# Patient Record
Sex: FEMALE | Race: WHITE | Employment: FULL TIME | URBAN - METROPOLITAN AREA
[De-identification: names, ages, dates, MRNs, and addresses within clinical notes are randomized per-mention and may not be internally consistent; named-entity substitution may affect disease eponyms.]

---

## 2017-11-07 ENCOUNTER — OFFICE VISIT (OUTPATIENT)
Dept: FAMILY MEDICINE CLINIC | Age: 57
End: 2017-11-07

## 2017-11-07 VITALS
DIASTOLIC BLOOD PRESSURE: 76 MMHG | WEIGHT: 207.2 LBS | HEART RATE: 59 BPM | BODY MASS INDEX: 30.69 KG/M2 | OXYGEN SATURATION: 95 % | TEMPERATURE: 98.2 F | SYSTOLIC BLOOD PRESSURE: 118 MMHG | HEIGHT: 69 IN | RESPIRATION RATE: 18 BRPM

## 2017-11-07 DIAGNOSIS — F32.A CHRONIC DEPRESSION: ICD-10-CM

## 2017-11-07 DIAGNOSIS — M19.049 HAND ARTHRITIS: ICD-10-CM

## 2017-11-07 DIAGNOSIS — E78.5 HYPERLIPIDEMIA, UNSPECIFIED HYPERLIPIDEMIA TYPE: ICD-10-CM

## 2017-11-07 DIAGNOSIS — Z00.00 ROUTINE GENERAL MEDICAL EXAMINATION AT A HEALTH CARE FACILITY: Primary | ICD-10-CM

## 2017-11-07 DIAGNOSIS — E55.9 VITAMIN D DEFICIENCY: ICD-10-CM

## 2017-11-07 DIAGNOSIS — Z23 ENCOUNTER FOR IMMUNIZATION: ICD-10-CM

## 2017-11-07 DIAGNOSIS — H93.13 BILATERAL TINNITUS: ICD-10-CM

## 2017-11-07 RX ORDER — FLUOXETINE HYDROCHLORIDE 20 MG/1
20 CAPSULE ORAL DAILY
Qty: 90 CAP | Refills: 3 | Status: SHIPPED | OUTPATIENT
Start: 2017-11-07 | End: 2019-03-07 | Stop reason: SDUPTHER

## 2017-11-07 RX ORDER — BUPROPION HYDROCHLORIDE 300 MG/1
300 TABLET ORAL DAILY
Qty: 90 TAB | Refills: 3 | Status: SHIPPED | OUTPATIENT
Start: 2017-11-07 | End: 2019-03-06 | Stop reason: SDUPTHER

## 2017-11-07 NOTE — PROGRESS NOTES
Chief Complaint   Patient presents with    Complete Physical     fasting      1. Have you been to the ER, urgent care clinic since your last visit? Hospitalized since your last visit? Yes [address] in [facility] for UTI    2. Have you seen or consulted any other health care providers outside of the [address] [place] [address] since your last visit? Include any pap smears or colon screening.    Yes see above

## 2017-11-07 NOTE — PATIENT INSTRUCTIONS
Well Visit, Women 48 to 72: Care Instructions  Your Care Instructions    Physical exams can help you stay healthy. Your doctor has checked your overall health and may have suggested ways to take good care of yourself. He or she also may have recommended tests. At home, you can help prevent illness with healthy eating, regular exercise, and other steps. Follow-up care is a key part of your treatment and safety. Be sure to make and go to all appointments, and call your doctor if you are having problems. It's also a good idea to know your test results and keep a list of the medicines you take. How can you care for yourself at home? · Reach and stay at a healthy weight. This will lower your risk for many problems, such as obesity, diabetes, heart disease, and high blood pressure. · Get at least 30 minutes of exercise on most days of the week. Walking is a good choice. You also may want to do other activities, such as running, swimming, cycling, or playing tennis or team sports. · Do not smoke. Smoking can make health problems worse. If you need help quitting, talk to your doctor about stop-smoking programs and medicines. These can increase your chances of quitting for good. · Protect your skin from too much sun. When you're outdoors from 10 a.m. to 4 p.m., stay in the shade or cover up with clothing and a hat with a wide brim. Wear sunglasses that block UV rays. Even when it's cloudy, put broad-spectrum sunscreen (SPF 30 or higher) on any exposed skin. · See a dentist one or two times a year for checkups and to have your teeth cleaned. · Wear a seat belt in the car. · Limit alcohol to 1 drink a day. Too much alcohol can cause health problems. Follow your doctor's advice about when to have certain tests. These tests can spot problems early. · Cholesterol.  Your doctor will tell you how often to have this done based on your age, family history, or other things that can increase your risk for heart attack and stroke. · Blood pressure. Have your blood pressure checked during a routine doctor visit. Your doctor will tell you how often to check your blood pressure based on your age, your blood pressure results, and other factors. · Mammogram. Ask your doctor how often you should have a mammogram, which is an X-ray of your breasts. A mammogram can spot breast cancer before it can be felt and when it is easiest to treat. · Pap test and pelvic exam. Ask your doctor how often you should have a Pap test. You may not need to have a Pap test as often as you used to. · Vision. Have your eyes checked every year or two or as often as your doctor suggests. Some experts recommend that you have yearly exams for glaucoma and other age-related eye problems starting at age 48. · Hearing. Tell your doctor if you notice any change in your hearing. You can have tests to find out how well you hear. · Diabetes. Ask your doctor whether you should have tests for diabetes. · Colon cancer. You should begin tests for colon cancer at age 48. You may have one of several tests. Your doctor will tell you how often to have tests based on your age and risk. Risks include whether you already had a precancerous polyp removed from your colon or whether your parents, sisters and brothers, or children have had colon cancer. · Thyroid disease. Talk to your doctor about whether to have your thyroid checked as part of a regular physical exam. Women have an increased chance of a thyroid problem. · Osteoporosis. You should begin tests for bone density at age 72. If you are younger than 72, ask your doctor whether you have factors that may increase your risk for this disease. You may want to have this test before age 72. · Heart attack and stroke risk. At least every 4 to 6 years, you should have your risk for heart attack and stroke assessed.  Your doctor uses factors such as your age, blood pressure, cholesterol, and whether you smoke or have diabetes to show what your risk for a heart attack or stroke is over the next 10 years. When should you call for help? Watch closely for changes in your health, and be sure to contact your doctor if you have any problems or symptoms that concern you. Where can you learn more? Go to http://radha-jihan.info/. Enter Y588 in the search box to learn more about \"Well Visit, Women 50 to 72: Care Instructions. \"  Current as of: May 12, 2017  Content Version: 11.4  © 3288-7366 [employer]. Care instructions adapted under license by [employer] (which disclaims liability or warranty for this information). If you have questions about a medical condition or this instruction, always ask your healthcare professional. [address] any warranty or liability for your use of this information.

## 2017-11-07 NOTE — PROGRESS NOTES
HISTORY OF PRESENT ILLNESS   HPI  Annual CPE fasting. Since her last checkup over a year ago she underwent partial knee replacement in 8-2016. She states that ever since then and starting to eat healthier, she in general has been feeling so much better. She has been having some generalized aching all over which she a/t the Lipitor so she stopped it after ~ 2-3 month trial and has been feeling much better ever since. No more joint pains or generalized aching. She has been exercising more routinely and following healthier diet, see Soc hx below. She is happy to see her wt coming down as a result of her healthier lifestyle modifications. Her mood and energy are much better as well. REVIEW OF SYMPTOMS     Review of Systems   Constitutional: Negative. HENT: Positive for tinnitus (both ears x several years, seems to be getting worse, low pitch constant buzzing noise). Negative for hearing loss. Eyes: Negative. Respiratory: Negative. Cardiovascular: Negative. Gastrointestinal: Negative. Genitourinary: Negative. Musculoskeletal:        B hand joints stiff at times, PGM had hand arthritis that became crippling and pt is concerned. Her left pinky has been stiff and achy. The left thumb tendonitis is getting better but not gone away. She is no longer taking Mobic since she got her knee replaced bc that is doing so much better. Neurological: Negative. Negative for dizziness and headaches. Psychiatric/Behavioral: Negative for depression. The patient is not nervous/anxious and does not have insomnia.          Doing really well on current regimen of Wellbutrin and Prozac and needs these renewed.            PROBLEM LIST/MEDICAL HISTORY      Problem List  Date Reviewed: 11/7/2017          Codes Class Noted    DDD (degenerative disc disease), cervical ICD-10-CM: M50.30  ICD-9-CM: 722.4  7/14/2016        Cervical spinal stenosis w/ right radiculopathy ICD-10-CM: M48.02  ICD-9-CM: 723.0  7/14/2016 Overview Signed 7/14/2016  8:43 AM by Mary Butler MD     2016 Dr Jim Joyner, PT             Screening exams for skin cancer ICD-10-CM: Z12.83  ICD-9-CM: V76.43  3/30/2016    Overview Signed 3/30/2016 11:22 AM by Mary Butler MD     Dermatologist routine checkes             Endometrial cancer [facility]) s/p Total Lap Hysterectomy-BSO 9-2015 ICD-10-CM: C54.1  ICD-9-CM: 182.0  9/10/2015    Overview Signed 10/13/2015  1:13 PM by Mary Butler MD     Grade 1 Endometrioid Carcinoma, Gyn Dr Grady Lopez, Fabiola Cooper, Dr Tiara Mendoza              Avulsion fracture of right ankle ICD-10-CM: S82.899A  ICD-9-CM: 824.8  7/10/2015    Overview Signed 7/10/2015  9:02 AM by Mary Butler MD     ~6-2015, Keyla Yuan, boot             Baker's cyst ICD-10-CM: M71.20  ICD-9-CM: 727.51  12/9/2013    Overview Signed 12/9/2013 11:58 AM by Mary Butler MD     Left knee; 12/6/13, Dr Flora Knutson             Vitamin D deficiency ICD-10-CM: E55.9  ICD-9-CM: 268.9  12/23/2012        Postmenopause, LMP 44 yo, No HRT ICD-10-CM: Z78.0  ICD-9-CM: V49.81  12/6/2012        Seasonal allergic rhinitis ICD-10-CM: J30.2  ICD-9-CM: 477.9  12/6/2012    Overview Signed 12/6/2012  2:27 PM by Mary Butler MD     Spring and Fall worst             Pneumonia ICD-10-CM: J18.9  ICD-9-CM: 389  12/6/2012    Overview Signed 12/6/2012  2:28 PM by Mary Butler MD     Spring 2012, treated outpt, [facility]             History of depression ICD-10-CM: Z86.59  ICD-9-CM: V11.8  12/6/2012    Overview Addendum 6/26/2013  2:16 PM by Mary Butler MD     Treated sporadically over the years w/ Effexor or Celexa (better)             Hyperlipidemia ICD-10-CM: E78.5  ICD-9-CM: 272.4  12/6/2012        Chronic depression ICD-10-CM: F32.9  ICD-9-CM: 479  12/6/2012        H/O Recurrent Asthmatic Bronchitis ICD-10-CM: J45.909  ICD-9-CM: 493.90  12/6/2012    Overview Signed 12/6/2012  2:38 PM by Mary Butler MD Since ~ 2006             OA (osteoarthritis) of left knee ICD-10-CM: M17.10  ICD-9-CM: 715.36  12/6/2012    Overview Addendum 3/25/2015 12:00 PM by Lavelle Espino MD     Xrays 2011; Ortho Dr Valeria Zelaya, 12/2013, xrayed again, advanced DJD/OA ; CSI 12/2013, 3/2014, 6/2014, 12/2014                       PAST SURGICAL HISTORY       Past Surgical History:   Procedure Laterality Date    HX BREAST BIOPSY  12/17/12    right breast, benign, calcifications; [facility] Babinski; Fibrosis    HX COLONOSCOPY  4-, Dr Matias Azul    Normal, follow up 10 yrs    HX KNEE REPLACEMENT Left 08/2016    Dr Pam Don    [address]  2014    [facility] removed from right upper back    HX TOTAL LAPAROSCOPIC HYSTERECTOMY W/ BS&O  9/24/2015, Dr Alyssa Spence    Total Lap Hysterectomy and BSO; Endometrial cancer; grade 1 endometrioid adenocarcinoma.; Dr. Arlin Mccall, 23 yo         MEDICATIONS      Current Outpatient Prescriptions   Medication Sig    FLUoxetine (PROZAC) 20 mg capsule Take 20 mg by mouth daily. Started per Dr. Ramonita Marroquin 8-24-15    buPROPion XL (WELLBUTRIN XL) 300 mg XL tablet Take 300 mg by mouth every morning.  cholecalciferol (VITAMIN D3) 1,000 unit tablet Take  by mouth daily.  cetirizine (ZYRTEC) 10 mg tablet Take 10 mg by mouth daily as needed for Allergies (spring and fall).  multivitamin (ONE A DAY) tablet Take 1 Tab by mouth daily.  fluticasone (FLONASE) 50 mcg/actuation nasal spray USE 2 SPRAYS IN EACH NOSTRIL AT BEDTIME (Patient taking differently: USE 2 SPRAYS IN EACH NOSTRIL AT BEDTIME AS NEEDED)    calcium 600 mg cap Take  by mouth daily.  albuterol (PROAIR HFA) 90 mcg/actuation inhaler Take 2 Puffs by inhalation every six (6) hours as needed. No current facility-administered medications for this visit.            ALLERGIES     Allergies   Allergen Reactions    Sulfa (Sulfonamide Antibiotics) Other (comments)     Causes migraines    Bactrim [Sulfamethoprim Ds] Other (comments)     headaches    Lipitor [Atorvastatin] Myalgia     Aching all over    Tramadol Itching          SOCIAL HISTORY       Social History     Social History    Marital status:      Spouse name: N/A    Number of children: 3    Years of education: N/A     Occupational History     at [employer] desk      Social History Main Topics    Smoking status: Former Smoker     Quit date: 1985    Smokeless tobacco: Never Used      Comment: used to be a light smoker x 3 yrs    Alcohol use 0.0 oz/week     0 Standard drinks or equivalent per week      Comment: rare, maybe 1-2 glasses of wine a month at most    Drug use: No    Sexual activity: Yes     Partners: Male      Comment: Postmenopause     Other Topics Concern    Caffeine Concern No     1 cup of coffee  qd    Weight Concern Yes     working on losing wt; happy her wt is donw from 222 to 207 since     Special Diet Yes     just cut back on fast foods, cut back on portion sizes, making healthier food choices, less carbs; not as strict the past few weeks but will get back on track    Exercise Yes     since 16 walks/jogs 3 x a week x 45 minutes, did 5k 10-21-17; stretching a few x a week, walks dog qd 5- 20 minutes; doing and feeling so much better anyway since her knee replacement in      Social History Narrative        IMMUNIZATIONS  Immunization History   Administered Date(s) Administered    Influenza Vaccine 2012, 2013    Influenza Vaccine (Quad) PF 2017    TDAP Vaccine 2006    Td 2016         FAMILY HISTORY     Family History   Problem Relation Age of Onset    Kidney Disease Mother      kidney transplant age 75yo ;  renal failure age 80    Diabetes Mother 36    High Cholesterol Mother     Arthritis-osteo Mother      B knee replacements in her 63's    Heart Disease Father     Diabetes Father     Dementia Father      Alzheimer's;  after fall and hip fx    Heart Surgery Father      CABG in his 63's    Allergic Rhinitis Daughter     Hypertension Brother     Hypertension Brother     Diabetes Brother      mild, controlling w/ diet    Diabetes Maternal Grandfather     Heart Attack Maternal Grandfather 62      MI          VITALS     Visit Vitals    /76 (BP 1 Location: Left arm, BP Patient Position: Sitting)    Pulse (!) 59    Temp 98.2 °F (36.8 °C) (Oral)    Resp 18    Ht 5' 9\" (1.753 m)    Wt 207 lb 3.2 oz (94 kg)    SpO2 95%    BMI 30.6 kg/m2          PHYSICAL EXAMINATION     Physical Exam   Constitutional: She is oriented to person, place, and time. No distress. HENT:   Right Ear: Tympanic membrane normal.   Left Ear: Tympanic membrane normal.   Nose: Nose normal.   Mouth/Throat: Oropharynx is clear and moist. No oropharyngeal exudate. Eyes: EOM are normal. Pupils are equal, round, and reactive to light. Neck: Neck supple. No JVD present. Carotid bruit is not present. No thyromegaly present. Cardiovascular: Normal rate, regular rhythm and normal heart sounds. No murmur heard. Pulmonary/Chest: Effort normal and breath sounds normal.   Abdominal: Soft. Bowel sounds are normal. She exhibits no distension and no mass. There is no tenderness. Musculoskeletal: She exhibits no edema or tenderness. Nodules at DIP joints left pinky and right middle finger. Mild arthritic changes. Lymphadenopathy:     She has no cervical adenopathy. Neurological: She is alert and oriented to person, place, and time. Coordination normal.   Psychiatric: Mood, affect and judgment normal.   Vitals reviewed.            ASSESSMENT & PLAN       ICD-10-CM ICD-9-CM    1. Routine general medical examination at a health care facility Z00.00 V70.0 CBC WITH AUTOMATED DIFF      LIPID PANEL      METABOLIC PANEL, COMPREHENSIVE      TSH 3RD GENERATION      VITAMIN D, 25 HYDROXY   2.  Hyperlipidemia, unspecified hyperlipidemia type E78.5 272.4 LIPID PANEL   3. Vitamin D deficiency E55.9 268.9 VITAMIN D, 25 HYDROXY   4. Chronic depression, well controlled on current medication regimen F32.9 311 FLUoxetine (PROZAC) 20 mg capsule      buPROPion XL (WELLBUTRIN XL) 300 mg XL tablet   5. Bilateral tinnitus H93.13 388.30 REFERRAL TO ENT-OTOLARYNGOLOGY   6. Hand arthritis M19.049 716.94 REFERRAL TO ORTHOPEDICS   7. Encounter for immunization Z23 V03.89 INFLUENZA VIRUS VAC QUAD,SPLIT,PRESV FREE SYRINGE IM      [place] IMMUNIZ ADMIN,1 SINGLE/COMB VAC/TOXOID     Fasting labs today  Reviewed diet, nutrition, exercise and weight control  Commended on her modifications thus far  Cardiovascular risk and specific lipid/LDL goals reviewed  Reviewed medications and side effects in detail  She stopped Lipitor several months ago due to aching and her symptoms went away. She is feeling much better in general now. She has been working on losing wt w/ better diet and regular exercise and feels great; happy her wt is down from 222 to 207 since 2016 so far. Further follow up & other recommendations pending review of labs as well  If all remains good and stable, RTC 1 yr CPE  Sees gyn q 6months due to h/o hysterectomy for endometrial cancer  Mammogram is scheduled for  at [facility]. Colonoscopy up to date.

## 2017-11-08 LAB
25(OH)D3+25(OH)D2 SERPL-MCNC: 31.3 NG/ML (ref 30–100)
ALBUMIN SERPL-MCNC: 4.5 G/DL (ref 3.5–5.5)
ALBUMIN/GLOB SERPL: 1.6 {RATIO} (ref 1.2–2.2)
ALP SERPL-CCNC: 90 IU/L (ref 39–117)
ALT SERPL-CCNC: 26 IU/L (ref 0–32)
AST SERPL-CCNC: 20 IU/L (ref 0–40)
BASOPHILS # BLD AUTO: 0 X10E3/UL (ref 0–0.2)
BASOPHILS NFR BLD AUTO: 1 %
BILIRUB SERPL-MCNC: 0.4 MG/DL (ref 0–1.2)
BUN SERPL-MCNC: 17 MG/DL (ref 6–24)
BUN/CREAT SERPL: 21 (ref 9–23)
CALCIUM SERPL-MCNC: 9.6 MG/DL (ref 8.7–10.2)
CHLORIDE SERPL-SCNC: 101 MMOL/L (ref 96–106)
CHOLEST SERPL-MCNC: 284 MG/DL (ref 100–199)
CO2 SERPL-SCNC: 25 MMOL/L (ref 18–29)
CREAT SERPL-MCNC: 0.82 MG/DL (ref 0.57–1)
EOSINOPHIL # BLD AUTO: 0.1 X10E3/UL (ref 0–0.4)
EOSINOPHIL NFR BLD AUTO: 2 %
ERYTHROCYTE [DISTWIDTH] IN BLOOD BY AUTOMATED COUNT: 13.5 % (ref 12.3–15.4)
GFR SERPLBLD CREATININE-BSD FMLA CKD-EPI: 79 ML/MIN/1.73
GFR SERPLBLD CREATININE-BSD FMLA CKD-EPI: 91 ML/MIN/1.73
GLOBULIN SER CALC-MCNC: 2.8 G/DL (ref 1.5–4.5)
GLUCOSE SERPL-MCNC: 87 MG/DL (ref 65–99)
HCT VFR BLD AUTO: 40.3 % (ref 34–46.6)
HDLC SERPL-MCNC: 49 MG/DL
HGB BLD-MCNC: 13.3 G/DL (ref 11.1–15.9)
IMM GRANULOCYTES # BLD: 0 X10E3/UL (ref 0–0.1)
IMM GRANULOCYTES NFR BLD: 0 %
INTERPRETATION, 910389: NORMAL
LDLC SERPL CALC-MCNC: 212 MG/DL (ref 0–99)
LYMPHOCYTES # BLD AUTO: 2.1 X10E3/UL (ref 0.7–3.1)
LYMPHOCYTES NFR BLD AUTO: 47 %
MCH RBC QN AUTO: 28.9 PG (ref 26.6–33)
MCHC RBC AUTO-ENTMCNC: 33 G/DL (ref 31.5–35.7)
MCV RBC AUTO: 88 FL (ref 79–97)
MONOCYTES # BLD AUTO: 0.3 X10E3/UL (ref 0.1–0.9)
MONOCYTES NFR BLD AUTO: 7 %
NEUTROPHILS # BLD AUTO: 2 X10E3/UL (ref 1.4–7)
NEUTROPHILS NFR BLD AUTO: 43 %
PLATELET # BLD AUTO: 366 X10E3/UL (ref 150–379)
POTASSIUM SERPL-SCNC: 4.7 MMOL/L (ref 3.5–5.2)
PROT SERPL-MCNC: 7.3 G/DL (ref 6–8.5)
RBC # BLD AUTO: 4.6 X10E6/UL (ref 3.77–5.28)
SODIUM SERPL-SCNC: 143 MMOL/L (ref 134–144)
TRIGL SERPL-MCNC: 114 MG/DL (ref 0–149)
TSH SERPL DL<=0.005 MIU/L-ACNC: 2.1 UIU/ML (ref 0.45–4.5)
VLDLC SERPL CALC-MCNC: 23 MG/DL (ref 5–40)
WBC # BLD AUTO: 4.6 X10E3/UL (ref 3.4–10.8)

## 2017-11-26 ENCOUNTER — TELEPHONE (OUTPATIENT)
Dept: FAMILY MEDICINE CLINIC | Age: 57
End: 2017-11-26

## 2017-11-27 NOTE — TELEPHONE ENCOUNTER
Blood counts, liver, kidney, thyroid, bs, vit D normal  LDL cholesterol very high and highest it has been last several checks. She self dc'd Lipitor months ago due to aching. She is better since being off it, but her LDL has worsened significantly.    We should definitely try her on alternate statin therapy  I recommend Crestor 10 mg daily and that she add OTC CoQ10 100-200 mg daily  In addition to working on diet/exercise  Let me know and route back  Fasting follow up 3months

## 2017-11-29 NOTE — TELEPHONE ENCOUNTER
JANICE for return call. PI of results, she is willing to try the crestor. She will call me if any problem. She was transferred to set up appt for fasting f/u 3 months.  Confirmed pharmacy and 90 day supply

## 2017-11-30 ENCOUNTER — OFFICE VISIT (OUTPATIENT)
Dept: FAMILY MEDICINE CLINIC | Age: 57
End: 2017-11-30

## 2017-11-30 VITALS
TEMPERATURE: 97.8 F | HEIGHT: 69 IN | RESPIRATION RATE: 18 BRPM | OXYGEN SATURATION: 98 % | BODY MASS INDEX: 30.81 KG/M2 | HEART RATE: 77 BPM | WEIGHT: 208 LBS | SYSTOLIC BLOOD PRESSURE: 121 MMHG | DIASTOLIC BLOOD PRESSURE: 86 MMHG

## 2017-11-30 DIAGNOSIS — R31.0 GROSS HEMATURIA: ICD-10-CM

## 2017-11-30 DIAGNOSIS — N30.90 BLADDER INFECTION: Primary | ICD-10-CM

## 2017-11-30 LAB
BACTERIA UA POCT, BACTPOCT: NORMAL
BILIRUB UR QL STRIP: NEGATIVE
CASTS UA POCT: NORMAL
CLUE CELLS, CLUEPOCT: NORMAL
CRYSTALS UA POCT, CRYSPOCT: NORMAL
EPITHELIAL CELLS POCT: NORMAL
GLUCOSE UR-MCNC: NEGATIVE MG/DL
KETONES P FAST UR STRIP-MCNC: NEGATIVE MG/DL
MUCUS UA POCT, MUCPOCT: NORMAL
PH UR STRIP: 7 [PH] (ref 4.6–8)
PROT UR QL STRIP: NORMAL
RBC UA POCT, RBCPOCT: NORMAL
SP GR UR STRIP: 1.01 (ref 1–1.03)
TRICH UA POCT, TRICHPOC: NORMAL
UA UROBILINOGEN AMB POC: NORMAL (ref 0.2–1)
URINALYSIS CLARITY POC: CLEAR
URINALYSIS COLOR POC: YELLOW
URINE BLOOD POC: NORMAL
URINE CULT COMMENT, POCT: NORMAL
URINE LEUKOCYTES POC: NORMAL
URINE NITRITES POC: NEGATIVE
WBC UA POCT, WBCPOCT: NORMAL
YEAST UA POCT, YEASTPOC: NORMAL

## 2017-11-30 RX ORDER — CIPROFLOXACIN 500 MG/1
500 TABLET ORAL 2 TIMES DAILY
Qty: 14 TAB | Refills: 0 | Status: SHIPPED | OUTPATIENT
Start: 2017-11-30 | End: 2017-12-07

## 2017-11-30 NOTE — MR AVS SNAPSHOT
Visit Information Date & Time Provider Department Dept. Phone Encounter #  
 11/30/2017 10:45 AM Andrea Callahan, [address] [phone] [id number] Follow-up Instructions Return if symptoms worsen or fail to improve and in 1-2 mo just to recheck urine. Upcoming Health Maintenance Date Due  
 BREAST CANCER SCRN MAMMOGRAM 8/16/2018 PAP AKA CERVICAL CYTOLOGY 8/27/2018 Pneumococcal 19-64 Highest Risk (2 of 3 - PCV13) 11/7/2018 COLONOSCOPY 4/14/2024 DTaP/Tdap/Td series (3 - Td) 3/3/2026 Allergies as of 11/30/2017  Review Complete On: 11/30/2017 By: Andrea Callahan MD  
  
 Severity Noted Reaction Type Reactions Sulfa (Sulfonamide Antibiotics) Medium 11/07/2017   Side Effect Other (comments) Causes migraines Bactrim [Sulfamethoprim Ds]  12/09/2010    Other (comments)  
 headaches Lipitor [Atorvastatin]  11/07/2017    Myalgia Aching all over Tramadol  07/02/2014    Itching Current Immunizations  Reviewed on 11/7/2017 Name Date Influenza Vaccine 12/9/2013 12:24 PM, 12/6/2012 Influenza Vaccine (Quad) PF 11/7/2017 TDAP Vaccine 8/23/2006 Td 3/3/2016 Not reviewed this visit You Were Diagnosed With   
  
 Codes Comments Bladder infection    -  Primary ICD-10-CM: N30.90 ICD-9-CM: 595.9 Gross hematuria     ICD-10-CM: R31.0 ICD-9-CM: 599.71 Vitals BP Pulse Temp Resp Height(growth percentile) Weight(growth percentile) 121/86 (BP 1 Location: Left arm, BP Patient Position: Sitting) 77 97.8 °F (36.6 °C) (Oral) 18 5' 9\" (1.753 m) 208 lb (94.3 kg) SpO2 BMI OB Status Smoking Status 98% 30.72 kg/m2 Hysterectomy Former Smoker Vitals History BMI and BSA Data Body Mass Index Body Surface Area 30.72 kg/m 2 2.14 m 2 Preferred Pharmacy Pharmacy Name Phone [id number] Reta Thornton, [address] [facility] [facility] [phone] Your Updated Medication List  
  
   
This list is accurate as of: 11/30/17 11:46 AM.  Always use your most recent med list.  
  
  
  
  
 buPROPion  mg XL tablet Commonly known as:  Lesly Mays Take 1 Tab by mouth daily. In the AM  Indications: Depression  
  
 calcium 600 mg Cap Take  by mouth daily. ciprofloxacin HCl 500 mg tablet Commonly known as:  CIPRO Take 1 Tab by mouth two (2) times a day for 7 days. FLUoxetine 20 mg capsule Commonly known as:  PROzac Take 1 Cap by mouth daily. Indications: Depression  
  
 fluticasone 50 mcg/actuation nasal spray Commonly known as:  FLONASE  
USE 2 SPRAYS IN EACH NOSTRIL AT BEDTIME  
  
 multivitamin tablet Commonly known as:  ONE A DAY Take 1 Tab by mouth daily. PROAIR HFA 90 mcg/actuation inhaler Generic drug:  albuterol Take 2 Puffs by inhalation every six (6) hours as needed. VITAMIN D3 1,000 unit tablet Generic drug:  cholecalciferol Take  by mouth daily. ZyrTEC 10 mg tablet Generic drug:  cetirizine Take 10 mg by mouth daily as needed for Allergies (spring and fall). Prescriptions Sent to Pharmacy Refills  
 ciprofloxacin HCl (CIPRO) 500 mg tablet 0 Sig: Take 1 Tab by mouth two (2) times a day for 7 days. Class: Normal  
 Pharmacy: [facility] [address] [place] [address] Ph #: [phone] Route: Oral  
  
We Performed the Following AMB POC URINALYSIS DIP STICK AUTO W/ MICRO  [99862 CPT(R)] CULTURE, URINE [id number] CPT(R)] Follow-up Instructions Return if symptoms worsen or fail to improve and in 1-2 mo just to recheck urine. Patient Instructions Urinary Tract Infection in Women: Care Instructions Your Care Instructions A urinary tract infection, or UTI, is a general term for an infection anywhere between the kidneys and the urethra (where urine comes out).  Most UTIs are bladder infections. They often cause pain or burning when you urinate. UTIs are caused by bacteria and can be cured with antibiotics. Be sure to complete your treatment so that the infection goes away. Follow-up care is a key part of your treatment and safety. Be sure to make and go to all appointments, and call your doctor if you are having problems. It's also a good idea to know your test results and keep a list of the medicines you take. How can you care for yourself at home? · Take your antibiotics as directed. Do not stop taking them just because you feel better. You need to take the full course of antibiotics. · Drink extra water and other fluids for the next day or two. This may help wash out the bacteria that are causing the infection. (If you have kidney, heart, or liver disease and have to limit fluids, talk with your doctor before you increase your fluid intake.) · Avoid drinks that are carbonated or have caffeine. They can irritate the bladder. · Urinate often. Try to empty your bladder each time. · To relieve pain, take a hot bath or lay a heating pad set on low over your lower belly or genital area. Never go to sleep with a heating pad in place. To prevent UTIs · Drink plenty of water each day. This helps you urinate often, which clears bacteria from your system. (If you have kidney, heart, or liver disease and have to limit fluids, talk with your doctor before you increase your fluid intake.) · Urinate when you need to. · Urinate right after you have sex. · Change sanitary pads often. · Avoid douches, bubble baths, feminine hygiene sprays, and other feminine hygiene products that have deodorants. · After going to the bathroom, wipe from front to back. When should you call for help? Call your doctor now or seek immediate medical care if: 
? · Symptoms such as fever, chills, nausea, or vomiting get worse or appear for the first time. ? · You have new pain in your back just below your rib cage. This is called flank pain. ? · There is new blood or pus in your urine. ? · You have any problems with your antibiotic medicine. ? Watch closely for changes in your health, and be sure to contact your doctor if: 
? · You are not getting better after taking an antibiotic for 2 days. ? · Your symptoms go away but then come back. Where can you learn more? Go to http://radha-jihan.info/. Enter U991 in the search box to learn more about \"Urinary Tract Infection in Women: Care Instructions. \" Current as of: May 12, 2017 Content Version: 11.4 © 1140-8956 [email or web address]. Care instructions adapted under license by [employer] (which disclaims liability or warranty for this information). If you have questions about a medical condition or this instruction, always ask your healthcare professional. [address] any warranty or liability for your use of this information. Introducing [facility] & HEALTH SERVICES! Dear Ed Canchola: Thank you for requesting a [employer] account. Our records indicate that you already have an active [employer] account. You can access your account anytime at [email or web address]. [employer] Did you know that you can access your hospital and ER discharge instructions at any time in [employer]? You can also review all of your test results from your hospital stay or ER visit. Additional Information If you have questions, please visit the Frequently Asked Questions section of the [employer] website at [email or web address] [employer]/. Remember, [employer] is NOT to be used for urgent needs. For medical emergencies, dial 911. Now available from your iPhone and Android! Please provide this summary of care documentation to your next provider. Your primary care clinician is listed as GAL QUINTEROS.  If you have any questions after today's visit, please call [phone].

## 2017-11-30 NOTE — PROGRESS NOTES
1. Have you been to the ER, urgent care clinic since your last visit? Hospitalized since your last visit? No    2. Have you seen or consulted any other health care providers outside of the [facility] since your last visit? Include any pap smears or colon screening. No     Chief Complaint   Patient presents with    Bladder Infection     frequency urination,burning,pink/red when patient wipes-no complaints of back pain     Learning assessment complete  Abuse Screening Questionnaire 11/7/2017   Do you ever feel afraid of your partner? N   Are you in a relationship with someone who physically or mentally threatens you? N   Is it safe for you to go home? Y     Fall Risk Assessment, last 12 mths 11/7/2017   Able to walk? Yes   Fall in past 12 months?  No

## 2017-11-30 NOTE — PATIENT INSTRUCTIONS

## 2017-11-30 NOTE — TELEPHONE ENCOUNTER
Patient is calling in regards to a missed call from Natalie Guevara, tried contacting nurse, no success.     Best call back # for patient: [phone]

## 2017-11-30 NOTE — PROGRESS NOTES
Assessment/Plan:     Diagnoses and all orders for this visit:    1. Bladder infection  -     AMB POC URINALYSIS DIP STICK AUTO W/ MICRO   -     CULTURE, URINE  -     ciprofloxacin HCl (CIPRO) 500 mg tablet; Take 1 Tab by mouth two (2) times a day for 7 days. 2. Gross hematuria    -  Repeat urinalysis post antibiotic. Pt obtained sample cup to return. Follow-up Disposition:  Return if symptoms worsen or fail to improve and in 1-2 mo just to recheck urine. Discussed expected course/resolution/complications of diagnosis in detail with patient.    Medication risks/benefits/costs/interactions/alternatives discussed with patient.    Pt was given after visit summary which includes diagnoses, current medications & vitals. Pt expressed understanding with the diagnosis and plan        Subjective:      Goldie Chaney is a 61 y.o. female who presents for had concerns including Bladder Infection. Urinary Tract Infection  Patient complains of dysuria, frequency, urgency, abnormal smelling urine, burning with urination. Onset was 1 day ago, gradually worsening since that time. Patient complains of none. Patient denies back pain, fever and stomach ache. There is not any concern of sexual abuse. There is not a history of trauma to the genital area. Patient does have a history of recurrent UTI. Patient does not have a history of pyelonephritis. Two recent UTI's on 10/21 treated with *** and 11/16 treated with nitrofurantoin. She practices proper hygiene after intercourse. Two years ago had uterine cancer with hysterectomy with not problems since. No radiation. Current Outpatient Prescriptions   Medication Sig Dispense Refill    ciprofloxacin HCl (CIPRO) 500 mg tablet Take 1 Tab by mouth two (2) times a day for 7 days. 14 Tab 0    FLUoxetine (PROZAC) 20 mg capsule Take 1 Cap by mouth daily.  Indications: Depression 90 Cap 3    buPROPion XL (WELLBUTRIN XL) 300 mg XL tablet Take 1 Tab by mouth daily. In the AM  Indications: Depression 90 Tab 3    cholecalciferol (VITAMIN D3) 1,000 unit tablet Take  by mouth daily.  cetirizine (ZYRTEC) 10 mg tablet Take 10 mg by mouth daily as needed for Allergies (spring and fall).  multivitamin (ONE A DAY) tablet Take 1 Tab by mouth daily.  fluticasone (FLONASE) 50 mcg/actuation nasal spray USE 2 SPRAYS IN EACH NOSTRIL AT BEDTIME (Patient taking differently: USE 2 SPRAYS IN EACH NOSTRIL AT BEDTIME AS NEEDED) 1 Bottle 11    calcium 600 mg cap Take  by mouth daily.  albuterol (PROAIR HFA) 90 mcg/actuation inhaler Take 2 Puffs by inhalation every six (6) hours as needed. Allergies   Allergen Reactions    Sulfa (Sulfonamide Antibiotics) Other (comments)     Causes migraines    Bactrim [Sulfamethoprim Ds] Other (comments)     headaches    Lipitor [Atorvastatin] Myalgia     Aching all over    Tramadol Itching       ROS:   Review of Systems   Constitutional: Negative for chills and fever. Gastrointestinal: Negative for abdominal pain, nausea and vomiting. Genitourinary: Positive for dysuria, frequency, hematuria and urgency. Negative for flank pain. Objective:     Visit Vitals    /86 (BP 1 Location: Left arm, BP Patient Position: Sitting)    Pulse 77    Temp 97.8 °F (36.6 °C) (Oral)    Resp 18    Ht 5' 9\" (1.753 m)    Wt 208 lb (94.3 kg)    SpO2 98%    BMI 30.72 kg/m2       Vitals and Nurse Documentation reviewed.      Physical Exam    Deferred to Dr. Messer Flatten exam.

## 2017-12-01 RX ORDER — ROSUVASTATIN CALCIUM 20 MG/1
20 TABLET, COATED ORAL
Qty: 90 TAB | Refills: 0 | Status: SHIPPED | OUTPATIENT
Start: 2017-12-01 | End: 2017-12-06

## 2017-12-02 LAB — BACTERIA UR CULT: NORMAL

## 2017-12-05 ENCOUNTER — PATIENT MESSAGE (OUTPATIENT)
Dept: FAMILY MEDICINE CLINIC | Age: 57
End: 2017-12-05

## 2017-12-06 RX ORDER — ATORVASTATIN CALCIUM 10 MG/1
10 TABLET, FILM COATED ORAL DAILY
Qty: 30 TAB | Refills: 3 | Status: SHIPPED | OUTPATIENT
Start: 2017-12-06 | End: 2018-05-11 | Stop reason: SDUPTHER

## 2017-12-06 NOTE — TELEPHONE ENCOUNTER
----- Message from Lorelei Ch sent at 12/5/2017  9:39 PM EST -----  Regarding: Prescription Question  Contact: [phone]  Dr. Magaly Swan,  I stopped to  my new statin Rx and it was going to cost $90...so I opted not to pick it up. If possible I'd like to try the atorvastatin that you had me on to begin with (it's much more affordable). Hopefully the side effects won't be as bad this time around.     Thanks,  Tomas

## 2018-05-12 RX ORDER — ATORVASTATIN CALCIUM 10 MG/1
TABLET, FILM COATED ORAL
Qty: 30 TAB | Refills: 0 | Status: SHIPPED | OUTPATIENT
Start: 2018-05-12 | End: 2019-05-09

## 2018-05-12 NOTE — TELEPHONE ENCOUNTER
I restarted pt on this in early December 2017 and she was to have fasting follow up evaluation on it 3months later. She did not have 3 month follow up and we reminded her of this again a few months ago and she still has nothing scheduled.  Please call and advise

## 2018-05-21 NOTE — TELEPHONE ENCOUNTER
Patient is calling in regards to a missed call from Natalie Guevara, tried contacting nurse, no success.     Best call back # for patient: [phone]

## 2018-09-12 ENCOUNTER — OFFICE VISIT (OUTPATIENT)
Dept: FAMILY MEDICINE CLINIC | Age: 58
End: 2018-09-12

## 2018-09-12 VITALS
RESPIRATION RATE: 18 BRPM | BODY MASS INDEX: 33.47 KG/M2 | OXYGEN SATURATION: 98 % | HEIGHT: 69 IN | DIASTOLIC BLOOD PRESSURE: 90 MMHG | WEIGHT: 226 LBS | HEART RATE: 64 BPM | SYSTOLIC BLOOD PRESSURE: 142 MMHG | TEMPERATURE: 98 F

## 2018-09-12 DIAGNOSIS — R06.2 WHEEZE: ICD-10-CM

## 2018-09-12 DIAGNOSIS — R05.9 COUGH: Primary | ICD-10-CM

## 2018-09-12 DIAGNOSIS — J32.9 SINOBRONCHITIS: ICD-10-CM

## 2018-09-12 DIAGNOSIS — J40 SINOBRONCHITIS: ICD-10-CM

## 2018-09-12 DIAGNOSIS — J34.89 SINUS PRESSURE: ICD-10-CM

## 2018-09-12 RX ORDER — BENZONATATE 100 MG/1
CAPSULE ORAL
Refills: 0 | COMMUNITY
Start: 2018-09-05 | End: 2019-05-09

## 2018-09-12 RX ORDER — PREDNISONE 20 MG/1
20 TABLET ORAL 2 TIMES DAILY
Qty: 10 TAB | Refills: 0 | Status: SHIPPED | OUTPATIENT
Start: 2018-09-12 | End: 2018-09-17

## 2018-09-12 RX ORDER — LEVOFLOXACIN 500 MG/1
500 TABLET, FILM COATED ORAL DAILY
Qty: 10 TAB | Refills: 0 | Status: SHIPPED | OUTPATIENT
Start: 2018-09-12 | End: 2018-09-22

## 2018-09-12 RX ORDER — ALBUTEROL SULFATE 90 UG/1
2 AEROSOL, METERED RESPIRATORY (INHALATION)
Qty: 1 INHALER | Refills: 0 | Status: SHIPPED | OUTPATIENT
Start: 2018-09-12 | End: 2020-01-03 | Stop reason: SDUPTHER

## 2018-09-12 RX ORDER — AMOXICILLIN AND CLAVULANATE POTASSIUM 875; 125 MG/1; MG/1
TABLET, FILM COATED ORAL
Refills: 0 | COMMUNITY
Start: 2018-09-05 | End: 2018-09-12 | Stop reason: ALTCHOICE

## 2018-09-12 RX ORDER — DICLOFENAC SODIUM 75 MG/1
TABLET, DELAYED RELEASE ORAL
Refills: 3 | COMMUNITY
Start: 2018-08-20 | End: 2019-10-01

## 2018-09-12 NOTE — PROGRESS NOTES
Chief Complaint Patient presents with  Cold Symptoms  
  patient took antibiotics x7 days and still has cough and congestion. 1. Have you been to the ER, urgent care clinic since your last visit? Hospitalized since your last visit? No 
 
2. Have you seen or consulted any other health care providers outside of the [address] [place] [address] since your last visit? Include any pap smears or colon screening. Yes. Patient went to urgent care 08/23, 09/5, 09/11 for cold symptoms

## 2018-09-12 NOTE — PROGRESS NOTES
HISTORY OF PRESENT ILLNESS  
HPI Started 4 weeks ago w/ scratchy throat that progressed to sore throat, sinus congestion, pressure, drainage, hacking cough, fever 101.5. Was taking Dayquil/Nyquil, Zyrtec, Angelina Pot w/o any relief. She went to [facility] on 8-23, she was diagnosed w/ viral URI and told to take Mucinex which she did. Symptoms seemed to continue to linger and the coughing fits were persistent, hacking and non productive. So she ended up going back on 9-5. She was diagnosed w/ sinus infection and prescribed Augmentin and Tessalon Perles. Those Perles made her drowsy so she only took it for a few days and went back to Mucinex, but she did complete the Augmentin yesterday but ended up going back to [facility] 2 days ago on 9-10 bc she continues to feel a lot of sinus pressure, congestion, pounding in head. She was told at that time that she also has bronchitis and that it could take a few more days. She is still not well and decided to come here for eval instead. She is having congestion in her chest and bronchial irritation. Cough is still non productive. She has remained afebrile since the first week this started about 4 weeks ago. She denies pleuritic pain or blood tinged or rust colored sputum. She gets a bit winded when taking the stairs and feels a little tight in chest/airways, slightly wheezy. She recalls a few years ago being told she might have asthma due to allergies and was prescribed Albuterol back then which worked well at the time but has not needed it since then. Currently denies runny nose, sneezing, ear pain, discolored nasal dc. Throat is feeling better. No more fevers. No medications taken today. No fever reducers taken today. REVIEW OF SYMPTOMS  
  Review of Systems Constitutional: Negative for chills and fever. Respiratory: Positive for cough and wheezing. Negative for hemoptysis and sputum production. Cardiovascular: Negative. Gastrointestinal: Negative.   
 
 
  
 PROBLEM LIST/MEDICAL HISTORY  
  
Problem List  Date Reviewed: 9/12/2018 Codes Class Noted Gross hematuria ICD-10-CM: R31.0 ICD-9-CM: 599.71  11/30/2017 DDD (degenerative disc disease), cervical ICD-10-CM: M50.30 ICD-9-CM: 722.4  7/14/2016 Cervical spinal stenosis w/ right radiculopathy ICD-10-CM: M48.02 
ICD-9-CM: 723.0  7/14/2016 Overview Signed 7/14/2016  8:43 AM by Mary Butler MD  
  2016 Dr Jim Joyner, PT Screening exams for skin cancer ICD-10-CM: Z12.83 ICD-9-CM: V76.43  3/30/2016 Overview Signed 3/30/2016 11:22 AM by Mary Butler MD  
  Dermatologist routine checkes Endometrial cancer ([facility] [address] s/p Total Lap Hysterectomy-BSO 9-2015 ICD-10-CM: C54.1 ICD-9-CM: 182.0  9/10/2015 Overview Signed 10/13/2015  1:13 PM by Mary Butler MD  
  Grade 1 Endometrioid Carcinoma, Gyn Dr Anatoly Arechiga, Dr Tiara Mendoza  
  
  
   
 Avulsion fracture of right ankle ICD-10-CM: S82.899A 
ICD-9-CM: 824.8  7/10/2015 Overview Signed 7/10/2015  9:02 AM by Mary Butler MD  
  ~6-2015, Keyla Yuan, boot Baker's cyst ICD-10-CM: M71.20 ICD-9-CM: 727.51  12/9/2013 Overview Signed 12/9/2013 11:58 AM by Mary Butler MD  
  Left knee; 12/6/13, Dr Flora Knutson Vitamin D deficiency ICD-10-CM: E55.9 ICD-9-CM: 268.9  12/23/2012 Postmenopause, LMP 46 yo, No HRT ICD-10-CM: Z78.0 ICD-9-CM: V49.81  12/6/2012 Seasonal allergic rhinitis ICD-10-CM: J30.2 ICD-9-CM: 477.9  12/6/2012 Overview Signed 12/6/2012  2:27 PM by Mary Butler MD  
  Spring and Fall worst 
  
  
   
 Pneumonia ICD-10-CM: J18.9 ICD-9-CM: 049  12/6/2012 Overview Signed 12/6/2012  2:28 PM by Mary Butler MD  
  Spring 2012, treated outpt, [facility] 
  
  
   
 History of depression ICD-10-CM: Z86.59 
ICD-9-CM: V11.8  12/6/2012 Overview Addendum 6/26/2013  2:16 PM by Hannah Beltran MD  
  Treated sporadically over the years w/ Effexor or Celexa (better) Hyperlipidemia ICD-10-CM: E78.5 ICD-9-CM: 272.4  12/6/2012 Chronic depression ICD-10-CM: F32.9 ICD-9-CM: 913  12/6/2012 H/O Recurrent Asthmatic Bronchitis ICD-10-CM: J45.909 ICD-9-CM: 493.90  12/6/2012 Overview Signed 12/6/2012  2:38 PM by Hannah Beltran MD  
  Since ~ 2006 OA (osteoarthritis) of left knee ICD-10-CM: M17.10 ICD-9-CM: 715.36  12/6/2012 Overview Addendum 3/25/2015 12:00 PM by Hannah Beltran MD  
  Xrays 2011; Ortho Dr Gardenia Tucker, 12/2013, xrayed again, advanced DJD/OA ; CSI 12/2013, 3/2014, 6/2014, 12/2014 
  
  
   
  
 
 
  PAST SURGICAL HISTORY  
   
Past Surgical History:  
Procedure Laterality Date  HX BREAST BIOPSY  12/17/12  
 right breast, benign, calcifications; Dmitriy Aarti; Fibrosis  HX COLONOSCOPY  4-, Dr Kulwinder Paige Normal, follow up 10 yrs  HX KNEE REPLACEMENT Left 08/2016 Dr Nuria Painting [facility] BIOPSY  2014 BCC removed from right upper back  HX TOTAL LAPAROSCOPIC HYSTERECTOMY W/ BS&O  9/24/2015, Dr Ian Grubbs  
 Total Lap Hysterectomy and BSO; Endometrial cancer; grade 1 endometrioid adenocarcinoma.; Dr. Nick Almonte, 25 yo MEDICATIONS  
  
Current Outpatient Prescriptions Medication Sig  
 benzonatate (TESSALON) 100 mg capsule TAKE 1 TO 2 CAPSULES BY MOUTH 3 TIMES A DAY AS NEEDED  
 diclofenac EC (VOLTAREN) 75 mg EC tablet TAKE 1 TABLET BY MOUTH TWICE A DAY  atorvastatin (LIPITOR) 10 mg tablet TAKE 1 TABLET EVERY DAY  FLUoxetine (PROZAC) 20 mg capsule Take 1 Cap by mouth daily. Indications: Depression  buPROPion XL (WELLBUTRIN XL) 300 mg XL tablet Take 1 Tab by mouth daily. In the AM  Indications: Depression  cholecalciferol (VITAMIN D3) 1,000 unit tablet Take  by mouth daily.  cetirizine (ZYRTEC) 10 mg tablet Take 10 mg by mouth daily as needed for Allergies (spring and fall).  multivitamin (ONE A DAY) tablet Take 1 Tab by mouth daily.  fluticasone (FLONASE) 50 mcg/actuation nasal spray USE 2 SPRAYS IN EACH NOSTRIL AT BEDTIME (Patient taking differently: USE 2 SPRAYS IN EACH NOSTRIL AT BEDTIME AS NEEDED)  calcium 600 mg cap Take  by mouth daily.  albuterol (PROAIR HFA) 90 mcg/actuation inhaler Take 2 Puffs by inhalation every six (6) hours as needed. No current facility-administered medications for this visit. ALLERGIES Allergies Allergen Reactions  Sulfa (Sulfonamide Antibiotics) Other (comments) Causes migraines  Bactrim [Sulfamethoprim Ds] Other (comments)  
  headaches  Lipitor [Atorvastatin] Myalgia Aching all over  Tramadol Itching SOCIAL HISTORY  
   
Social History Social History  Marital status:  Spouse name: N/A  
 Number of children: 3  
 Years of education: N/A Occupational History   at [employer], Circulation desk Social History Main Topics  Smoking status: Former Smoker Quit date: 1/1/1985  Smokeless tobacco: Never Used Comment: used to be a light smoker x 3 yrs  Alcohol use 0.0 oz/week  
  0 Standard drinks or equivalent per week Comment: rare, maybe 1-2 glasses of wine a month at most  
 Drug use: No  
 Sexual activity: Yes  
  Partners: Male Comment: Postmenopause Other Topics Concern  Caffeine Concern No  
  1 cup of coffee  qd  Weight Concern Yes  
  working on losing wt; happy her wt is donw from 222 to 207 since 2016  Special Diet Yes  
  just cut back on fast foods, cut back on portion sizes, making healthier food choices, less carbs; not as strict the past few weeks but will get back on track  Exercise Yes  
  since 9-1-17 walks/jogs 3 x a week x 45 minutes, did 5k 10-21-17; stretching a few x a week, walks dog qd 5- 20 minutes; doing and feeling so much better anyway since her knee replacement in  Social History Narrative  
 
  
IMMUNIZATIONS Immunization History Administered Date(s) Administered  Influenza Vaccine 2012, 2013  Influenza Vaccine (Quad) PF 2017  TDAP Vaccine 2006  Td 2016 FAMILY HISTORY Family History Problem Relation Age of Onset  Kidney Disease Mother   
  kidney transplant age 75yo ;  renal failure age 80  
 Diabetes Mother 36  
 High Cholesterol Mother [facility] Arthritis-osteo Mother B knee replacements in her 60's  Heart Disease Father  Diabetes Father  Dementia Father Alzheimer's;  after fall and hip fx  Heart Surgery Father CABG in his 63's  Allergic Rhinitis Daughter  Hypertension Brother  Hypertension Brother  Diabetes Brother   
  mild, controlling w/ diet  Diabetes Maternal Grandfather  Heart Attack Maternal Grandfather 62  
   MI   
 
 
 
VITALS Visit Vitals  /90 (BP 1 Location: Left arm, BP Patient Position: Sitting)  Pulse 64  Temp 98 °F (36.7 °C) (Oral)  Resp 18  Ht 5' 9\" (1.753 m)  Wt 226 lb (102.5 kg)  SpO2 98%  BMI 33.37 kg/m2 PHYSICAL EXAMINATION  
  Physical Exam  
Constitutional: No distress. HENT:  
Right Ear: Tympanic membrane normal.  
Left Ear: Tympanic membrane normal.  
Nose: Mucosal edema present. Right sinus exhibits maxillary sinus tenderness. Left sinus exhibits maxillary sinus tenderness. Mouth/Throat: Oropharynx is clear and moist. No oropharyngeal exudate. Ethmoid sinus tenderness Neck: Neck supple. Cardiovascular: Normal rate, regular rhythm and normal heart sounds. Pulmonary/Chest: Effort normal and breath sounds normal. No respiratory distress. She has no wheezes. She has no rales. Lymphadenopathy:  
  She has no cervical adenopathy. Skin: Skin is warm and dry. Vitals reviewed. 
 
 
  
 DIAGNOSTIC DATA Final result (Exam End: 9/12/2018 11:44 AM) Open Study Result Exam:  2 view chest 
  
Indication: Persistent cough, intermittent wheezing and fever. 
  
COMPARISON: No prior chest radiographs 
  
PA and lateral views demonstrate normal heart size. There is no acute process in 
the lung fields. The osseous structures are unremarkable. 
  
IMPRESSION Impression: No acute process  
 
 
  
 
 
 ASSESSMENT & PLAN  
 
  ICD-10-CM ICD-9-CM 1. Cough R05 786.2 XR CHEST PA LAT  
   albuterol (PROVENTIL HFA, VENTOLIN HFA, PROAIR HFA) 90 mcg/actuation inhaler 2. Wheeze R06.2 786.07 XR CHEST PA LAT  
   albuterol (PROVENTIL HFA, VENTOLIN HFA, PROAIR HFA) 90 mcg/actuation inhaler 3. Sinus pressure J34.89 478. 19 predniSONE (DELTASONE) 20 mg tablet 4. Sinobronchitis J32.9 473.9 levoFLOXacin (LEVAQUIN) 500 mg tablet J40 490 predniSONE (DELTASONE) 20 mg tablet  
   albuterol (PROVENTIL HFA, VENTOLIN HFA, PROAIR HFA) 90 mcg/actuation inhaler CXR negative. Confirmed by radiology. Levaquin 500 mg every day x 7 days Prednisone bid x 5 days Albuterol HFA 2 puffs q6hr prn as directed Push fluids. Cont saline sinus rinses and Mucinex Reviewed medications and side effects F/U INI and prn.  ER if any symptoms worsen or persist beyond expectant course

## 2018-09-12 NOTE — PATIENT INSTRUCTIONS
Bronchitis: Care Instructions  Your Care Instructions    Bronchitis is inflammation of the bronchial tubes, which carry air to the lungs. The tubes swell and produce mucus, or phlegm. The mucus and inflamed bronchial tubes make you cough. You may have trouble breathing. Most cases of bronchitis are caused by viruses like those that cause colds. Antibiotics usually do not help and they may be harmful. Bronchitis usually develops rapidly and lasts about 2 to 3 weeks in otherwise healthy people. Follow-up care is a key part of your treatment and safety. Be sure to make and go to all appointments, and call your doctor if you are having problems. It's also a good idea to know your test results and keep a list of the medicines you take. How can you care for yourself at home? · Take all medicines exactly as prescribed. Call your doctor if you think you are having a problem with your medicine. · Get some extra rest.  · Take an over-the-counter pain medicine, such as acetaminophen (Tylenol), ibuprofen (Advil, Motrin), or naproxen (Aleve) to reduce fever and relieve body aches. Read and follow all instructions on the label. · Do not take two or more pain medicines at the same time unless the doctor told you to. Many pain medicines have acetaminophen, which is Tylenol. Too much acetaminophen (Tylenol) can be harmful. · Take an over-the-counter cough medicine that contains dextromethorphan to help quiet a dry, hacking cough so that you can sleep. Avoid cough medicines that have more than one active ingredient. Read and follow all instructions on the label. · Breathe moist air from a humidifier, hot shower, or sink filled with hot water. The heat and moisture will thin mucus so you can cough it out. · Do not smoke. Smoking can make bronchitis worse. If you need help quitting, talk to your doctor about stop-smoking programs and medicines. These can increase your chances of quitting for good.   When should you call for help? Call 911 anytime you think you may need emergency care. For example, call if:    · You have severe trouble breathing.    Call your doctor now or seek immediate medical care if:    · You have new or worse trouble breathing.     · You cough up dark brown or bloody mucus (sputum).     · You have a new or higher fever.     · You have a new rash.    Watch closely for changes in your health, and be sure to contact your doctor if:    · You cough more deeply or more often, especially if you notice more mucus or a change in the color of your mucus.     · You are not getting better as expected. Where can you learn more? Go to http://radha-jihan.info/. Enter H333 in the search box to learn more about \"Bronchitis: Care Instructions. \"  Current as of: December 6, 2017  Content Version: 11.7  © 9816-3443 [employer]. Care instructions adapted under license by [employer] (which disclaims liability or warranty for this information). If you have questions about a medical condition or this instruction, always ask your healthcare professional. [place] [address] any warranty or liability for your use of this information. Sinusitis: Care Instructions  Your Care Instructions    Sinusitis is an infection of the lining of the sinus cavities in your head. Sinusitis often follows a cold. It causes pain and pressure in your head and face. In most cases, sinusitis gets better on its own in 1 to 2 weeks. But some mild symptoms may last for several weeks. Sometimes antibiotics are needed. Follow-up care is a key part of your treatment and safety. Be sure to make and go to all appointments, and call your doctor if you are having problems. It's also a good idea to know your test results and keep a list of the medicines you take. How can you care for yourself at home?   · Take an over-the-counter pain medicine, such as acetaminophen (Tylenol), ibuprofen (Advil, Motrin), or naproxen (Aleve). Read and follow all instructions on the label. · If the doctor prescribed antibiotics, take them as directed. Do not stop taking them just because you feel better. You need to take the full course of antibiotics. · Be careful when taking over-the-counter cold or flu medicines and Tylenol at the same time. Many of these medicines have acetaminophen, which is Tylenol. Read the labels to make sure that you are not taking more than the recommended dose. Too much acetaminophen (Tylenol) can be harmful. · Breathe warm, moist air from a steamy shower, a hot bath, or a sink filled with hot water. Avoid cold, dry air. Using a humidifier in your home may help. Follow the directions for cleaning the machine. · Use saline (saltwater) nasal washes to help keep your nasal passages open and wash out mucus and bacteria. You can buy saline nose drops at a grocery store or drugstore. Or you can make your own at home by adding 1 teaspoon of salt and 1 teaspoon of baking soda to 2 cups of distilled water. If you make your own, fill a bulb syringe with the solution, insert the tip into your nostril, and squeeze gently. Thena Number your nose. · Put a hot, wet towel or a warm gel pack on your face 3 or 4 times a day for 5 to 10 minutes each time. · Try a decongestant nasal spray like oxymetazoline (Afrin). Do not use it for more than 3 days in a row. Using it for more than 3 days can make your congestion worse. When should you call for help? Call your doctor now or seek immediate medical care if:    · You have new or worse swelling or redness in your face or around your eyes.     · You have a new or higher fever.    Watch closely for changes in your health, and be sure to contact your doctor if:    · You have new or worse facial pain.     · The mucus from your nose becomes thicker (like pus) or has new blood in it.     · You are not getting better as expected. Where can you learn more?   Go to http://radha-jihan.info/. Enter R261 in the search box to learn more about \"Sinusitis: Care Instructions. \"  Current as of: May 12, 2017  Content Version: 11.7  © 6988-3430 [employer], [employer] Care instructions adapted under license by [employer] (which disclaims liability or warranty for this information). If you have questions about a medical condition or this instruction, always ask your healthcare professional. [place] [address] any warranty or liability for your use of this information.

## 2019-03-06 DIAGNOSIS — F32.A CHRONIC DEPRESSION: ICD-10-CM

## 2019-03-07 RX ORDER — BUPROPION HYDROCHLORIDE 300 MG/1
300 TABLET ORAL DAILY
Qty: 90 TAB | Refills: 0 | Status: SHIPPED | OUTPATIENT
Start: 2019-03-07 | End: 2019-05-09

## 2019-03-07 NOTE — TELEPHONE ENCOUNTER
Patient overdue CPE and labs. Last one was . She had an acute visit 9-2018 for illness. Get scheduled for routine fasting appt and med check or CPE.

## 2019-05-09 ENCOUNTER — OFFICE VISIT (OUTPATIENT)
Dept: FAMILY MEDICINE CLINIC | Age: 59
End: 2019-05-09

## 2019-05-09 VITALS
OXYGEN SATURATION: 96 % | BODY MASS INDEX: 36.86 KG/M2 | SYSTOLIC BLOOD PRESSURE: 120 MMHG | TEMPERATURE: 97.8 F | HEART RATE: 67 BPM | HEIGHT: 68 IN | WEIGHT: 243.2 LBS | DIASTOLIC BLOOD PRESSURE: 80 MMHG | RESPIRATION RATE: 16 BRPM

## 2019-05-09 DIAGNOSIS — Z11.59 NEED FOR HEPATITIS C SCREENING TEST: ICD-10-CM

## 2019-05-09 DIAGNOSIS — Z00.00 ROUTINE GENERAL MEDICAL EXAMINATION AT A HEALTH CARE FACILITY: Primary | ICD-10-CM

## 2019-05-09 DIAGNOSIS — J30.2 PERENNIAL ALLERGIC RHINITIS WITH SEASONAL VARIATION: ICD-10-CM

## 2019-05-09 DIAGNOSIS — J45.20 MILD INTERMITTENT ASTHMA WITHOUT COMPLICATION: ICD-10-CM

## 2019-05-09 DIAGNOSIS — E78.5 HYPERLIPIDEMIA, UNSPECIFIED HYPERLIPIDEMIA TYPE: ICD-10-CM

## 2019-05-09 DIAGNOSIS — J30.89 PERENNIAL ALLERGIC RHINITIS WITH SEASONAL VARIATION: ICD-10-CM

## 2019-05-09 DIAGNOSIS — E55.9 VITAMIN D DEFICIENCY: ICD-10-CM

## 2019-05-09 NOTE — PROGRESS NOTES
Chief Complaint   Patient presents with    Complete Physical     fasting annual checkup    Cholesterol Problem     follow up     [facility]   HPI  Annual CPE fasting  Admits the past several months, not adhering to healthy diet or exercise and wt is back up. She also has been off her Lipitor x \"some months\". Was tolerating fine but admits she just \"fell off the wagon\" of taking care of herself. But she states surprisingly she otherwise has been feeling great. She went through a stressful year but the last several months things going much better. So she decided to stay off her Prozac and Wellbutrin the past 2 months instead of getting it renewed. Her mood and energy continue to be really good so far and she wants to stay off them for a while to see how she does. She states she is now trying to get back on track w/ taking better care of herself physically. REVIEW OF SYMPTOMS   Review of Systems   Constitutional: Negative. HENT: Negative. Eyes: Negative. Respiratory: Negative. Only uses her Albuterol rescue inhaler ~ 2-3 x a year, mostly for URI or an usually bad allergy flare, but that is not often   Cardiovascular: Negative. Gastrointestinal: Negative. Genitourinary: Negative. Neurological: Negative. Endo/Heme/Allergies: Allergies stable at present. Takes Zyrtec year round but only uses Flonase seasonally   Psychiatric/Behavioral: Negative. Negative for depression. The patient is not nervous/anxious and does not have insomnia.             PROBLEM LIST/MEDICAL HISTORY     Problem List  Date Reviewed: 5/9/2019          Codes Class Noted    Mild intermittent asthma without complication [id number]: V36.52  ICD-9-CM: 493.90  5/9/2019    Overview Signed 5/9/2019 12:22 PM by Eleanor Lowe MD     2-3 x a year triggered by peak allergy time or URI's             Perennial allergic rhinitis with seasonal variation ICD-10-CM: J30.89, J30.2  ICD-9-CM: 477.9  5/9/2019    Overview Signed 5/9/2019 12:27 PM by Eleanor Lowe MD     Peaks spring and fall             Gross hematuria ICD-10-CM: R31.0  ICD-9-CM: 599.71  11/30/2017        DDD (degenerative disc disease), cervical ICD-10-CM: M50.30  ICD-9-CM: 722.4  7/14/2016        Cervical spinal stenosis w/ right radiculopathy ICD-10-CM: M48.02  ICD-9-CM: 723.0  7/14/2016    Overview Signed 7/14/2016  8:43 AM by Eleanor Lowe MD     2016 Dr Nahun Ko, PT             Screening exams for skin cancer ICD-10-CM: Z12.83  ICD-9-CM: V76.43  3/30/2016    Overview Signed 3/30/2016 11:22 AM by Eleanor Lowe MD     Dermatologist routine checkes             Endometrial cancer [facility]) s/p Total Lap Hysterectomy-BSO 9-2015 ICD-10-CM: C54.1  ICD-9-CM: 182.0  9/10/2015    Overview Signed 10/13/2015  1:13 PM by Eleanor Lowe MD     [employer] [address] [employer] [place], [place], Dr La Nena Velez              Avulsion fracture of right ankle ICD-10-CM: S82.899A  ICD-9-CM: 824.8  7/10/2015    Overview Signed 7/10/2015  9:02 AM by Eleanor Lowe MD     ~6-2015, Billee Needles, boot             Baker's cyst ICD-10-CM: M71.20  ICD-9-CM: 727.51  12/9/2013    Overview Signed 12/9/2013 11:58 AM by Eleanor Lowe MD     Left knee; 12/6/13, Dr Josselyn George             Vitamin D deficiency ICD-10-CM: E55.9  ICD-9-CM: 268.9  12/23/2012        Postmenopause, LMP 46 yo, No HRT ICD-10-CM: Z78.0  ICD-9-CM: V49.81  12/6/2012        Seasonal allergic rhinitis ICD-10-CM: J30.2  ICD-9-CM: 477.9  12/6/2012    Overview Signed 12/6/2012  2:27 PM by Eleanor Lowe MD     Spring and Fall worst             Pneumonia ICD-10-CM: J18.9  ICD-9-CM: 469  12/6/2012    Overview Signed 12/6/2012  2:28 PM by Eleanor Lowe MD     Spring 2012, treated outpt, [facility]             History of depression ICD-10-CM: Z86.59  ICD-9-CM: V11.8  12/6/2012    Overview Addendum 6/26/2013  2:16 PM by Nelson Vinson MD     Treated sporadically over the years w/ Effexor or Celexa (better)             Hyperlipidemia ICD-10-CM: E78.5  ICD-9-CM: 272.4  12/6/2012        Chronic depression ICD-10-CM: F32.9  ICD-9-CM: 395  12/6/2012        H/O Recurrent Asthmatic Bronchitis ICD-10-CM: J45.909  ICD-9-CM: 493.90  12/6/2012    Overview Signed 12/6/2012  2:38 PM by Nelson Vinson MD     Since ~ 2006             OA (osteoarthritis) of left knee ICD-10-CM: M17.10  ICD-9-CM: 715.36  12/6/2012    Overview Addendum 3/25/2015 12:00 PM by Nelson Vinson MD     Xrays 2011; Ortho Dr Tyler Enriquez, 12/2013, xrayed again, advanced DJD/OA ; CSI 12/2013, 3/2014, 6/2014, 12/2014                       PAST SURGICAL HISTORY     Past Surgical History:   Procedure Laterality Date    HX BREAST BIOPSY  12/17/12    right breast, benign, calcifications; Nilson Ronquillo; Fibrosis    HX COLONOSCOPY  4-, Dr Elyse Ramsey    Normal, follow up 10 yrs    HX KNEE REPLACEMENT Left 08/2016    Dr Chao Mail    [address]    [address] removed from right upper back    HX TOTAL LAPAROSCOPIC HYSTERECTOMY W/ BS&O  9/24/2015, Dr Dalton Lopez    Total Lap Hysterectomy and BSO; Endometrial cancer; grade 1 endometrioid adenocarcinoma.; Dr. Freire Res, 23 yo         MEDICATIONS     Current Outpatient Medications   Medication Sig    diclofenac EC (VOLTAREN) 75 mg EC tablet TAKE 1 TABLET BY MOUTH TWICE A DAY    albuterol (PROVENTIL HFA, VENTOLIN HFA, PROAIR HFA) 90 mcg/actuation inhaler Take 2 Puffs by inhalation every six (6) hours as needed for Wheezing (Tightness and Coughing).  cholecalciferol (VITAMIN D3) 1,000 unit tablet Take  by mouth daily.  cetirizine (ZYRTEC) 10 mg tablet Take 10 mg by mouth daily. Indications: Non-Seasonal Allergic Runny Nose    multivitamin (ONE A DAY) tablet Take 1 Tab by mouth daily.     fluticasone (FLONASE) 50 mcg/actuation nasal spray USE 2 SPRAYS IN [facility] NOSTRIL AT BEDTIME (Patient taking differently: USE 2 SPRAYS IN EACH NOSTRIL AT BEDTIME AS NEEDED)     No current facility-administered medications for this visit. ALLERGIES     Allergies   Allergen Reactions    Sulfa (Sulfonamide Antibiotics) Other (comments)     Causes migraines    Bactrim [Sulfamethoprim Ds] Other (comments)     headaches    Lipitor [Atorvastatin] Myalgia     Aching all over    Tramadol Itching          SOCIAL HISTORY     Social History     Socioeconomic History    Marital status:      Spouse name: Not on file    Number of children: 3    Years of education: Not on file    Highest education level: Not on file   Occupational History    Occupation:  at [employer], Circulation desk   Tobacco Use    Smoking status: Former Smoker     Last attempt to quit: 1985     Years since quittin.3    Smokeless tobacco: Never Used    Tobacco comment: used to be a light smoker x 3 yrs   Substance and Sexual Activity    Alcohol use:  Yes     Alcohol/week: 0.0 oz     Comment: rare, maybe 1-2 glasses of wine a month at most    Drug use: No    Sexual activity: Yes     Partners: Male     Comment: Postmenopause   Other Topics Concern    Caffeine Concern No     Comment: 1 cup of coffee  qd    Weight Concern Yes     Comment: has regained the wt she lost w/ not eating healthy or exercising x several months; she had gotten her wt down from 222 to [phone]    Special Diet No     Comment: not eating as healthy since     Exercise No     Comment: none x several months        IMMUNIZATIONS  Immunization History   Administered Date(s) Administered    Influenza Vaccine 2012, 2013    Influenza Vaccine (Quad) PF 2017    TDAP Vaccine 2006    Td 2016         FAMILY HISTORY     Family History   Problem Relation Age of Onset    Kidney Disease Mother         kidney transplant age 77yo ;  renal failure age 80    Diabetes Mother 36    High Cholesterol Mother     Arthritis-osteo Mother         B knee replacements in her 63's    Heart Disease Father     Diabetes Father     Dementia Father         Alzheimer's;  after fall and hip fx    Heart Surgery Father         CABG in his 63's   [address] [facility] [address] No Known Problems Son     Allergic Rhinitis Daughter     No Known Problems Daughter     Hypertension Brother     Hypertension Brother     Diabetes Brother         mild, controlling w/ diet    Diabetes Maternal Grandfather     Heart Attack Maternal Grandfather 62         MI          VITALS     Visit Vitals  /80 (BP 1 Location: Right arm, BP Patient Position: Sitting)   Pulse 67   Temp 97.8 °F (36.6 °C) (Oral)   Resp 16   Ht 5' 7.91\" (1.725 m)   Wt 243 lb 3.2 oz (110.3 kg)   LMP 2011 (Within Years)   SpO2 96%   BMI 37.07 kg/m²          PHYSICAL EXAMINATION   Physical Exam   Constitutional: She is oriented to person, place, and time and well-developed, well-nourished, and in no distress. HENT:   Right Ear: Tympanic membrane normal.   Left Ear: Tympanic membrane normal.   Nose: Nose normal.   Mouth/Throat: Oropharynx is clear and moist. No oropharyngeal exudate. Eyes: Pupils are equal, round, and reactive to light. Conjunctivae and EOM are normal.   Neck: Neck supple. No JVD present. Carotid bruit is not present. No thyromegaly present. Cardiovascular: Normal rate, regular rhythm and normal heart sounds. No murmur heard. Pulmonary/Chest: Effort normal and breath sounds normal. No respiratory distress. She has no wheezes. Abdominal: Soft. Bowel sounds are normal. She exhibits no distension. There is no tenderness. Musculoskeletal: She exhibits no edema or tenderness. Lymphadenopathy:     She has no cervical adenopathy. Neurological: She is alert and oriented to person, place, and time. Psychiatric: Mood and affect normal.   Vitals reviewed.                   LABORATORY DATA         Lab Results   Component Value Date/Time Glucose 87 11/07/2017 12:19 PM    LDL, calculated 212 (H) 11/07/2017 12:19 PM    Creatinine 0.82 11/07/2017 12:19 PM      Lab Results   Component Value Date/Time    Cholesterol, total 284 (H) 11/07/2017 12:19 PM    HDL Cholesterol 49 11/07/2017 12:19 PM    LDL, calculated 212 (H) 11/07/2017 12:19 PM    Triglyceride 114 11/07/2017 12:19 PM          ASSESSMENT & PLAN       ICD-10-CM ICD-9-CM    1. Routine general medical examination at a health care facility Z00.00 V70.0 LIPID PANEL      METABOLIC PANEL, COMPREHENSIVE      CBC W/O DIFF      TSH 3RD GENERATION      HEMOGLOBIN A1C WITH EAG      VITAMIN D, 25 HYDROXY      HEPATITIS C AB   2. Hyperlipidemia, unspecified hyperlipidemia type E78.5 272.4 LIPID PANEL   3. Vitamin D deficiency E55.9 268.9 VITAMIN D, 25 HYDROXY   4. Mild intermittent asthma without complication C15.88 961.68 Stable, controlled   5. Perennial allergic rhinitis with seasonal variation J30.89 477.9 Stable on regimen of Zyrtec, Flonase prn    J30.2     6. Need for hepatitis C screening test Z11.59 V73.89 HEPATITIS C AB     Fasting labs today  Cardiovascular risk and specific lipid/LDL goals reviewed  Admits the past several months, not adhering to healthy diet or exercise and wt is back up. She also has been off her Lipitor x \"some months\". Was tolerating fine but admits she just \"fell off the wagon\" of taking care of herself. She is amenable to getting back on it and in fact is more inclined to be back on it than not bc she is aware of associated risks and states \"it really scares her\". But she will wait for labs and we will decide which statin and dose after that time. Reviewed medications and side effects in detail  Reviewed diet, nutrition, exercise, weight management, BMI/goals, age/risk based screening recommendations, health maintenance & prevention counseling. Cancer screening USPTFS guidelines reviewed w/ pt today.  Discussed benefits/positive/negative outcomes of screening based on age/risk stratification. Informed consent for/against screening based on pt's personal hx/risk factors. Updated in history above and health maintenance. Saw her Gyn-Onc Dr Jay Wolff last week and states exam was normal  Had mammogram screen yesterday at [facility]  Colonoscopy up to date  Further follow up & other recommendations pending review of labs.

## 2019-05-09 NOTE — PATIENT INSTRUCTIONS

## 2019-05-09 NOTE — PROGRESS NOTES
Chief Complaint Patient presents with  Complete Physical  
  annual  
 
1. Have you been to the ER, urgent care clinic since your last visit? Hospitalized since your last visit? No 
 
2. Have you seen or consulted any other health care providers outside of the [address] [place] [address] since your last visit? Include any pap smears or colon screening. No 
  
most recent PAP and mammogram requested

## 2019-05-10 LAB
25(OH)D3+25(OH)D2 SERPL-MCNC: 17.7 NG/ML (ref 30–100)
ALBUMIN SERPL-MCNC: 4.9 G/DL (ref 3.5–5.5)
ALBUMIN/GLOB SERPL: 2.5 {RATIO} (ref 1.2–2.2)
ALP SERPL-CCNC: 73 IU/L (ref 39–117)
ALT SERPL-CCNC: 47 IU/L (ref 0–32)
AST SERPL-CCNC: 33 IU/L (ref 0–40)
BILIRUB SERPL-MCNC: 0.3 MG/DL (ref 0–1.2)
BUN SERPL-MCNC: 19 MG/DL (ref 6–24)
BUN/CREAT SERPL: 24 (ref 9–23)
CALCIUM SERPL-MCNC: 9.8 MG/DL (ref 8.7–10.2)
CHLORIDE SERPL-SCNC: 101 MMOL/L (ref 96–106)
CHOLEST SERPL-MCNC: 234 MG/DL (ref 100–199)
CO2 SERPL-SCNC: 25 MMOL/L (ref 20–29)
CREAT SERPL-MCNC: 0.79 MG/DL (ref 0.57–1)
ERYTHROCYTE [DISTWIDTH] IN BLOOD BY AUTOMATED COUNT: 13.7 % (ref 12.3–15.4)
EST. AVERAGE GLUCOSE BLD GHB EST-MCNC: 120 MG/DL
GLOBULIN SER CALC-MCNC: 2 G/DL (ref 1.5–4.5)
GLUCOSE SERPL-MCNC: 83 MG/DL (ref 65–99)
HBA1C MFR BLD: 5.8 % (ref 4.8–5.6)
HCT VFR BLD AUTO: 40.1 % (ref 34–46.6)
HCV AB S/CO SERPL IA: <0.1 S/CO RATIO (ref 0–0.9)
HDLC SERPL-MCNC: 41 MG/DL
HGB BLD-MCNC: 13.1 G/DL (ref 11.1–15.9)
INTERPRETATION, 910389: NORMAL
LDLC SERPL CALC-MCNC: 156 MG/DL (ref 0–99)
MCH RBC QN AUTO: 29 PG (ref 26.6–33)
MCHC RBC AUTO-ENTMCNC: 32.7 G/DL (ref 31.5–35.7)
MCV RBC AUTO: 89 FL (ref 79–97)
PLATELET # BLD AUTO: 326 X10E3/UL (ref 150–379)
POTASSIUM SERPL-SCNC: 4.6 MMOL/L (ref 3.5–5.2)
PROT SERPL-MCNC: 6.9 G/DL (ref 6–8.5)
RBC # BLD AUTO: 4.52 X10E6/UL (ref 3.77–5.28)
SODIUM SERPL-SCNC: 140 MMOL/L (ref 134–144)
TRIGL SERPL-MCNC: 186 MG/DL (ref 0–149)
TSH SERPL DL<=0.005 MIU/L-ACNC: 2.07 UIU/ML (ref 0.45–4.5)
VLDLC SERPL CALC-MCNC: 37 MG/DL (ref 5–40)
WBC # BLD AUTO: 5.7 X10E3/UL (ref 3.4–10.8)

## 2019-05-27 ENCOUNTER — TELEPHONE (OUTPATIENT)
Dept: FAMILY MEDICINE CLINIC | Age: 59
End: 2019-05-27

## 2019-05-27 DIAGNOSIS — R74.8 ELEVATED LIVER ENZYMES: Primary | ICD-10-CM

## 2019-05-28 NOTE — TELEPHONE ENCOUNTER
One time Hep C screen for baby boomers negative, not infected. Blood counts, lytes, kidney, thyroid ok. Vit D low. Med list states vit D 1000 units once a day. Is she taking regularly? If not, do so. If taking routinely already, increase to 2000 units a day. Liver enzyme mildly elevated which can be related to various different causes, the most common of which are medication-induced, etoh, fatty liver, or viral.   Taking any Tylenol or Nsaids routinely? I see Voltaren (Diclofenac) on her med list. Is she taking and if so, how much/how often? Limit etoh to no more than 1 4 oz serving per day, avoid nsaids, work on low carb diet, exercise and wt loss. These can all help reverse. Repeat liver tests in 4 weeks, order pended. Fasting BS normal but HgbA1c mild prediabetes range. Some tips below. LDL has improved from 212 to 156 which is nice improvement but still quite a ways from goal of <130 and ideal <100. HDL sill low at 41 and down from 49 last check, goal >50. She stopped taking her Lipitor but told me she was amenable to going back on it if cholesterol still high. Let me know if needs refill and where. Fasting follow up 3 months later, set now. Diabetes/Pre-Diabetes Meal Planning and Exercise:  ~Avoid sweets and sugars. ~Limit carbohydrate intake to between 30-45 grams of carbs max per meal and no more than 15 grams of carbs per snack  ~Do not skip meals. ~Eat light snacks between meals that are low in fat and high in protein. ~Divide your plate by types of foods. Put non-starchy vegetables on half the plate, meat or other protein food on one-quarter of the plate, and a grain or starchy vegetable in the final quarter of the plate. Keep starches dark in color trying to void white starches in general.  ~Limit alcohol to no more than 1 standard drink per day for women and 2 for men (ie/ 12 oz beer, 5 oz wine, 1.5 oz liquor).    ~Get regular moderate intensity cardio/aerobic exercise at least 150 minutes per week ie/ 30-50 minutes 3-4 x a week. ~Reference the ADA (American Diabetes Association Diet) for additional nutrition tips. ~We can offer referral to a certified diabetes educator or our nutrition services programs if needed.

## 2019-05-29 NOTE — TELEPHONE ENCOUNTER
Future Appointments:         Future Appointments   Date Time Provider Valdemar Erica   8/26/2019  8:00 AM [facility]

## 2019-05-29 NOTE — TELEPHONE ENCOUNTER
Incoming call from patient.  verified. Informed patient of all results and recommendations. She understands. Will come back in 4 weeks for liver testing and 3 month follow up schedule for  at 8am.    patient would like a refill of lipitor.  rx pended    Sent patient diabetes recommendations via [place]

## 2019-05-29 NOTE — TELEPHONE ENCOUNTER
Ok. About the Lipitor just for clarification, is she sure she thinks she tolerated it ok the 2nd time around? Because the chart indicates that she had aching all over when she took it the first go round? I am not sure if she is just forgetting that and this may be the reason she stopped it AGAIN or does she really think she did ok w/ it the second time? Let her know, we can always go w/ trial of Crestor instead which she may tolerate better.

## 2019-05-30 NOTE — TELEPHONE ENCOUNTER
I checked with pt about the lipitor. She said that she didn't have a problem with the lipitor the second time around. She just ran out of the medication and didn't get it refilled. Ijeoma Conroy was just being a bad patient\". She also reports that after the phone call yesterday she looked at her bottles more closely and she is taking 5000 u of Vit D3 and a multi vit that has 1000u in it as well. She says that she is taking both of them everyday.

## 2019-05-31 RX ORDER — CHOLECALCIFEROL TAB 125 MCG (5000 UNIT) 125 MCG
5000 TAB ORAL DAILY
COMMUNITY
Start: 2019-05-31 | End: 2022-01-26 | Stop reason: ALTCHOICE

## 2019-05-31 RX ORDER — ATORVASTATIN CALCIUM 10 MG/1
TABLET, FILM COATED ORAL
Qty: 30 TAB | Refills: 2 | Status: SHIPPED | OUTPATIENT
Start: 2019-05-31 | End: 2019-08-27 | Stop reason: SDUPTHER

## 2019-05-31 RX ORDER — BISMUTH SUBSALICYLATE 262 MG
1 TABLET,CHEWABLE ORAL DAILY
COMMUNITY
Start: 2019-05-31

## 2019-06-02 ENCOUNTER — HOSPITAL ENCOUNTER (EMERGENCY)
Age: 59
Discharge: HOME | End: 2019-06-02
Attending: EMERGENCY MEDICINE
Payer: COMMERCIAL

## 2019-06-02 ENCOUNTER — APPOINTMENT (OUTPATIENT)
Dept: GENERAL RADIOLOGY | Age: 59
End: 2019-06-02
Attending: PHYSICIAN ASSISTANT
Payer: COMMERCIAL

## 2019-06-02 VITALS
HEART RATE: 81 BPM | OXYGEN SATURATION: 99 % | SYSTOLIC BLOOD PRESSURE: 155 MMHG | DIASTOLIC BLOOD PRESSURE: 99 MMHG | TEMPERATURE: 98 F | RESPIRATION RATE: 17 BRPM

## 2019-06-02 DIAGNOSIS — S82.891A CLOSED FRACTURE OF RIGHT ANKLE, INITIAL ENCOUNTER: Primary | ICD-10-CM

## 2019-06-02 PROCEDURE — L4360 PNEUMAT WALKING BOOT PRE CST: HCPCS

## 2019-06-02 PROCEDURE — 73610 X-RAY EXAM OF ANKLE: CPT

## 2019-06-02 PROCEDURE — 99283 EMERGENCY DEPT VISIT LOW MDM: CPT

## 2019-06-02 RX ORDER — HYDROCODONE BITARTRATE AND ACETAMINOPHEN 5; 325 MG/1; MG/1
1 TABLET ORAL
Qty: 18 TAB | Refills: 0 | Status: SHIPPED | OUTPATIENT
Start: 2019-06-02 | End: 2019-06-05

## 2019-06-02 NOTE — ED PROVIDER NOTES
60 y.o. female with past medical history significant for avulsion fx of right ankle (2015) presents with complaints of left ankle pain after mechanical glf earlier today. The pt states that she was walking outside on her deck \"and one of the boards on the deck was loose. It caused me to miss my step and I twisted my ankle. \"  There are no other acute medical complaints at this time.     PCP: MD Sho Tejada PA-C           Past Medical History:   Diagnosis Date    Avulsion fracture of right ankle 7/10/2015    ~6-2015, [facility], boot    Baker's cyst 12/9/2013    Left knee; 12/6/13, Dr Alo Briseno Cervical spinal stenosis w/ right radiculopathy 7/14/2016 2016 Dr Meagan Phoenix, PT    Chronic depression 12/6/2012    DDD (degenerative disc disease), cervical 7/14/2016    Endometrial cancer ([facility] [address] s/p Total Lap Hysterectomy-BSO 9-2015 9/10/2015    Grade 1 Endometrioid Carcinoma, Gyn Dr Ernestina Hannon, Dr Makenna Cox H/O Recurrent Asthmatic Bronchitis 12/6/2012    History of depression 12/6/2012    Hyperlipidemia 12/6/2012    Mild intermittent asthma without complication 6/3/0571    2-3 x a year triggered by peak allergy time or URI's    OA (osteoarthritis) of left knee 12/6/2012    Perennial allergic rhinitis with seasonal variation 5/9/2019    Peaks spring and fall    Pneumonia 12/6/2012    Postmenopause, LMP 46 yo, No HRT 12/6/2012    Screening exams for skin cancer 3/30/2016    Dermatologist routine checkes    UTI (urinary tract infection) 10/2017    Vitamin D deficiency 12/23/2012       Past Surgical History:   Procedure Laterality Date    HX BREAST BIOPSY  12/17/12    right breast, benign, calcifications; [facility]; Fibrosis    HX COLONOSCOPY  4-, Dr Alli Becker    Normal, follow up 10 yrs    HX KNEE REPLACEMENT Left 08/2016    Dr Christy Ferrer    [address]  2014    [facility] removed from right upper back    HX TOTAL LAPAROSCOPIC HYSTERECTOMY W/ BS&O  9/24/2015, Dr Dell Baez    Total Lap Hysterectomy and BSO; Endometrial cancer; grade 1 endometrioid adenocarcinoma.; Dr. Betts Rafael, 23 yo         Family History:   Problem Relation Age of Onset    Kidney Disease Mother         kidney transplant age 77yo ;  renal failure age 80    Diabetes Mother 36    High Cholesterol Mother     Arthritis-osteo Mother         B knee replacements in her 63's    Heart Disease Father     Diabetes Father     Dementia Father         Alzheimer's;  after fall and hip fx    Heart Surgery Father         CABG in his 63's   [address] No Known Problems Son     Allergic Rhinitis Daughter     No Known Problems Daughter     Hypertension Brother     Hypertension Brother     Diabetes Brother         mild, controlling w/ diet    Diabetes Maternal Grandfather     Heart Attack Maternal Grandfather 62         MI        Social History     Socioeconomic History    Marital status:      Spouse name: Not on file    Number of children: 3    Years of education: Not on file    Highest education level: Not on file   Occupational History    Occupation:  at [employer]   Social Needs    Financial resource strain: Not on file    Food insecurity:     Worry: Not on file     Inability: Not on file    Transportation needs:     Medical: Not on file     Non-medical: Not on file   Tobacco Use    Smoking status: Former Smoker     Last attempt to quit: 1985     Years since quittin.4    Smokeless tobacco: Never Used    Tobacco comment: used to be a light smoker x 3 yrs   Substance and Sexual Activity    Alcohol use:  Yes     Alcohol/week: 0.0 oz     Comment: rare, maybe 1-2 glasses of wine a month at most    Drug use: No    Sexual activity: Yes     Partners: Male     Comment: Postmenopause   Lifestyle    Physical activity:     Days per week: Not on file     Minutes per session: Not on file    Stress: Not on file Relationships    Social connections:     Talks on phone: Not on file     Gets together: Not on file     Attends Jainism service: Not on file     Active member of club or organization: Not on file     Attends meetings of clubs or organizations: Not on file     Relationship status: Not on file    Intimate partner violence:     Fear of current or ex partner: Not on file     Emotionally abused: Not on file     Physically abused: Not on file     Forced sexual activity: Not on file   Other Topics Concern     Service Not Asked    Blood Transfusions Not Asked    Caffeine Concern No     Comment: 1 cup of coffee  qd    Occupational Exposure Not Asked    Hobby Hazards Not Asked    Sleep Concern Not Asked    Stress Concern Not Asked    Weight Concern Yes     Comment: has regained the wt she lost w/ not eating healthy or exercising x several months; she had gotten her wt down from 222 to [phone]    Special Diet No     Comment: not eating as healthy since 7-2018    Back Care Not Asked    Exercise No     Comment: none x several months    Bike Helmet Not Asked    Seat Belt Not Asked    Self-Exams Not Asked   Social History Narrative    Not on file         ALLERGIES: Sulfa (sulfonamide antibiotics); Bactrim [sulfamethoprim ds]; and Tramadol    Review of Systems   Constitutional: Negative for chills, diaphoresis and fever. HENT: Negative for congestion, postnasal drip, rhinorrhea and sore throat. Eyes: Negative for photophobia, discharge, redness and visual disturbance. Respiratory: Negative for cough, chest tightness, shortness of breath and wheezing. Cardiovascular: Negative for chest pain, palpitations and leg swelling. Gastrointestinal: Negative for abdominal distention, abdominal pain, blood in stool, constipation, diarrhea, nausea and vomiting. Genitourinary: Negative for difficulty urinating, dysuria, frequency, hematuria and urgency.    Musculoskeletal: Negative for arthralgias, back pain, joint swelling and myalgias. Skin: Negative for color change and rash. Neurological: Negative for dizziness, speech difficulty, weakness, light-headedness, numbness and headaches. Psychiatric/Behavioral: Negative for confusion. The patient is not nervous/anxious. All other systems reviewed and are negative. Vitals:    06/02/19 1635 06/02/19 1642   BP:  (!) 155/99   Pulse: 95 81   Resp:  17   Temp:  98 °F (36.7 °C)   SpO2: 95% 99%            Physical Exam   Constitutional: She is oriented to person, place, and time. She appears well-developed and well-nourished. No distress. HENT:   Head: Normocephalic and atraumatic. Right Ear: External ear normal.   Left Ear: External ear normal.   Nose: Nose normal.   Mouth/Throat: Oropharynx is clear and moist.   Eyes: Pupils are equal, round, and reactive to light. Conjunctivae and EOM are normal. No scleral icterus. Neck: Normal range of motion. Neck supple. No JVD present. No tracheal deviation present. No thyromegaly present. Cardiovascular: Normal rate, regular rhythm and normal heart sounds. Exam reveals no gallop and no friction rub. No murmur heard. Pulmonary/Chest: Effort normal and breath sounds normal. No respiratory distress. She has no wheezes. She has no rales. She exhibits no tenderness. Musculoskeletal: She exhibits edema and tenderness. DROM at the left ankle joint.  + TTP over the left lateral malleolus. Pt is NVI. Lymphadenopathy:     She has no cervical adenopathy. Neurological: She is alert and oriented to person, place, and time. She has normal strength. She displays no atrophy and no tremor. No cranial nerve deficit. She exhibits normal muscle tone. Coordination and gait normal.   Skin: Skin is warm and dry. No rash noted. She is not diaphoretic. No erythema. Psychiatric: She has a normal mood and affect. Her behavior is normal. Judgment and thought content normal.   Nursing note and vitals reviewed. MDM  Number of Diagnoses or Management Options  Closed fracture of right ankle, initial encounter:   Diagnosis management comments: Imaging of the left ankle revealed lateral malleolar fracture of the left distal fibula. Placed patient in post op boot and provided crutches and weight bearing instructions. Pt advised to follow up with ortho for further evaluation of symptoms. Pt also given strict return precautions.   Venancio Duque PA-C         Procedures

## 2019-06-02 NOTE — ED NOTES
1732 PA reviewed discharge instructions with the patient. The patient verbalized understanding. Patient ambulated out of ED with  and walking boot. Patient had crutches. No complaints or concerns noted.

## 2019-06-02 NOTE — ED TRIAGE NOTES
Arrived from home with  in w/c and ice to left ankle. Twisted ankle while stepping off corner of board. Patient took 2 tylenol and diclofenac one hour ago.

## 2019-06-02 NOTE — DISCHARGE INSTRUCTIONS
Patient Education        Broken Ankle: Care Instructions  Your Care Instructions    An ankle may break (fracture) during sports, a fall, or other accidents. Fractures can range from a small, hairline crack, to a bone or bones broken into two or more pieces. Your treatment depends on how bad the break is. Your doctor may have put your ankle in a splint or cast to allow it to heal or to keep it stable until you see another doctor. It may take weeks or months for your ankle to heal. You can help your ankle heal with some care at home. You heal best when you take good care of yourself. Eat a variety of healthy foods, and don't smoke. You may have had a sedative to help you relax. You may be unsteady after having sedation. It can take a few hours for the medicine's effects to wear off. Common side effects of sedation include nausea, vomiting, and feeling sleepy or tired. The doctor has checked you carefully, but problems can develop later. If you notice any problems or new symptoms,  get medical treatment right away. Follow-up care is a key part of your treatment and safety. Be sure to make and go to all appointments, and call your doctor if you are having problems. It's also a good idea to know your test results and keep a list of the medicines you take. How can you care for yourself at home? · If the doctor gave you a sedative:  ? For 24 hours, don't do anything that requires attention to detail. It takes time for the medicine's effects to completely wear off.  ? For your safety, do not drive or operate any machinery that could be dangerous. Wait until the medicine wears off and you can think clearly and react easily. · Put ice or a cold pack on your ankle for 10 to 20 minutes at a time. Try to do this every 1 to 2 hours for the next 3 days (when you are awake). Put a thin cloth between the ice and your cast or splint. Keep your cast or splint dry. · Follow the cast care instructions your doctor gives you.  If you have a splint, do not take it off unless your doctor tells you to. · Be safe with medicines. Take pain medicines exactly as directed. ? If the doctor gave you a prescription medicine for pain, take it as prescribed. ? If you are not taking a prescription pain medicine, ask your doctor if you can take an over-the-counter medicine. · Prop up your leg on pillows in the first few days after the injury. Keep the ankle higher than the level of your heart. This will help reduce swelling. · Do not put weight on your ankle unless your doctor tells you to. Use crutches to walk. · Follow instructions for exercises to keep your leg strong. · Wiggle your toes often to reduce swelling and stiffness. When should you call for help? Call 911 anytime you think you may need emergency care. For example, call if:    · You have chest pain, are short of breath, or you cough up blood.     · You are very sleepy and you have trouble waking up.    Call your doctor now or seek immediate medical care if:    · You have new or worse nausea or vomiting.     · You have new or worse pain.     · Your foot is cool or pale or changes color.     · You have tingling, weakness, or numbness in your toes.     · Your cast or splint feels too tight.     · You have signs of a blood clot in your leg (called a deep vein thrombosis), such as:  ? Pain in your calf, back of the knee, thigh, or groin. ? Redness or swelling in your leg.    Watch closely for changes in your health, and be sure to contact your doctor if:    · You have a problem with your splint or cast.     · You do not get better as expected. Where can you learn more? Go to http://radha-jihan.info/. Enter P763 in the search box to learn more about \"Broken Ankle: Care Instructions. \"  Current as of: September 20, 2018  Content Version: 11.9  © 6041-4984 [employer].  Care instructions adapted under license by [employer] (which disclaims liability or warranty for this information). If you have questions about a medical condition or this instruction, always ask your healthcare professional. [place] [address] any warranty or liability for your use of this information. We hope that we have addressed all of your medical concerns. The examination and treatment you received in the Emergency Department were for an emergent problem and were not intended as complete care. It is important that you follow up with your healthcare provider(s) for ongoing care. If your symptoms worsen or do not improve as expected, and you are unable to reach your usual health care provider(s), you should return to the Emergency Department. Today's healthcare is undergoing tremendous change, and patient satisfaction surveys are one of the many tools to assess the quality of medical care. You may receive a survey from the [employer] regarding your experience in the Emergency Department. I hope that your experience has been completely positive, particularly the medical care that I provided. As such, please participate in the survey; anything less than excellent does not meet my expectations or intentions. [facility] and [facility] participate in nationally recognized quality of care measures. If your blood pressure is greater than 120/80, as reported below, we urge that you seek medical care to address the potential of high blood pressure, commonly known as hypertension. Hypertension can be hereditary or can be caused by certain medical conditions, pain, stress, or \"white coat syndrome. \"       Please make an appointment with your health care provider(s) for follow up of your Emergency Department visit.        VITALS:   Patient Vitals for the past 8 hrs:   Temp Pulse Resp BP SpO2   06/02/19 1642 98 °F (36.7 °C) 81 17 (!) 155/99 99 %   06/02/19 1635 -- 95 -- -- 95 %          Thank you for allowing us to provide you with medical care today. We realize that you have many choices for your emergency care needs. Please choose us in the future for any continued health care needs. James Small, [address]: [phone]            No results found for this or any previous visit (from the past 24 hour(s)). Xr Ankle Lt Min 3 V    Result Date: 6/2/2019  EXAM: XR ANKLE LT MIN 3 V INDICATION: left ankle pain. COMPARISON: None. FINDINGS: Three views of the left ankle demonstrate a transverse nondisplaced lateral malleolus fracture of the tip of the distal fibula. There is severe ankle DJD. The ankle mortise is widened laterally. There is diffuse soft tissue swelling. Plantar spur is noted. There is also mid foot DJD. IMPRESSION: Acute nondisplaced lateral malleolar fracture of the distal fibula. Background of severe DJD. Soft tissue swelling.

## 2019-06-28 DIAGNOSIS — R74.8 ELEVATED LIVER ENZYMES: ICD-10-CM

## 2019-06-29 ENCOUNTER — TELEPHONE (OUTPATIENT)
Dept: FAMILY MEDICINE CLINIC | Age: 59
End: 2019-06-29

## 2019-06-29 LAB
ALBUMIN SERPL-MCNC: 4.4 G/DL (ref 3.5–5.5)
ALP SERPL-CCNC: 74 IU/L (ref 39–117)
ALT SERPL-CCNC: 29 IU/L (ref 0–32)
AST SERPL-CCNC: 22 IU/L (ref 0–40)
BILIRUB DIRECT SERPL-MCNC: 0.13 MG/DL (ref 0–0.4)
BILIRUB SERPL-MCNC: 0.4 MG/DL (ref 0–1.2)
PROT SERPL-MCNC: 6.8 G/DL (ref 6–8.5)

## 2019-06-30 NOTE — PROGRESS NOTES
Future Appointments:         Future Appointments   Date Time Provider Valdemar Erica   8/26/2019  8:00 AM [facility]

## 2019-06-30 NOTE — TELEPHONE ENCOUNTER
Follow up liver tests normal.  No additional recs at this time. Will see her at appt she has set for 8-26.

## 2019-07-01 NOTE — TELEPHONE ENCOUNTER
I sent her a [employer] message with the results. Just wanted you to see this as well.   DAMASO

## 2019-08-26 ENCOUNTER — OFFICE VISIT (OUTPATIENT)
Dept: FAMILY MEDICINE CLINIC | Age: 59
End: 2019-08-26

## 2019-08-26 VITALS
RESPIRATION RATE: 16 BRPM | SYSTOLIC BLOOD PRESSURE: 112 MMHG | TEMPERATURE: 98.2 F | WEIGHT: 241.8 LBS | HEART RATE: 78 BPM | HEIGHT: 68 IN | BODY MASS INDEX: 36.65 KG/M2 | OXYGEN SATURATION: 98 % | DIASTOLIC BLOOD PRESSURE: 74 MMHG

## 2019-08-26 DIAGNOSIS — R74.01 ELEVATED ALT MEASUREMENT: ICD-10-CM

## 2019-08-26 DIAGNOSIS — E78.2 MIXED HYPERLIPIDEMIA: Primary | ICD-10-CM

## 2019-08-26 DIAGNOSIS — E55.9 VITAMIN D DEFICIENCY: ICD-10-CM

## 2019-08-26 PROBLEM — S82.892A CLOSED FRACTURE OF LEFT ANKLE: Status: ACTIVE | Noted: 2019-08-26

## 2019-08-26 NOTE — PROGRESS NOTES
Chief Complaint   Patient presents with    Cholesterol Problem     fasting     HISTORY OF PRESENT ILLNESS   HPI  Fasting follow up hyperlipidemia, labs and medication check. After her last visit in May, she has since resumed Lipitor daily, tolerating w/o reaction or side effects. Also on 5,000 units vitamin D daily now and here for recheck of that as well. Nothing special w/ diet or exercise right now, but plans to do 60 day trial of [employer] Prep Program and needs form signed. Will be meeting w/ dietician and doing stationary bike. REVIEW OF SYMPTOMS   Review of Systems   Constitutional: Negative. Respiratory: Negative. Negative for cough and shortness of breath. Cardiovascular: Negative for chest pain and palpitations. Gastrointestinal: Negative. Musculoskeletal: Negative for myalgias.            PROBLEM LIST/MEDICAL HISTORY     Problem List  Date Reviewed: 8/26/2019          Codes Class Noted    Closed fracture of left ankle ICD-10-CM: J18.333F  ICD-9-CM: 824.8  8/26/2019    Overview Addendum 8/26/2019  8:35 AM by Bo Nieto MD     [facility] boot, no surgery             Mixed hyperlipidemia ICD-10-CM: E78.2  ICD-9-CM: 272.2  8/26/2019        Mild intermittent asthma without complication FABIANO-39-YX: A68.61  ICD-9-CM: 493.90  5/9/2019    Overview Signed 5/9/2019 12:22 PM by Bo Nieto MD     2-3 x a year triggered by peak allergy time or URI's             Perennial allergic rhinitis with seasonal variation ICD-10-CM: J30.89, J30.2  ICD-9-CM: 477.9  5/9/2019    Overview Signed 5/9/2019 12:27 PM by Bo Nieto MD     Peaks spring and fall             Gross hematuria ICD-10-CM: R31.0  ICD-9-CM: 599.71  11/30/2017        DDD (degenerative disc disease), cervical ICD-10-CM: M50.30  ICD-9-CM: 722.4  7/14/2016        Cervical spinal stenosis w/ right radiculopathy ICD-10-CM: M48.02  ICD-9-CM: 723.0  7/14/2016    Overview Signed 7/14/2016 8:43 AM by Nilson Caldwell MD     2016 Dr Timbo Gonzalez, PT             Screening exams for skin cancer ICD-10-CM: Z12.83  ICD-9-CM: V76.43  3/30/2016    Overview Signed 3/30/2016 11:22 AM by Nilson Caldwell MD     Dermatologist routine checkes             Endometrial cancer [facility]) s/p Total Lap Hysterectomy-BSO 9-2015 ICD-10-CM: C54.1  ICD-9-CM: 182.0  9/10/2015    Overview Signed 10/13/2015  1:13 PM by Nilson Caldwell MD     [employer], [place], Dr Asuncion Beck              Avulsion fracture of right ankle ICD-10-CM: S82.899A  ICD-9-CM: 824.8  7/10/2015    Overview Signed 7/10/2015  9:02 AM by Nilson Caldwell MD     ~6-2015, Cecienia Duane, boot             Baker's cyst ICD-10-CM: M71.20  ICD-9-CM: 727.51  12/9/2013    Overview Signed 12/9/2013 11:58 AM by Nilson Caldwell MD     Left knee; 12/6/13, Dr Charity Vazquez             Vitamin D deficiency ICD-10-CM: E55.9  ICD-9-CM: 268.9  12/23/2012        Postmenopause, LMP 44 yo, No HRT ICD-10-CM: Z78.0  ICD-9-CM: V49.81  12/6/2012        Seasonal allergic rhinitis ICD-10-CM: J30.2  ICD-9-CM: 477.9  12/6/2012    Overview Signed 12/6/2012  2:27 PM by Nilson Caldwell MD     Spring and Fall worst             Pneumonia ICD-10-CM: J18.9  ICD-9-CM: 059  12/6/2012    Overview Signed 12/6/2012  2:28 PM by Nilson Caldwell MD     Spring 2012, treated outpt, [facility]             History of depression ICD-10-CM: Z86.59  ICD-9-CM: V11.8  12/6/2012    Overview Addendum 6/26/2013  2:16 PM by Nilsno Caldwell MD     Treated sporadically over the years w/ Effexor or Celexa (better)             Chronic depression ICD-10-CM: F32.9  ICD-9-CM: 665  12/6/2012        H/O Recurrent Asthmatic Bronchitis ICD-10-CM: J45.909  ICD-9-CM: 493.90  12/6/2012    Overview Signed 12/6/2012  2:38 PM by Nilson Caldwell MD     Since ~ 2006             OA (osteoarthritis) of left knee ICD-10-CM: M17.10  ICD-9-CM: 715.36  12/6/2012    Overview Addendum 3/25/2015 12:00 PM by Charity Henderson MD     Xrays 2011; Ortho Dr Sal Salvador, 12/2013, xrayed again, advanced DJD/OA ; CSI 12/2013, 3/2014, 6/2014, 12/2014                       PAST SURGICAL HISTORY     Past Surgical History:   Procedure Laterality Date    HX BREAST BIOPSY  12/17/12    right breast, benign, calcifications; Rubens Lyman; Fibrosis    HX COLONOSCOPY  4-, Dr Liban Cristina    Normal, follow up 10 yrs    HX KNEE REPLACEMENT Left 08/2016    Dr Dre Miranda    [address] from right upper back    HX TOTAL LAPAROSCOPIC HYSTERECTOMY W/ BS&O  9/24/2015, Dr Pema Lopez    Total Lap Hysterectomy and BSO; Endometrial cancer; grade 1 endometrioid adenocarcinoma.; Dr. Rocco Chery, 25 yo         MEDICATIONS     Current Outpatient Medications   Medication Sig    atorvastatin (LIPITOR) 10 mg tablet TAKE 1 TABLET EVERY DAY    cholecalciferol, VITAMIN D3, (VITAMIN D3) 5,000 unit tab tablet Take 1 Tab by mouth daily.  multivitamin (ONE A DAY) tablet Take 1 Tab by mouth daily. Includes Vitamin D 1,000 units    diclofenac EC (VOLTAREN) 75 mg EC tablet TAKE 1 TABLET BY MOUTH TWICE A DAY    albuterol (PROVENTIL HFA, VENTOLIN HFA, PROAIR HFA) 90 mcg/actuation inhaler Take 2 Puffs by inhalation every six (6) hours as needed for Wheezing (Tightness and Coughing).  cetirizine (ZYRTEC) 10 mg tablet Take 10 mg by mouth daily. Indications: Non-Seasonal Allergic Runny Nose    fluticasone (FLONASE) 50 mcg/actuation nasal spray USE 2 SPRAYS IN EACH NOSTRIL AT BEDTIME (Patient taking differently: USE 2 SPRAYS IN EACH NOSTRIL AT BEDTIME AS NEEDED)     No current facility-administered medications for this visit.            ALLERGIES     Allergies   Allergen Reactions    Sulfa (Sulfonamide Antibiotics) Other (comments)     Causes migraines    Bactrim [Sulfamethoprim Ds] Other (comments)     headaches    Tramadol Itching          SOCIAL HISTORY     Social History     Socioeconomic History    Marital status:      Spouse name: Not on file    Number of children: 3    Years of education: Not on file    Highest education level: Not on file   Occupational History    Occupation:  at [employer]   Tobacco Use    Smoking status: Former Smoker     Last attempt to quit: 1985     Years since quittin.6    Smokeless tobacco: Never Used    Tobacco comment: used to be a light smoker x 3 yrs   Substance and Sexual Activity    Alcohol use:  Yes     Alcohol/week: 0.0 standard drinks     Comment: rare, maybe 1-2 glasses of wine a month at most    Drug use: No    Sexual activity: Yes     Partners: Male     Comment: Postmenopause   Other Topics Concern    Caffeine Concern No     Comment: 1 cup of coffee  qd    Weight Concern Yes     Comment: wants to lose wt    Special Diet No     Comment: not eating as healthy since     Exercise No     Comment: none x several months        IMMUNIZATIONS  Immunization History   Administered Date(s) Administered    Influenza Vaccine 2012, 2013    Influenza Vaccine (Quad) PF 2017    TDAP Vaccine 2006    Td 2016         FAMILY HISTORY     Family History   Problem Relation Age of Onset    Kidney Disease Mother         kidney transplant age 77yo ;  renal failure age 80    Diabetes Mother 36    High Cholesterol Mother     Arthritis-osteo Mother         B knee replacements in her 63's    Heart Disease Father     Diabetes Father     Dementia Father         Alzheimer's;  after fall and hip fx    Heart Surgery Father         CABG in his 63's   Saulo Morgan No Known Problems Son     Allergic Rhinitis Daughter     No Known Problems Daughter     Hypertension Brother     Hypertension Brother     Diabetes Brother         mild, controlling w/ diet    Diabetes Maternal Grandfather     Heart Attack Maternal Grandfather 62  MI          VITALS     Visit Vitals  /74 (BP 1 Location: Left arm, BP Patient Position: Sitting)   Pulse 78   Temp 98.2 °F (36.8 °C) (Oral)   Resp 16   Ht 5' 7.9\" (1.725 m)   Wt 241 lb 12.8 oz (109.7 kg)   LMP 2011 (Within Years)   SpO2 98%   BMI 36.87 kg/m²          PHYSICAL EXAMINATION   Physical Exam   Constitutional: No distress. Cardiovascular: Normal rate, regular rhythm and normal heart sounds. No murmur heard. Pulmonary/Chest: Effort normal and breath sounds normal.   Abdominal: Soft. There is no tenderness. Vitals reviewed. DIAGNOSTIC DATA         LABORATORY DATA     Results for orders placed or performed in visit on 19   HEPATIC FUNCTION PANEL   Result Value Ref Range    Protein, total 6.8 6.0 - 8.5 g/dL    Albumin 4.4 3.5 - 5.5 g/dL    Bilirubin, total 0.4 0.0 - 1.2 mg/dL    Bilirubin, direct 0.13 0.00 - 0.40 mg/dL    Alk.  phosphatase 74 39 - 117 IU/L    AST (SGOT) 22 0 - 40 IU/L    ALT (SGPT) 29 0 - 32 IU/L       Lab Results   Component Value Date/Time    WBC 5.7 2019 12:37 PM    HGB 13.1 2019 12:37 PM    HCT 40.1 2019 12:37 PM    PLATELET 776  12:37 PM    MCV 89 2019 12:37 PM     Lab Results   Component Value Date/Time    Hemoglobin A1c 5.8 (H) 2019 12:37 PM    Glucose 83 2019 12:37 PM    LDL, calculated 156 (H) 2019 12:37 PM    Creatinine 0.79 2019 12:37 PM      Lab Results   Component Value Date/Time    Cholesterol, total 234 (H) 2019 12:37 PM    HDL Cholesterol 41 2019 12:37 PM    LDL, calculated 156 (H) 2019 12:37 PM    Triglyceride 186 (H) 2019 12:37 PM     Lab Results   Component Value Date/Time    TSH 2.070 2019 12:37 PM      Lab Results   Component Value Date/Time    Sodium 140 2019 12:37 PM    Potassium 4.6 2019 12:37 PM    Chloride 101 2019 12:37 PM    CO2 25 2019 12:37 PM    Glucose 83 2019 12:37 PM    BUN 19 2019 12:37 PM Creatinine 0.79 05/09/2019 12:37 PM    BUN/Creatinine ratio 24 (H) 05/09/2019 12:37 PM    GFR est AA 95 05/09/2019 12:37 PM    GFR est non-AA 82 05/09/2019 12:37 PM    Calcium 9.8 05/09/2019 12:37 PM    Bilirubin, total 0.4 06/28/2019 11:53 AM    ALT (SGPT) 29 06/28/2019 11:53 AM    AST (SGOT) 22 06/28/2019 11:53 AM    Alk. phosphatase 74 06/28/2019 11:53 AM    Protein, total 6.8 06/28/2019 11:53 AM    Albumin 4.4 06/28/2019 11:53 AM    A-G Ratio 2.5 (H) 05/09/2019 12:37 PM          ASSESSMENT & PLAN       ICD-10-CM ICD-9-CM    1. Mixed hyperlipidemia E78.2 272.2 LIPID PANEL   2. Vitamin D deficiency E55.9 268.9 VITAMIN D, 25 HYDROXY   3. Elevated ALT measurement R74.0 790.4 ALT      AST     Fasting labs today  Cardiovascular risk and specific lipid/LDL goals reviewed  After her last visit in May, she has since resumed Lipitor daily, tolerating w/o reaction or side effects. Also on 5,000 units vitamin D daily now and here for recheck of that as well. Nothing special w/ diet or exercise right now, but plans to do 60 day trial of [facility]'s Prep Program and needs form signed. Will be meeting w/ dietician and doing stationary bike. Reviewed medications and side effects  Further follow up & other recommendations pending review of labs.  If all remains good and stable, follow up in 1 yr for fasting CPE, sooner prn  She will need Lipitor renewed after review of today's labs

## 2019-08-26 NOTE — PROGRESS NOTES
Chief Complaint   Patient presents with    Cholesterol Problem     fasting     1. Have you been to the ER, urgent care clinic since your last visit? Hospitalized since your last visit? Yes When: June 2 broke L ankle    2. Have you seen or consulted any other health care providers outside of the [address] [place] [address] since your last visit? Include any pap smears or colon screening.  Yes Where: Dr Deepa Dong

## 2019-08-26 NOTE — PATIENT INSTRUCTIONS

## 2019-08-27 LAB
25(OH)D3+25(OH)D2 SERPL-MCNC: 52.2 NG/ML (ref 30–100)
ALT SERPL-CCNC: 33 IU/L (ref 0–32)
AST SERPL-CCNC: 26 IU/L (ref 0–40)
CHOLEST SERPL-MCNC: 188 MG/DL (ref 100–199)
HDLC SERPL-MCNC: 45 MG/DL
INTERPRETATION, 910389: NORMAL
LDLC SERPL CALC-MCNC: 113 MG/DL (ref 0–99)
TRIGL SERPL-MCNC: 152 MG/DL (ref 0–149)
VLDLC SERPL CALC-MCNC: 30 MG/DL (ref 5–40)

## 2019-08-29 ENCOUNTER — PATIENT MESSAGE (OUTPATIENT)
Dept: FAMILY MEDICINE CLINIC | Age: 59
End: 2019-08-29

## 2019-08-29 RX ORDER — ATORVASTATIN CALCIUM 10 MG/1
TABLET, FILM COATED ORAL
Qty: 90 TAB | Refills: 1 | Status: SHIPPED | OUTPATIENT
Start: 2019-08-29 | End: 2020-07-30 | Stop reason: SDUPTHER

## 2019-10-01 ENCOUNTER — OFFICE VISIT (OUTPATIENT)
Dept: FAMILY MEDICINE CLINIC | Age: 59
End: 2019-10-01

## 2019-10-01 VITALS
HEART RATE: 86 BPM | RESPIRATION RATE: 20 BRPM | WEIGHT: 241 LBS | TEMPERATURE: 98 F | DIASTOLIC BLOOD PRESSURE: 82 MMHG | OXYGEN SATURATION: 97 % | BODY MASS INDEX: 36.53 KG/M2 | SYSTOLIC BLOOD PRESSURE: 118 MMHG | HEIGHT: 68 IN

## 2019-10-01 DIAGNOSIS — J01.00 ACUTE NON-RECURRENT MAXILLARY SINUSITIS: Primary | ICD-10-CM

## 2019-10-01 DIAGNOSIS — H61.21 IMPACTED CERUMEN OF RIGHT EAR: ICD-10-CM

## 2019-10-01 PROBLEM — E66.01 SEVERE OBESITY (HCC): Status: ACTIVE | Noted: 2019-10-01

## 2019-10-01 RX ORDER — AMOXICILLIN AND CLAVULANATE POTASSIUM 875; 125 MG/1; MG/1
1 TABLET, FILM COATED ORAL 2 TIMES DAILY
Qty: 14 TAB | Refills: 0 | Status: SHIPPED | OUTPATIENT
Start: 2019-10-01 | End: 2019-10-08

## 2019-10-01 NOTE — PROGRESS NOTES
Chief Complaint   Patient presents with    Nasal Congestion     pt started 4 days ago with nasal congestion and cough. taking Nyquil at nights. \"REVIEWED RECORD IN PREPARATION FOR VISIT AND HAVE OBTAINED THE NECESSARY DOCUMENTATION\"  1. Have you been to the ER, urgent care clinic since your last visit? Hospitalized since your last visit? No    2. Have you seen or consulted any other health care providers outside of the [address] [place] [address] since your last visit? Include any pap smears or colon screening.  No

## 2019-10-01 NOTE — PATIENT INSTRUCTIONS
Sinusitis: Care Instructions  Your Care Instructions    Sinusitis is an infection of the lining of the sinus cavities in your head. Sinusitis often follows a cold. It causes pain and pressure in your head and face. In most cases, sinusitis gets better on its own in 1 to 2 weeks. But some mild symptoms may last for several weeks. Sometimes antibiotics are needed. Follow-up care is a key part of your treatment and safety. Be sure to make and go to all appointments, and call your doctor if you are having problems. It's also a good idea to know your test results and keep a list of the medicines you take. How can you care for yourself at home? · Take an over-the-counter pain medicine, such as acetaminophen (Tylenol), ibuprofen (Advil, Motrin), or naproxen (Aleve). Read and follow all instructions on the label. · If the doctor prescribed antibiotics, take them as directed. Do not stop taking them just because you feel better. You need to take the full course of antibiotics. · Be careful when taking over-the-counter cold or flu medicines and Tylenol at the same time. Many of these medicines have acetaminophen, which is Tylenol. Read the labels to make sure that you are not taking more than the recommended dose. Too much acetaminophen (Tylenol) can be harmful. · Breathe warm, moist air from a steamy shower, a hot bath, or a sink filled with hot water. Avoid cold, dry air. Using a humidifier in your home may help. Follow the directions for cleaning the machine. · Use saline (saltwater) nasal washes to help keep your nasal passages open and wash out mucus and bacteria. You can buy saline nose drops at a grocery store or drugstore. Or you can make your own at home by adding 1 teaspoon of salt and 1 teaspoon of baking soda to 2 cups of distilled water. If you make your own, fill a bulb syringe with the solution, insert the tip into your nostril, and squeeze gently. Segundo Roch your nose.   · Put a hot, wet towel or a warm gel pack on your face 3 or 4 times a day for 5 to 10 minutes each time. · Try a decongestant nasal spray like oxymetazoline (Afrin). Do not use it for more than 3 days in a row. Using it for more than 3 days can make your congestion worse. When should you call for help? Call your doctor now or seek immediate medical care if:    · You have new or worse swelling or redness in your face or around your eyes.     · You have a new or higher fever.    Watch closely for changes in your health, and be sure to contact your doctor if:    · You have new or worse facial pain.     · The mucus from your nose becomes thicker (like pus) or has new blood in it.     · You are not getting better as expected. Where can you learn more? Go to http://radha-jihan.info/. Enter V610 in the search box to learn more about \"Sinusitis: Care Instructions. \"  Current as of: October 21, 2018  Content Version: 12.2  © 4135-9974 [employer]. Care instructions adapted under license by [employer] (which disclaims liability or warranty for this information). If you have questions about a medical condition or this instruction, always ask your healthcare professional. [place] [address] any warranty or liability for your use of this information. Saline Nasal Washes: Care Instructions  Your Care Instructions  Saline nasal washes help keep the nasal passages open by washing out thick or dried mucus. This simple remedy can help relieve symptoms of allergies, sinusitis, and colds. It also can make the nose feel more comfortable by keeping the mucous membranes moist. You may notice a little burning sensation in your nose the first few times you use the solution, but this usually gets better in a few days. Follow-up care is a key part of your treatment and safety. Be sure to make and go to all appointments, and call your doctor if you are having problems.  It's also a good idea to know your test results and keep a list of the medicines you take. How can you care for yourself at home? · You can buy premixed saline solution in a squeeze bottle or other sinus rinse products at a drugstore. Read and follow the instructions on the label. · You also can make your own saline solution by adding 1 teaspoon of salt and 1 teaspoon of baking soda to 2 cups of distilled water. · If you use a homemade solution, pour a small amount into a clean bowl. Using a rubber bulb syringe, squeeze the syringe and place the tip in the salt water. Pull a small amount of the salt water into the syringe by relaxing your hand. · Sit down with your head tilted slightly back. Do not lie down. Put the tip of the bulb syringe or the squeeze bottle a little way into one of your nostrils. Gently drip or squirt a few drops into the nostril. Repeat with the other nostril. Some sneezing and gagging are normal at first.  · Gently blow your nose. · Wipe the syringe or bottle tip clean after each use. · Repeat this 2 or 3 times a day. · Use nasal washes gently if you have nosebleeds often. When should you call for help? Watch closely for changes in your health, and be sure to contact your doctor if:    · You often get nosebleeds.     · You have problems doing the nasal washes. Where can you learn more? Go to http://radha-jihan.info/. Enter [id number] in the search box to learn more about \"Saline Nasal Washes: Care Instructions. \"  Current as of: October 21, 2018  Content Version: 12.2  © 8642-8695 [employer]. Care instructions adapted under license by [employer] (which disclaims liability or warranty for this information). If you have questions about a medical condition or this instruction, always ask your healthcare professional. [address] any warranty or liability for your use of this information.

## 2019-10-01 NOTE — PROGRESS NOTES
[address] [facility] Note  Subjective:      Christel Hanson is a 60 y.o. female who presents for an acute visit with the following chief complaints. Chief Complaint   Patient presents with    Nasal Congestion     pt started 4 days ago with nasal congestion and cough. taking Nyquil at nights. Upper Respiratory Infection  Patient complains of symptoms of a URI. Symptoms include right ear pain, plugged sensation in bilateral ear, nasal congestion, sinus pressure, and cough. Onset of symptoms was 4 days ago, gradually worsening in the past 24 hours. She also reports feeling feverish at the onset, achiness, which has minimally improved. Cough is minimally productive and is without wheezing, chest pain or shortness of breath. She has history of asthma, allergic rhinitis which she states have both been well controlled recently. She is drinking moderate amounts of fluids. Evaluation to date: none. Treatment to date: Flonase, zyrtec, Dayquil, Nyquil with little relief. Tried nasal saline rinse but reports this was not helpful and she was unable to clear sinus passage way. Recent travel to [facility] 5 days ago. Recent sick contacts at work. Current Outpatient Medications   Medication Sig Dispense Refill    amoxicillin-clavulanate (AUGMENTIN) 875-125 mg per tablet Take 1 Tab by mouth two (2) times a day for 7 days. Indications: Acute Bacterial Infection of the Sinuses 14 Tab 0    atorvastatin (LIPITOR) 10 mg tablet TAKE 1 TABLET BY MOUTH EVERY DAY 90 Tab 1    cholecalciferol, VITAMIN D3, (VITAMIN D3) 5,000 unit tab tablet Take 1 Tab by mouth daily.  multivitamin (ONE A DAY) tablet Take 1 Tab by mouth daily. Includes Vitamin D 1,000 units      cetirizine (ZYRTEC) 10 mg tablet Take 10 mg by mouth daily.  Indications: Non-Seasonal Allergic Runny Nose      fluticasone (FLONASE) 50 mcg/actuation nasal spray USE 2 SPRAYS IN EACH NOSTRIL AT BEDTIME (Patient taking differently: USE 2 SPRAYS IN EACH NOSTRIL AT BEDTIME AS NEEDED) 1 Bottle 11    albuterol (PROVENTIL HFA, VENTOLIN HFA, PROAIR HFA) 90 mcg/actuation inhaler Take 2 Puffs by inhalation every six (6) hours as needed for Wheezing (Tightness and Coughing). 1 Inhaler 0     Allergies   Allergen Reactions    Sulfa (Sulfonamide Antibiotics) Other (comments)     Causes migraines    Bactrim [Sulfamethoprim Ds] Other (comments)     headaches    Tramadol Itching       ROS:   Complete review of systems was reviewed with pertinent information listed in HPI. Review of Systems   Constitutional: Positive for chills and malaise/fatigue. Negative for diaphoresis, fever and weight loss. HENT: Positive for congestion and sinus pain. Negative for ear pain, hearing loss, sore throat and tinnitus. Eyes: Negative for blurred vision. Respiratory: Positive for cough and sputum production. Negative for hemoptysis, shortness of breath and wheezing. Cardiovascular: Negative for chest pain and palpitations. Gastrointestinal: Negative for abdominal pain, diarrhea, heartburn, nausea and vomiting. Genitourinary: Negative for dysuria. Musculoskeletal: Negative for myalgias. See HPI   Skin: Negative for itching and rash. Neurological: Positive for headaches. Negative for dizziness, tingling, tremors, sensory change, speech change and focal weakness. Bilateral frontal headache, sinus pressure        Objective:     Visit Vitals  /82   Pulse 86   Temp 98 °F (36.7 °C) (Oral)   Resp 20   Ht 5' 7.9\" (1.725 m)   Wt 241 lb (109.3 kg)   LMP 05/09/2011 (Within Years)   SpO2 97%   BMI 36.75 kg/m²       Vitals and Nurse Documentation reviewed. Physical Exam   Constitutional: She is oriented to person, place, and time and well-developed, well-nourished, and in no distress. She does not have a sickly appearance. HENT:   Head: Normocephalic and atraumatic.    Right Ear: Hearing, external ear and ear canal normal. No drainage, swelling or tenderness. Tympanic membrane is erythematous. Tympanic membrane is not injected, not scarred, not perforated, not retracted and not bulging. A middle ear effusion is present. No decreased hearing is noted. Left Ear: Hearing, external ear and ear canal normal. No drainage, swelling or tenderness. Tympanic membrane is erythematous. Tympanic membrane is not injected, not scarred, not perforated, not retracted and not bulging. A middle ear effusion is present. No decreased hearing is noted. Nose: Mucosal edema and rhinorrhea present. Right sinus exhibits maxillary sinus tenderness. Right sinus exhibits no frontal sinus tenderness. Left sinus exhibits maxillary sinus tenderness. Left sinus exhibits no frontal sinus tenderness. Mouth/Throat: Uvula is midline and mucous membranes are normal. Mucous membranes are not pale, not dry and not cyanotic. Posterior oropharyngeal erythema present. No oropharyngeal exudate, posterior oropharyngeal edema or tonsillar abscesses. Right cerumen impaction cleared without difficu   Eyes: Pupils are equal, round, and reactive to light. Conjunctivae and lids are normal. Right conjunctiva is not injected. Left conjunctiva is not injected. Neck: Trachea normal, normal range of motion and phonation normal. Neck supple. No tracheal deviation present. Cardiovascular: Normal rate, regular rhythm, S1 normal, S2 normal, normal heart sounds and intact distal pulses. Exam reveals no gallop and no friction rub. No murmur heard. Pulmonary/Chest: Effort normal and breath sounds normal. No accessory muscle usage. No respiratory distress. She has no decreased breath sounds. She has no wheezes. She has no rhonchi. She has no rales. She exhibits no tenderness. Musculoskeletal: She exhibits no edema. Lymphadenopathy:     She has no cervical adenopathy. Neurological: She is alert and oriented to person, place, and time. Gait normal.   Skin: Skin is warm, dry and intact. No rash noted. She is not diaphoretic. Psychiatric: Mood and affect normal.   Nursing note and vitals reviewed. Procedure Note for removal of cerumen impaction:  Large amounts of soft ear wax of right ear canal.  Cerumen was removed by lavage and curette/ manual debridement, she tolerated fairly well, no complications. Assessment/Plan:     Diagnoses and all orders for this visit:    1. Acute non-recurrent maxillary sinusitis: Day 5 of symptoms consistent with sinusitis, acutely worsening over the past 24 hours. Start antibiotic therapy; risks and benefits reviewed. Discussed continued symptomatic therapy; Rest, increase fluids, NSAID's PRN fever and/or discomfort. Flonase, mucinex, nasal saline rinses for nasal congestion. Lungs are clear, nonrespiratory distress or no adventitious breath sounds on exam. Does not appear asthma is exacerbated. Advised Albuterol PRN. RTC if symptoms worsen or do not resolve. -     amoxicillin-clavulanate (AUGMENTIN) 875-125 mg per tablet; Take 1 Tab by mouth two (2) times a day for 7 days. Indications: Acute Bacterial Infection of the Sinuses    2. Impacted cerumen of right ear: Resolved. -     REMOVAL IMPACTED CERUMEN IRRIGATION/LVG UNILAT        Follow-up and Dispositions    · Return if symptoms worsen or fail to improve.        Discussed expected course/resolution/complications of diagnosis in detail with patient.    Medication risks/benefits/costs/interactions/alternatives discussed with patient.    Pt was given an after visit summary which includes diagnoses, current medications & vitals.  Pt expressed understanding with the diagnosis and plan

## 2019-10-15 ENCOUNTER — OFFICE VISIT (OUTPATIENT)
Dept: FAMILY MEDICINE CLINIC | Age: 59
End: 2019-10-15

## 2019-10-15 VITALS
WEIGHT: 242 LBS | OXYGEN SATURATION: 96 % | HEART RATE: 74 BPM | DIASTOLIC BLOOD PRESSURE: 70 MMHG | BODY MASS INDEX: 36.68 KG/M2 | SYSTOLIC BLOOD PRESSURE: 120 MMHG | TEMPERATURE: 98.6 F | RESPIRATION RATE: 16 BRPM | HEIGHT: 68 IN

## 2019-10-15 DIAGNOSIS — J45.31 MILD PERSISTENT ASTHMA WITH ACUTE BRONCHITIS AND ACUTE EXACERBATION: ICD-10-CM

## 2019-10-15 DIAGNOSIS — J40 SINOBRONCHITIS: Primary | ICD-10-CM

## 2019-10-15 DIAGNOSIS — J32.9 SINOBRONCHITIS: Primary | ICD-10-CM

## 2019-10-15 DIAGNOSIS — J20.9 MILD PERSISTENT ASTHMA WITH ACUTE BRONCHITIS AND ACUTE EXACERBATION: ICD-10-CM

## 2019-10-15 RX ORDER — PREDNISONE 20 MG/1
20 TABLET ORAL 2 TIMES DAILY WITH MEALS
Qty: 10 TAB | Refills: 0 | Status: SHIPPED | OUTPATIENT
Start: 2019-10-15 | End: 2019-10-20

## 2019-10-15 RX ORDER — FLUTICASONE FUROATE AND VILANTEROL 100; 25 UG/1; UG/1
1 POWDER RESPIRATORY (INHALATION) DAILY
Qty: 1 INHALER | Refills: 2 | Status: SHIPPED | OUTPATIENT
Start: 2019-10-15 | End: 2020-02-04

## 2019-10-15 RX ORDER — AZITHROMYCIN 250 MG/1
TABLET, FILM COATED ORAL
Qty: 6 TAB | Refills: 0 | Status: SHIPPED | OUTPATIENT
Start: 2019-10-15 | End: 2019-10-20

## 2019-10-15 NOTE — PATIENT INSTRUCTIONS
Asthma in Adults: Care Instructions  Your Care Instructions    During an asthma attack, your airways swell and narrow as a reaction to certain things (triggers). This makes it hard to breathe. You may be able to prevent asthma attacks if you avoid the things that set off your asthma symptoms. Keeping your asthma under control and treating symptoms before they get bad can help you avoid severe attacks. If you can control your asthma, you may be able to do all of your normal daily activities. You may also avoid asthma attacks and trips to the hospital.  Follow-up care is a key part of your treatment and safety. Be sure to make and go to all appointments, and call your doctor if you are having problems. It's also a good idea to know your test results and keep a list of the medicines you take. How can you care for yourself at home? · Follow your asthma action plan so you can manage your symptoms at home. An asthma action plan will help you prevent and control airway reactions and will tell you what to do during an asthma attack. If you do not have an asthma action plan, work with your doctor to build one. · Take your asthma medicine exactly as prescribed. Medicine plays an important role in controlling asthma. Talk to your doctor right away if you have any questions about what to take and how to take it. ? Use your quick-relief medicine when you have symptoms of an attack. Quick-relief medicine often is an albuterol inhaler. Some people need to use quick-relief medicine before they exercise. ? Take your controller medicine every day, not just when you have symptoms. Controller medicine is usually an inhaled corticosteroid. The goal is to prevent problems before they occur. Do not use your controller medicine to try to treat an attack that has already started. It does not work fast enough to help. ? If your doctor prescribed corticosteroid pills to use during an attack, take them as directed.  They may take hours to work, but they may shorten the attack and help you breathe better. ? Keep your quick-relief medicine with you at all times. · Talk to your doctor before using other medicines. Some medicines, such as aspirin, can cause asthma attacks in some people. · Check yourself for asthma symptoms to know which step to follow in your action plan. Watch for things like being short of breath, having chest tightness, coughing, and wheezing. Also notice if symptoms wake you up at night or if you get tired quickly when you exercise. · If you have a peak flow meter, use it to check how well you are breathing. This can help you predict when an asthma attack is going to occur. Then you can take medicine to prevent the asthma attack or make it less severe. · See your doctor regularly. These visits will help you learn more about asthma and what you can do to control it. Your doctor will monitor your treatment to make sure the medicine is helping you. · Keep track of your asthma attacks and your treatment. After you have had an attack, write down what triggered it, what helped end it, and any concerns you have about your asthma action plan. Take your diary when you see your doctor. You can then review your asthma action plan and decide if it is working. · Do not smoke or allow others to smoke around you. Avoid smoky places. Smoking makes asthma worse. If you need help quitting, talk to your doctor about stop-smoking programs and medicines. These can increase your chances of quitting for good. · Learn what triggers an asthma attack for you, and avoid the triggers when you can. Common triggers include colds, smoke, air pollution, dust, pollen, mold, pets, cockroaches, stress, and cold air. · Avoid colds and the flu. Get a pneumococcal vaccine shot. If you have had one before, ask your doctor whether you need a second dose. Get a flu vaccine every fall.  If you must be around people with colds or the flu, wash your hands often.  When should you call for help? Call 911 anytime you think you may need emergency care. For example, call if:    · You have severe trouble breathing.    Call your doctor now or seek immediate medical care if:    · Your symptoms do not get better after you have followed your asthma action plan.     · You cough up yellow, dark brown, or bloody mucus (sputum).    Watch closely for changes in your health, and be sure to contact your doctor if:    · Your coughing and wheezing get worse.     · You need to use quick-relief medicine on more than 2 days a week (unless it is just for exercise).     · You need help figuring out what is triggering your asthma attacks. Where can you learn more? Go to http://radha-jihan.info/. Enter P597 in the search box to learn more about \"Asthma in Adults: Care Instructions. \"  Current as of: June 9, 2019  Content Version: 12.2  © 0090-7018 [employer]. Care instructions adapted under license by [employer] (which disclaims liability or warranty for this information). If you have questions about a medical condition or this instruction, always ask your healthcare professional. [place] [address] any warranty or liability for your use of this information.

## 2019-10-15 NOTE — PROGRESS NOTES
Chief Complaint   Patient presents with    Cough     x 1 week, w/ occasional green sputum    Chest Congestion     x 1 week    Nasal Congestion     x 2 weeks   HISTORY OF PRESENT ILLNESS  Job Wren is a 60 y.o. female. HPI  URI/Respiratory Illness  -Duration or Onset: 1-2 weeks    ~Symptoms: head pressure, sinus pressure, nasal congestion, drainage, cough x 2 weeks; chest congestion, increased coughing fits occ productive of thick green sputum, chest tightness, wheezing, sob; using her Albuterol inhaler 2-3 x a day the past 1 1/2-2 weeks; left ear pressure/fullness x 1 week; subjective fever last night but didn't check temp    ~Negatives: denies sore throat, ear pain, pleuritic pain    -OTC remedies used to date: saline nasal spray, sudafed, Nyquil    -Known Contact/Exposures: unknown    -Previous Medical Treatment: 10-1-19: seen by RAMIRO Baumann for 4 day h/o sinus symptoms, started on Augmentin bid, completed and those symptoms got better but returned over a week ago    -History of Allergic Rhinitis: Yes, uses Flonase, Zyrtec daily    -History of Asthma: Yes, typically mild, intermittent a few x a year but the past few weeks having daily symptoms; usual triggers are URI's and allergies    -History of COPD: No    Review of Systems   HENT: Positive for congestion. Negative for ear pain and sore throat. Eyes: Negative. Respiratory: Positive for cough, sputum production, shortness of breath and wheezing. Gastrointestinal: Negative.       Past Medical History:   Diagnosis Date    Avulsion fracture of right ankle 7/10/2015    ~6-2015, [facility]    Baker's cyst 12/9/2013    Left knee; 12/6/13, Dr Silveira Pu Cervical spinal stenosis w/ right radiculopathy 7/14/2016    2016 Dr Katherine Rodarte, PT    Chronic depression 12/6/2012    Closed fracture of left ankle 8/26/2019 6-1-19, boot, no surgery    DDD (degenerative disc disease), cervical 7/14/2016    Endometrial cancer ([facility] [address] s/p Total Lap Hysterectomy-BSO 9-2015 9/10/2015    Grade 1 Endometrioid Carcinoma, Gyn Dr Irwin Elizalde, Dr Charlene Kraft H/O Recurrent Asthmatic Bronchitis 12/6/2012    History of depression 12/6/2012    Hyperlipidemia 12/6/2012    Mild intermittent asthma without complication 9/4/3656    2-3 x a year triggered by peak allergy time or URI's    OA (osteoarthritis) of left knee 12/6/2012    Perennial allergic rhinitis with seasonal variation 5/9/2019    [place] spring and fall    Pneumonia 12/6/2012    Postmenopause, LMP 46 yo, No HRT 12/6/2012    Screening exams for skin cancer 3/30/2016    Dermatologist routine checkes    UTI (urinary tract infection) 10/2017    Vitamin D deficiency 12/23/2012     Past Surgical History:   Procedure Laterality Date    HX BREAST BIOPSY  12/17/12    right breast, benign, calcifications; [facility]; Fibrosis    HX COLONOSCOPY  4-, Dr Jemima Davila    Normal, follow up 10 yrs    HX KNEE REPLACEMENT Left 08/2016    Dr Wes Wetzel SKIN BIOPSY  2014    [facility] removed from right upper back    HX TOTAL LAPAROSCOPIC HYSTERECTOMY W/ BS&O  9/24/2015, Dr Rivka Cavazos    Total Lap Hysterectomy and BSO; Endometrial cancer; grade 1 endometrioid adenocarcinoma.; Dr. Yasmeen Clark, 25 yo     Current Outpatient Medications   Medication Sig    atorvastatin (LIPITOR) 10 mg tablet TAKE 1 TABLET BY MOUTH EVERY DAY    cholecalciferol, VITAMIN D3, (VITAMIN D3) 5,000 unit tab tablet Take 1 Tab by mouth daily.  multivitamin (ONE A DAY) tablet Take 1 Tab by mouth daily. Includes Vitamin D 1,000 units    albuterol (PROVENTIL HFA, VENTOLIN HFA, PROAIR HFA) 90 mcg/actuation inhaler Take 2 Puffs by inhalation every six (6) hours as needed for Wheezing (Tightness and Coughing).  cetirizine (ZYRTEC) 10 mg tablet Take 10 mg by mouth daily.  Indications: Non-Seasonal Allergic Runny Nose    fluticasone (FLONASE) 50 mcg/actuation nasal spray USE 2 SPRAYS IN [facility] NOSTRIL AT BEDTIME (Patient taking differently: USE 2 SPRAYS IN EACH NOSTRIL AT BEDTIME AS NEEDED)     No current facility-administered medications for this visit. Allergies   Allergen Reactions    Sulfa (Sulfonamide Antibiotics) Other (comments)     Causes migraines    Tramadol Itching     Social History     Tobacco Use    Smoking status: Former Smoker     Last attempt to quit: 1985     Years since quittin.8    Smokeless tobacco: Never Used    Tobacco comment: used to be a light smoker x 3 yrs   Substance Use Topics    Alcohol use: Yes     Alcohol/week: 0.0 standard drinks     Comment: rare, maybe 1-2 glasses of wine a month at most    Drug use: No     Immunization History   Administered Date(s) Administered    Influenza Vaccine 2012, 2013    Influenza Vaccine (Quad) PF 2017    TDAP Vaccine 2006    Td 2016     Visit Vitals  /70 (BP 1 Location: Left arm, BP Patient Position: Sitting)   Pulse 74   Temp 98.6 °F (37 °C) (Oral)   Resp 16   Ht 5' 7.9\" (1.725 m)   Wt 242 lb (109.8 kg)   LMP 2011 (Within Years)   SpO2 96%   BMI 36.90 kg/m²       Physical Exam   Constitutional: No distress. HENT:   Right Ear: Tympanic membrane normal.   Left Ear: Tympanic membrane normal.   Nose: Mucosal edema present. Right sinus exhibits no maxillary sinus tenderness and no frontal sinus tenderness. Left sinus exhibits maxillary sinus tenderness and frontal sinus tenderness. Mouth/Throat: Oropharynx is clear and moist. No oropharyngeal exudate. Neck: Neck supple. Cardiovascular: Normal rate, regular rhythm and normal heart sounds. No murmur heard. Pulmonary/Chest: Effort normal and breath sounds normal. No respiratory distress. She has no wheezes. She has no rales. Has not used her inhaler since last night, feels tight but lungs clear on exam today   Lymphadenopathy:     She has no cervical adenopathy. Skin: Skin is warm and dry.    Vitals reviewed. ASSESSMENT and PLAN    ICD-10-CM ICD-9-CM    1. Sinobronchitis J32.9 473.9 azithromycin (ZITHROMAX) 250 mg tablet    J40 490 predniSONE (DELTASONE) 20 mg tablet   2.  Mild persistent asthma with acute bronchitis and acute exacerbation J20.9 466.0 azithromycin (ZITHROMAX) 250 mg tablet    J45.31 493.92 predniSONE (DELTASONE) 20 mg tablet      fluticasone furoate-vilanterol (BREO ELLIPTA) 100-25 mcg/dose inhaler     Zpack as directed  Prednisone 20 mg bid x 5 days   Breo 100-25 1 inh daily through rest of fall and cold/flu season; rinse w/ mouthwash daily after usage  Reviewed medications, indications, usage, effects, and potential side effects in detail  Rest, push fluids, saline nasal/sinus rinses, Tylenol q 6 hr prn  Follow up if symptoms fail to improve along expectant course or sooner in the interim if anything worsens

## 2019-12-16 ENCOUNTER — OFFICE VISIT (OUTPATIENT)
Dept: FAMILY MEDICINE CLINIC | Age: 59
End: 2019-12-16

## 2019-12-16 VITALS
HEART RATE: 75 BPM | HEIGHT: 68 IN | TEMPERATURE: 98.3 F | RESPIRATION RATE: 16 BRPM | WEIGHT: 242 LBS | SYSTOLIC BLOOD PRESSURE: 140 MMHG | DIASTOLIC BLOOD PRESSURE: 92 MMHG | BODY MASS INDEX: 36.68 KG/M2 | OXYGEN SATURATION: 98 %

## 2019-12-16 DIAGNOSIS — R31.9 HEMATURIA, UNSPECIFIED TYPE: Primary | ICD-10-CM

## 2019-12-16 DIAGNOSIS — N30.01 ACUTE CYSTITIS WITH HEMATURIA: ICD-10-CM

## 2019-12-16 LAB
BILIRUB UR QL STRIP: NEGATIVE
GLUCOSE UR-MCNC: NEGATIVE MG/DL
KETONES P FAST UR STRIP-MCNC: NEGATIVE MG/DL
PH UR STRIP: 6 [PH] (ref 4.6–8)
PROT UR QL STRIP: NEGATIVE
SP GR UR STRIP: 1.02 (ref 1–1.03)
UA UROBILINOGEN AMB POC: NORMAL (ref 0.2–1)
URINALYSIS CLARITY POC: CLEAR
URINALYSIS COLOR POC: YELLOW
URINE BLOOD POC: NORMAL
URINE LEUKOCYTES POC: NORMAL
URINE NITRITES POC: NEGATIVE

## 2019-12-16 RX ORDER — CIPROFLOXACIN 250 MG/1
250 TABLET, FILM COATED ORAL EVERY 12 HOURS
Qty: 6 TAB | Refills: 0 | Status: SHIPPED | OUTPATIENT
Start: 2019-12-16 | End: 2019-12-19

## 2019-12-16 NOTE — PROGRESS NOTES
Assessment/Plan:     Diagnoses and all orders for this visit:    1. Hematuria, unspecified type  -     AMB POC URINALYSIS DIP STICK AUTO W/O MICRO    2. Acute cystitis with hematuria  -     ciprofloxacin HCl (CIPRO) 250 mg tablet; Take 1 Tab by mouth every twelve (12) hours for 3 days. -     CULTURE, URINE    Treatment as above. Culture pending. Continue with symptomatic care including fluids and rest.  Follow up if no improvement. Follow-up and Dispositions    · Return if symptoms worsen or fail to improve. Discussed expected course/resolution/complications of diagnosis in detail with patient. Medication risks/benefits/costs/interactions/alternatives discussed with patient. Pt was given after visit summary which includes diagnoses, current medications & vitals. Pt expressed understanding with the diagnosis and plan          Subjective:      Israel Guerrero is a 60 y.o. female who presents for had concerns including UTI; Urinary Frequency; Urinary Burning; and Urinary Odor. Urinary Tract Infection  Patient complains of dysuria, frequency, urgency. Onset was 4 days ago, gradually worsening since that time. . Patient denies fever. There is not any concern of sexual abuse. There is not a history of trauma to the genital area. Patient does not have a history of recurrent UTI. Patient does not have a history of pyelonephritis.         Patient Active Problem List   Diagnosis Code    Postmenopause, LMP 44 yo, No HRT Z78.0    Seasonal allergic rhinitis J30.2    Pneumonia J18.9    History of depression Z86.59    Chronic depression F32.9    H/O Recurrent Asthmatic Bronchitis J45.909    OA (osteoarthritis) of left knee M17.10    Vitamin D deficiency E55.9    Baker's cyst M71.20    Avulsion fracture of right ankle S82.899A    Endometrial cancer ([facility] [address] s/p Total Lap Hysterectomy-BSO 9-2015 C54.1    Screening exams for skin cancer Z12.83    DDD (degenerative disc disease), cervical M50.30    Cervical spinal stenosis w/ right radiculopathy M48.02    Gross hematuria R31.0    Mild intermittent asthma without complication E40.32    Perennial allergic rhinitis with seasonal variation J30.89, J30.2    Closed fracture of left ankle S82.892A    Mixed hyperlipidemia E78.2    Severe obesity (HCC) E66.01       Current Outpatient Medications   Medication Sig Dispense Refill    ciprofloxacin HCl (CIPRO) 250 mg tablet Take 1 Tab by mouth every twelve (12) hours for 3 days. 6 Tab 0    fluticasone furoate-vilanterol (BREO ELLIPTA) 100-25 mcg/dose inhaler Take 1 Puff by inhalation daily. 1 Inhaler 2    atorvastatin (LIPITOR) 10 mg tablet TAKE 1 TABLET BY MOUTH EVERY DAY 90 Tab 1    cholecalciferol, VITAMIN D3, (VITAMIN D3) 5,000 unit tab tablet Take 1 Tab by mouth daily.  multivitamin (ONE A DAY) tablet Take 1 Tab by mouth daily. Includes Vitamin D 1,000 units      albuterol (PROVENTIL HFA, VENTOLIN HFA, PROAIR HFA) 90 mcg/actuation inhaler Take 2 Puffs by inhalation every six (6) hours as needed for Wheezing (Tightness and Coughing). 1 Inhaler 0    cetirizine (ZYRTEC) 10 mg tablet Take 10 mg by mouth daily. Indications: Non-Seasonal Allergic Runny Nose      fluticasone (FLONASE) 50 mcg/actuation nasal spray USE 2 SPRAYS IN EACH NOSTRIL AT BEDTIME (Patient taking differently: USE 2 SPRAYS IN EACH NOSTRIL AT BEDTIME AS NEEDED) 1 Bottle 11       Allergies   Allergen Reactions    Sulfa (Sulfonamide Antibiotics) Other (comments)     Causes migraines    Tramadol Itching       ROS:   Review of Systems   Constitutional: Negative for malaise/fatigue. Eyes: Negative for blurred vision. Respiratory: Negative for shortness of breath. Cardiovascular: Negative for chest pain. Genitourinary: Positive for dysuria, frequency and urgency.        Objective:     Visit Vitals  BP (!) 140/92   Pulse 75   Temp 98.3 °F (36.8 °C) (Oral)   Resp 16   Ht 5' 7.9\" (1.725 m)   Wt 242 lb (109.8 kg)   LMP 05/09/2011 (Within Years)   SpO2 98%   BMI 36.90 kg/m²       Vitals and Nurse Documentation reviewed. Physical Exam  Constitutional:       General: She is not in acute distress. Cardiovascular:      Heart sounds: S1 normal and S2 normal. No murmur. No friction rub. No gallop. Pulmonary:      Effort: No respiratory distress. Breath sounds: Normal breath sounds. Skin:     General: Skin is warm and dry.    Psychiatric:         Mood and Affect: Mood and affect normal.

## 2019-12-16 NOTE — PROGRESS NOTES
Chief Complaint   Patient presents with    UTI    Urinary Frequency    Urinary Burning    Urinary Odor     1. Have you been to the ER, urgent care clinic since your last visit? Hospitalized since your last visit? No    2. Have you seen or consulted any other health care providers outside of the [address] [place] [address] since your last visit? Include any pap smears or colon screening.  No

## 2019-12-19 LAB — BACTERIA UR CULT: ABNORMAL

## 2020-01-03 ENCOUNTER — OFFICE VISIT (OUTPATIENT)
Dept: FAMILY MEDICINE CLINIC | Age: 60
End: 2020-01-03

## 2020-01-03 VITALS
BODY MASS INDEX: 38.14 KG/M2 | WEIGHT: 243 LBS | TEMPERATURE: 99 F | HEIGHT: 67 IN | DIASTOLIC BLOOD PRESSURE: 94 MMHG | OXYGEN SATURATION: 96 % | RESPIRATION RATE: 18 BRPM | SYSTOLIC BLOOD PRESSURE: 136 MMHG | HEART RATE: 100 BPM

## 2020-01-03 DIAGNOSIS — J45.20 MILD INTERMITTENT ASTHMA, UNSPECIFIED WHETHER COMPLICATED: ICD-10-CM

## 2020-01-03 DIAGNOSIS — J01.00 ACUTE MAXILLARY SINUSITIS, RECURRENCE NOT SPECIFIED: Primary | ICD-10-CM

## 2020-01-03 DIAGNOSIS — J32.9 RECURRENT SINUS INFECTIONS: ICD-10-CM

## 2020-01-03 RX ORDER — AMOXICILLIN AND CLAVULANATE POTASSIUM 875; 125 MG/1; MG/1
1 TABLET, FILM COATED ORAL 2 TIMES DAILY
Qty: 14 TAB | Refills: 0 | Status: SHIPPED | OUTPATIENT
Start: 2020-01-03 | End: 2020-01-10

## 2020-01-03 NOTE — PATIENT INSTRUCTIONS
Sinusitis: Care Instructions  Your Care Instructions    Sinusitis is an infection of the lining of the sinus cavities in your head. Sinusitis often follows a cold. It causes pain and pressure in your head and face. In most cases, sinusitis gets better on its own in 1 to 2 weeks. But some mild symptoms may last for several weeks. Sometimes antibiotics are needed. Follow-up care is a key part of your treatment and safety. Be sure to make and go to all appointments, and call your doctor if you are having problems. It's also a good idea to know your test results and keep a list of the medicines you take. How can you care for yourself at home? · Take an over-the-counter pain medicine, such as acetaminophen (Tylenol), ibuprofen (Advil, Motrin), or naproxen (Aleve). Read and follow all instructions on the label. · If the doctor prescribed antibiotics, take them as directed. Do not stop taking them just because you feel better. You need to take the full course of antibiotics. · Be careful when taking over-the-counter cold or flu medicines and Tylenol at the same time. Many of these medicines have acetaminophen, which is Tylenol. Read the labels to make sure that you are not taking more than the recommended dose. Too much acetaminophen (Tylenol) can be harmful. · Breathe warm, moist air from a steamy shower, a hot bath, or a sink filled with hot water. Avoid cold, dry air. Using a humidifier in your home may help. Follow the directions for cleaning the machine. · Use saline (saltwater) nasal washes to help keep your nasal passages open and wash out mucus and bacteria. You can buy saline nose drops at a grocery store or drugstore. Or you can make your own at home by adding 1 teaspoon of salt and 1 teaspoon of baking soda to 2 cups of distilled water. If you make your own, fill a bulb syringe with the solution, insert the tip into your nostril, and squeeze gently. [employer] [place] your nose.   · Put a hot, wet towel or a warm gel pack on your face 3 or 4 times a day for 5 to 10 minutes each time. · Try a decongestant nasal spray like oxymetazoline (Afrin). Do not use it for more than 3 days in a row. Using it for more than 3 days can make your congestion worse. When should you call for help? Call your doctor now or seek immediate medical care if:    · You have new or worse swelling or redness in your face or around your eyes.     · You have a new or higher fever.    Watch closely for changes in your health, and be sure to contact your doctor if:    · You have new or worse facial pain.     · The mucus from your nose becomes thicker (like pus) or has new blood in it.     · You are not getting better as expected. Where can you learn more? Go to http://radha-jihan.info/. Enter Y140 in the search box to learn more about \"Sinusitis: Care Instructions. \"  Current as of: October 21, 2018  Content Version: 12.2  © 0724-6951 [employer]. Care instructions adapted under license by [employer] (which disclaims liability or warranty for this information). If you have questions about a medical condition or this instruction, always ask your healthcare professional. [address] any warranty or liability for your use of this information.

## 2020-01-03 NOTE — PROGRESS NOTES
Chief Complaint   Patient presents with    Cold Symptoms     started over a week ago with sinus pain and pressure. pt now has cough and fever and watery eyes.  Nasal Congestion    Watery Eyes     \"REVIEWED RECORD IN PREPARATION FOR VISIT AND HAVE OBTAINED THE NECESSARY DOCUMENTATION\"  1. Have you been to the ER, urgent care clinic since your last visit? Hospitalized since your last visit? No    2. Have you seen or consulted any other health care providers outside of the [address] [place] [address] since your last visit? Include any pap smears or colon screening.  No

## 2020-01-03 NOTE — PROGRESS NOTES
[facility] Note  Subjective:      Mitchel Dennis is a 60 y.o. female who presents for an acute visit with the following chief complaints. Chief Complaint   Patient presents with    Cold Symptoms     started over a week ago with sinus pain and pressure. pt now has cough and fever and watery eyes.  Nasal Congestion    Watery Eyes     Sinus Pain  Patient complains of sinus pressure. Onset was 7-8 days ago. Started as cold symptoms. Symptoms were slowly resolving but acutely worsened 2 days ago with no relief since. Symptoms include: watery eyes, lots of nasal drainage, post nasal drainge, sinus pressure, ears are full. Cough is productive. Associated wheezing. Low grade fevers, TMAX 100.7F orally this moring, chills, feeling achy. Decrease appetite. She is drinking moderate amounts of fluids. .  Past history is significant for recurrent sinusitis, asthma, pneumonia. Patient is non-smoker. No evaluations for this illness. Treatment: Advil was helpful today. Day/Nyquil. Also taking Zyrtec, breo inhaler, flonase, nasal saline rinses, humidification. Current Outpatient Medications   Medication Sig Dispense Refill    albuterol sulfate (PROAIR RESPICLICK) 90 mcg/actuation aepb Take 1 Puff by inhalation every four (4) hours as needed for Wheezing or Cough. 2 Inhaler 0    amoxicillin-clavulanate (AUGMENTIN) 875-125 mg per tablet Take 1 Tab by mouth two (2) times a day for 7 days. Indications: Acute Bacterial Infection of the Sinuses 14 Tab 0    fluticasone furoate-vilanterol (BREO ELLIPTA) 100-25 mcg/dose inhaler Take 1 Puff by inhalation daily. 1 Inhaler 2    atorvastatin (LIPITOR) 10 mg tablet TAKE 1 TABLET BY MOUTH EVERY DAY 90 Tab 1    cholecalciferol, VITAMIN D3, (VITAMIN D3) 5,000 unit tab tablet Take 1 Tab by mouth daily.  multivitamin (ONE A DAY) tablet Take 1 Tab by mouth daily.  Includes Vitamin D 1,000 units      cetirizine (ZYRTEC) 10 mg tablet Take 10 mg by mouth daily. Indications: Non-Seasonal Allergic Runny Nose      fluticasone (FLONASE) 50 mcg/actuation nasal spray USE 2 SPRAYS IN EACH NOSTRIL AT BEDTIME (Patient taking differently: USE 2 SPRAYS IN EACH NOSTRIL AT BEDTIME AS NEEDED) 1 Bottle 11     Allergies   Allergen Reactions    Sulfa (Sulfonamide Antibiotics) Other (comments)     Causes migraines    Tramadol Itching     Past Medical History:   Diagnosis Date    Avulsion fracture of right ankle 7/10/2015    ~6-2015, [facility], boot    Baker's cyst 12/9/2013    Left knee; 12/6/13, Dr Sanaz Padilla Cervical spinal stenosis w/ right radiculopathy 7/14/2016 2016 Dr Haja Clemens, PT    Chronic depression 12/6/2012    Closed fracture of left ankle 8/26/2019 6-1-19, boot, no surgery    DDD (degenerative disc disease), cervical 7/14/2016    Endometrial cancer ([address] s/p Total Lap Hysterectomy-BSO 9-2015 9/10/2015    Grade 1 Endometrioid Carcinoma, Gyn Dr Nael Felton, Dr Vish Carrasco H/O Recurrent Asthmatic Bronchitis 12/6/2012    History of depression 12/6/2012    Hyperlipidemia 12/6/2012    Mild intermittent asthma without complication 2/3/6454    2-3 x a year triggered by peak allergy time or URI's    OA (osteoarthritis) of left knee 12/6/2012    Perennial allergic rhinitis with seasonal variation 5/9/2019    Peaks spring and fall    Pneumonia 12/6/2012    Postmenopause, LMP 46 yo, No HRT 12/6/2012    Screening exams for skin cancer 3/30/2016    Dermatologist routine checkes    UTI (urinary tract infection) 10/2017    Vitamin D deficiency 12/23/2012       ROS:   Complete review of systems was reviewed with pertinent information listed in HPI. Review of Systems   Constitutional: Positive for chills, fever and malaise/fatigue. Negative for diaphoresis and weight loss. HENT: Positive for congestion and sinus pain. Negative for ear pain, hearing loss, sore throat and tinnitus. Eyes: Negative for blurred vision.    Respiratory: Positive for cough and sputum production. Negative for shortness of breath and wheezing. Cardiovascular: Negative for chest pain and palpitations. Gastrointestinal: Negative for abdominal pain, diarrhea, heartburn, nausea and vomiting. Genitourinary: Negative for dysuria. Musculoskeletal: Negative for myalgias. Skin: Negative for rash. Neurological: Negative for dizziness and headaches. Objective:     Visit Vitals  BP (!) 136/94   Pulse 100   Temp 99 °F (37.2 °C) (Oral)   Resp 18   Ht 5' 7\" (1.702 m)   Wt 243 lb (110.2 kg)   LMP 2011 (Within Years)   SpO2 96%   BMI 38.06 kg/m²       Vitals and Nurse Documentation reviewed. Physical Exam  Vitals signs and nursing note reviewed. Constitutional:       General: She is not in acute distress. Appearance: She is well-developed. She is obese. She is not ill-appearing, toxic-appearing or diaphoretic. HENT:      Head: Normocephalic and atraumatic. Right Ear: Hearing, ear canal and external ear normal. A middle ear effusion is present. Tympanic membrane is not erythematous or bulging. Left Ear: Hearing, ear canal and external ear normal. A middle ear effusion is present. Tympanic membrane is not erythematous or bulging. Nose: Mucosal edema and congestion present. No rhinorrhea. Right Sinus: Maxillary sinus tenderness present. No frontal sinus tenderness. Left Sinus: Maxillary sinus tenderness present. No frontal sinus tenderness. Mouth/Throat:      Lips: Pink. Mouth: Mucous membranes are not pale, dry and not cyanotic. Pharynx: Oropharynx is clear. Uvula midline. No pharyngeal swelling, oropharyngeal exudate or posterior oropharyngeal erythema. Tonsils: No tonsillar exudate or tonsillar abscesses. Swellin on the right. 0 on the left. Eyes:      General: Lids are normal. Vision grossly intact. Extraocular Movements: Extraocular movements intact.       Conjunctiva/sclera:      Right eye: Right conjunctiva is injected. No chemosis, exudate or hemorrhage. Left eye: Left conjunctiva is injected. No chemosis, exudate or hemorrhage. Pupils: Pupils are equal, round, and reactive to light. Neck:      Musculoskeletal: Normal range of motion and neck supple. Thyroid: No thyroid mass. Trachea: No tracheal deviation. Cardiovascular:      Rate and Rhythm: Normal rate and regular rhythm. Heart sounds: Normal heart sounds, S1 normal and S2 normal. No murmur. No friction rub. No gallop. Pulmonary:      Effort: Pulmonary effort is normal. No respiratory distress. Breath sounds: Normal breath sounds. No decreased breath sounds, wheezing, rhonchi or rales. Comments: Last albuterol was greater than 12 hours ago  Chest:      Chest wall: No tenderness. Lymphadenopathy:      Cervical: No cervical adenopathy. Skin:     General: Skin is warm and dry. Capillary Refill: Capillary refill takes less than 2 seconds. Findings: No rash. Nails: There is no clubbing. Neurological:      Mental Status: She is alert and oriented to person, place, and time. Gait: Gait is intact. Psychiatric:         Attention and Perception: Attention normal.         Mood and Affect: Mood and affect normal.         Behavior: Behavior is cooperative. Thought Content: Thought content normal.         Cognition and Memory: Cognition normal.         Judgment: Judgment normal.       Assessment/Plan:     Diagnoses and all orders for this visit:    1. Acute maxillary sinusitis, recurrence not specified: Symptoms consistent with acute bacterial sinusitis. Agreeable to antibiotic therapy; risks and benefits reviewd. Continue symptomatic therapy; Rest, increase fluids, NSAID's PRN fever and/or discomfort. Mucinex, nasal saline rinses for nasal congestion. RTC if symptoms worsen or do not resolve. Follow-up with ENT. -     amoxicillin-clavulanate (AUGMENTIN) 875-125 mg per tablet;  Take 1 Tab by mouth two (2) times a day for 7 days. Indications: Acute Bacterial Infection of the Sinuses    2. Mild intermittent asthma, unspecified whether complicated: Mild symptoms, secondary to above illness. No respiratory distress or adventitious breath sounds on exam. Continue PRN albuterol, daily breo and allergy therapy. -     albuterol sulfate (PROAIR RESPICLICK) 90 mcg/actuation aepb; Take 1 Puff by inhalation every four (4) hours as needed for Wheezing or Cough. 3. Recurrent sinus infections: Advised follow-up with ENT and she is agreeable. -     REFERRAL TO ENT-OTOLARYNGOLOGY      Follow-up and Dispositions    · Return if symptoms worsen or fail to improve.          Discussed expected course/resolution/complications of diagnosis in detail with patient.    Medication risks/benefits/costs/interactions/alternatives discussed with patient.    Pt was given an after visit summary which includes diagnoses, current medications & vitals.  Pt expressed understanding with the diagnosis and plan

## 2020-01-09 ENCOUNTER — PATIENT MESSAGE (OUTPATIENT)
Dept: FAMILY MEDICINE CLINIC | Age: 60
End: 2020-01-09

## 2020-01-09 DIAGNOSIS — J01.00 ACUTE MAXILLARY SINUSITIS, RECURRENCE NOT SPECIFIED: ICD-10-CM

## 2020-01-09 DIAGNOSIS — J45.20 MILD INTERMITTENT ASTHMA, UNSPECIFIED WHETHER COMPLICATED: Primary | ICD-10-CM

## 2020-01-09 RX ORDER — PREDNISONE 20 MG/1
20 TABLET ORAL 2 TIMES DAILY
Qty: 8 TAB | Refills: 0 | Status: SHIPPED | OUTPATIENT
Start: 2020-01-09 | End: 2020-01-13

## 2020-01-09 NOTE — TELEPHONE ENCOUNTER
From: Harper Poon  To: Debi Hyatt NP  Sent: 1/9/2020 2:46 PM EST  Subject: Visit Follow-Up Question    Today is my last day on my Augmentin and while I do feel way better. Miquel Ortiz I still have lots of congestion in my head, and in addition to my daily Breo inhaler I am also having to use my albuterol inhaler 3-4 times a day since I saw you last Friday. You mentioned the possibility of prescribing me prednisone, is that still an option?

## 2020-02-04 DIAGNOSIS — J45.31 MILD PERSISTENT ASTHMA WITH ACUTE BRONCHITIS AND ACUTE EXACERBATION: ICD-10-CM

## 2020-02-04 DIAGNOSIS — J20.9 MILD PERSISTENT ASTHMA WITH ACUTE BRONCHITIS AND ACUTE EXACERBATION: ICD-10-CM

## 2020-02-04 RX ORDER — FLUTICASONE FUROATE AND VILANTEROL 100; 25 UG/1; UG/1
POWDER RESPIRATORY (INHALATION)
Qty: 1 INHALER | Refills: 2 | Status: SHIPPED | OUTPATIENT
Start: 2020-02-04 | End: 2020-03-31

## 2020-03-21 DIAGNOSIS — J45.20 MILD INTERMITTENT ASTHMA, UNSPECIFIED WHETHER COMPLICATED: ICD-10-CM

## 2020-03-21 DIAGNOSIS — J45.40 MODERATE PERSISTENT ASTHMA WITHOUT COMPLICATION: Primary | ICD-10-CM

## 2020-03-30 ENCOUNTER — OFFICE VISIT (OUTPATIENT)
Dept: FAMILY MEDICINE CLINIC | Age: 60
End: 2020-03-30

## 2020-03-30 DIAGNOSIS — J45.901 ASTHMA EXACERBATION, MILD: ICD-10-CM

## 2020-03-30 DIAGNOSIS — B34.9 VIRAL INFECTION: Primary | ICD-10-CM

## 2020-03-30 RX ORDER — MONTELUKAST SODIUM 10 MG/1
10 TABLET ORAL DAILY
Qty: 30 TAB | Refills: 0 | Status: SHIPPED | OUTPATIENT
Start: 2020-03-30 | End: 2020-04-21

## 2020-03-30 RX ORDER — PREDNISONE 10 MG/1
TABLET ORAL
Qty: 21 TAB | Refills: 0 | Status: SHIPPED | OUTPATIENT
Start: 2020-03-30 | End: 2020-10-08 | Stop reason: ALTCHOICE

## 2020-03-31 ENCOUNTER — APPOINTMENT (OUTPATIENT)
Dept: GENERAL RADIOLOGY | Age: 60
End: 2020-03-31
Attending: NURSE PRACTITIONER
Payer: COMMERCIAL

## 2020-03-31 ENCOUNTER — HOSPITAL ENCOUNTER (EMERGENCY)
Age: 60
Discharge: HOME | End: 2020-03-31
Attending: EMERGENCY MEDICINE
Payer: COMMERCIAL

## 2020-03-31 VITALS
DIASTOLIC BLOOD PRESSURE: 85 MMHG | HEART RATE: 119 BPM | SYSTOLIC BLOOD PRESSURE: 159 MMHG | TEMPERATURE: 99.6 F | OXYGEN SATURATION: 95 % | RESPIRATION RATE: 20 BRPM

## 2020-03-31 DIAGNOSIS — J06.9 ACUTE UPPER RESPIRATORY INFECTION: Primary | ICD-10-CM

## 2020-03-31 PROCEDURE — 99284 EMERGENCY DEPT VISIT MOD MDM: CPT

## 2020-03-31 PROCEDURE — 71045 X-RAY EXAM CHEST 1 VIEW: CPT

## 2020-03-31 NOTE — ED NOTES
Coughed during deep breath for auscultation, stated she felt like this is not her normal allergy cough

## 2020-03-31 NOTE — ED PROVIDER NOTES
Initial Complaint: Cough    Started: 3/27    Endorses: Cough, low grade fever, myalgia, nasal congestion. Seen at PCP with negative flu and strep. Started on Prednisone and  2 meds for asthma. Pulse Ox was 88% at the office yesterday. Has appointment this afternoon at [facility] for COVID testing  Denies: Nausea, vomiting, CP, icthy throat    Made better: Prednisone  Made worse: walkng around    No further complaints. Past Medical History:  7/10/2015: Avulsion fracture of right ankle      Comment:  ~6-2015, [facility], boot  12/9/2013: Baker's cyst      Comment:  Left knee; 12/6/13, Dr Ana Rome  7/14/2016: Cervical spinal stenosis w/ right radiculopathy      Comment:  2016 Dr Richard Carrillo, PT  12/6/2012: Chronic depression  8/26/2019: Closed fracture of left ankle      Comment:  6-1-19, boot, no surgery  7/14/2016: DDD (degenerative disc disease), cervical  9/10/2015: Endometrial cancer ([facility] [address] s/p Total Lap Hysterectomy-BSO 9- 2015      Comment:  Grade 1 Endometrioid Carcinoma, Gyn Dr Chad Rai, Dr Laxmi Higginbotham    12/6/2012: H/O Recurrent Asthmatic Bronchitis  12/6/2012: History of depression  12/6/2012: Hyperlipidemia  5/9/2019: Mild intermittent asthma without complication      Comment:  2-3 x a year triggered by peak allergy time or URI's  3/21/2020:  Moderate persistent asthma without complication      Comment:  3-2020  12/6/2012: OA (osteoarthritis) of left knee  5/9/2019: Perennial allergic rhinitis with seasonal variation      Comment:  Peaks spring and fall  12/6/2012: Pneumonia  12/6/2012: Postmenopause, LMP 44 yo, No HRT  3/30/2016: Screening exams for skin cancer      Comment:  Dermatologist routine checkes  10/2017: UTI (urinary tract infection)  12/23/2012: Vitamin D deficiency  Past Surgical History:  12/17/12: HX BREAST BIOPSY      Comment:  right breast, benign, calcifications; Marty Alexandra;                Fibrosis  4-, Dr Dinh Less: HX COLONOSCOPY      Comment:  Normal, follow up 10 yrs  08/2016: HX KNEE REPLACEMENT; Left      Comment:  Dr Sultana Mcleod  2014: HX SKIN BIOPSY      Comment:  [facility] removed from right upper back  9/24/2015, Dr Curt Hui: HX TOTAL LAPAROSCOPIC HYSTERECTOMY W/ BS&O      Comment:  Total Lap Hysterectomy and BSO; Endometrial cancer;                grade 1 endometrioid adenocarcinoma.; Dr. Glynn Bailey, 23 yo: HX WISDOM TEETH EXTRACTION  Reviewed      Primary care provider: Shadi Drake MD    The history is provided by the patient. No  was used.         Past Medical History:   Diagnosis Date    Avulsion fracture of right ankle 7/10/2015    ~6-2015, [facility], boot    Baker's cyst 12/9/2013    Left knee; 12/6/13, Dr Ivory Chin Cervical spinal stenosis w/ right radiculopathy 7/14/2016 2016 Dr Sanchez Wilkins, PT    Chronic depression 12/6/2012    Closed fracture of left ankle 8/26/2019 6-1-19, boot, no surgery    DDD (degenerative disc disease), cervical 7/14/2016    Endometrial cancer ([facility] [address] s/p Total Lap Hysterectomy-BSO 9-2015 9/10/2015    Grade 1 Endometrioid Carcinoma, Gyn Dr Mahesh Mata, Dr Asuncion Flannery H/O Recurrent Asthmatic Bronchitis 12/6/2012    History of depression 12/6/2012    Hyperlipidemia 12/6/2012    Mild intermittent asthma without complication 6/4/3376    2-3 x a year triggered by peak allergy time or URI's    Moderate persistent asthma without complication 0/47/8627    3-2020    OA (osteoarthritis) of left knee 12/6/2012    Perennial allergic rhinitis with seasonal variation 5/9/2019    Peaks spring and fall    Pneumonia 12/6/2012    Postmenopause, LMP 46 yo, No HRT 12/6/2012    Screening exams for skin cancer 3/30/2016    Dermatologist routine checkes    UTI (urinary tract infection) 10/2017    Vitamin D deficiency 12/23/2012       Past Surgical History:   Procedure Laterality Date    HX BREAST BIOPSY  12/17/12    right breast, benign, calcifications; Josefina Artist; Fibrosis    HX COLONOSCOPY  2015, Dr Batool Cabrales    Normal, follow up 10 yrs    HX KNEE REPLACEMENT Left 2016    Dr Wero Trevino SKIN BIOPSY      [facility] removed from right upper back    HX TOTAL LAPAROSCOPIC HYSTERECTOMY W/ BS&O  2015, Dr Dae Helton    Total Lap Hysterectomy and BSO; Endometrial cancer; grade 1 endometrioid adenocarcinoma.; Dr. Cathleen Oshea, 23 yo         Family History:   Problem Relation Age of Onset    Kidney Disease Mother         kidney transplant age 75yo ;  renal failure age 80    Diabetes Mother 36    High Cholesterol Mother     Arthritis-osteo Mother         B knee replacements in her 63's    Heart Disease Father     Diabetes Father     Dementia Father         Alzheimer's;  after fall and hip fx    Heart Surgery Father         CABG in his 63's   [de-identified] No Known Problems Son     Allergic Rhinitis Daughter     No Known Problems Daughter     Hypertension Brother     Hypertension Brother     Diabetes Brother         mild, controlling w/ diet    Diabetes Maternal Grandfather     Heart Attack Maternal Grandfather 62         MI        Social History     Socioeconomic History    Marital status:      Spouse name: Not on file    Number of children: 3    Years of education: Not on file    Highest education level: Not on file   Occupational History    Occupation:  at [employer]   Social Needs    Financial resource strain: Not on file    Food insecurity     Worry: Not on file     Inability: Not on file   [employer] needs     Medical: Not on file     Non-medical: Not on file   Tobacco Use    Smoking status: Former Smoker     Last attempt to quit: 1985     Years since quittin.2    Smokeless tobacco: Never Used    Tobacco comment: used to be a light smoker x 3 yrs   Substance and Sexual Activity    Alcohol use:  Yes     Alcohol/week: 0.0 standard drinks     Comment: rare, maybe 1-2 glasses of wine a month at most    Drug use: No    Sexual activity: Yes     Partners: Male     Comment: Postmenopause   Lifestyle    Physical activity     Days per week: Not on file     Minutes per session: Not on file    Stress: Not on file   Relationships    Social connections     Talks on phone: Not on file     Gets together: Not on file     Attends [employer] service: Not on file     Active member of club or organization: Not on file     Attends meetings of clubs or organizations: Not on file     Relationship status: Not on file    Intimate partner violence     Fear of current or ex partner: Not on file     Emotionally abused: Not on file     Physically abused: Not on file     Forced sexual activity: Not on file   Other Topics Concern     Service Not Asked    Blood Transfusions Not Asked    Caffeine Concern No     Comment: 1 cup of coffee  qd    Occupational Exposure Not Asked   Lakeshia Gong Hazards Not Asked    Sleep Concern Not Asked    Stress Concern Not Asked    Weight Concern Yes     Comment: wants to lose wt    Special Diet No     Comment: not eating as healthy since 7-2018    Back Care Not Asked    Exercise No     Comment: none x several months    Bike Helmet Not Asked    Seat Belt Not Asked    Self-Exams Not Asked   Social History Narrative    Not on file     ALLERGIES: Sulfa (sulfonamide antibiotics) and Tramadol    Review of Systems   Constitutional: Positive for fever. Negative for activity change, appetite change and chills. HENT: Negative for sneezing and sore throat. Respiratory: Positive for cough and shortness of breath. Cardiovascular: Negative for chest pain. Gastrointestinal: Negative for nausea and vomiting. Musculoskeletal: Negative for myalgias. Psychiatric/Behavioral: Negative. All other systems reviewed and are negative.     Vitals:    03/31/20 1247   BP: (!) 116/102   Pulse: (!) 119   Resp: 20   Temp: 99.6 °F (37.6 °C)   SpO2: 95%          Physical Exam  Vitals signs and nursing note reviewed. Exam conducted with a chaperone present. Constitutional:       General: She is not in acute distress. Appearance: Normal appearance. She is not ill-appearing, toxic-appearing or diaphoretic. HENT:      Head: Normocephalic and atraumatic. Mouth/Throat:      Lips: Pink. Mouth: Mucous membranes are moist.      Pharynx: Oropharynx is clear. Uvula midline. Posterior oropharyngeal erythema present. No pharyngeal swelling, oropharyngeal exudate or uvula swelling. Tonsils: No tonsillar exudate or tonsillar abscesses. Neck:      Musculoskeletal: Normal range of motion. Cardiovascular:      Rate and Rhythm: Tachycardia present. Pulses: Normal pulses. Pulmonary:      Effort: Pulmonary effort is normal. No respiratory distress. Breath sounds: No decreased breath sounds, wheezing, rhonchi or rales. Comments: Speaking in full sentences. Course breath soubds  Lymphadenopathy:      Head:      Right side of head: No submental, submandibular, tonsillar, preauricular or posterior auricular adenopathy. Left side of head: No submental, submandibular, tonsillar, preauricular or posterior auricular adenopathy. Cervical: No cervical adenopathy. Neurological:      Mental Status: She is alert. Psychiatric:         Attention and Perception: Attention and perception normal.         Mood and Affect: Mood and affect normal.         Speech: Speech normal.         Behavior: Behavior normal. Behavior is cooperative. Thought Content: Thought content normal.         Cognition and Memory: Cognition and memory normal.         Judgment: Judgment normal.        MDM       Procedures      Assessment & Plan:     Orders Placed This Encounter    XR CHEST PORT       Assigned attending physician:  Michaela Horan MD,ED Provider    Hilda Wilkerson NP  03/31/20  1:03 PM      Chest x-ray without acute findings.   Continue plans to get coronavirus testing later today. Continue medications prescribed by PCP. Discussed return precautions. 1:26 PM  Patient re-evaluated. All questions answered. Patient appropriate for discharge. Given return precautions and follow up instructions. LABORATORY TESTS:  Labs Reviewed - No data to display    IMAGING RESULTS:  XR CHEST PORT   Final Result   IMPRESSION:   No acute process. MEDICATIONS GIVEN:  Medications - No data to display    IMPRESSION:  1. Acute upper respiratory infection        PLAN:  1. Current Discharge Medication List        2. Follow-up Information     Follow up With Specialties Details Why Contact Info    Bharath Lopez MD [facility] Schedule an appointment as soon as possible for a visit  [facility] [phone]      [facility] [address] [facility] Emergency Medicine  As needed, If symptoms worsen [address] [facility]  [phone]        3. Return to ED for new or worsening symptoms       Leslye Bray NP        Please note that this dictation was completed with [employer], the computer voice recognition software. Quite often unanticipated grammatical, syntax, homophones, and other interpretive errors are inadvertently transcribed by the computer software. Please disregard these errors. Please excuse any errors that have escaped final proofreading. I was personally available for consultation in the emergency department. I have reviewed the chart and agree with the documentation recorded by the [facility], including the assessment, treatment plan, and disposition.   Rizwan Laughlin MD

## 2020-03-31 NOTE — ED NOTES
Released with instructions. The Patient waseducated on Standard precautions, avoid crowds and persons with known infections and staying current with immunizations. The Family member were given time and space to ask questions.

## 2020-03-31 NOTE — DISCHARGE INSTRUCTIONS
Thank you for allowing us to care for you today. Please follow-up with your Primary Care provider in the next 2-3 days if your symptoms do not improve. Plan for home:     Continue prednisone as directed by primary care provider. Tylenol 1000 mg alternating with Ibuprofen 600mg every 6 hours for pain/fever control. Do NOT take ibuprofen if you are on a blood thinner! Example: 8am take Ibuprofen. 11am take tylenol. 2pm take ibuprofen. 5pm take tylenol. 8pm take ibuprofen. 11pm take tylenol. You may continue this process overnight if you have continued pain/discomfort. CONSIDER YOURSELF COVID-19 POSITIVE! If you would like testing, go online at [facility] for screening and appointment set up. Stay at home except to get medical care. Seek medical attention if you develop worsening symptoms or new concerns such as severe shortness of breath, chest pain, etc.    Separate yourself from other people and animals in your home. If possible, stay in a separate room and use a separate bathroom from others in your house. Restrict contact with pets, as there is a possibility of transmission of viruses. Avoid intimate contact with others. This is included hand holding, kissing, hugging as well as any sexual contact. Wear a facemask when you are around other people. Cover your mouth when you cough or sneeze. Wash your hands often with warm soapy water for at least 20 seconds. If soap and water are not available, use an alcohol based hand . Clean all high touch surfaces everyday. For example: counters, tabletops, doorknobs, bathroom fixtures, toilets, phones, keyboards, tablets, and bedside tables. Monitor your symptoms at home. Seek prompt medical attention if you symptoms worsen. (i.e. difficulty breathing). Don't forget to change your toothbrush and change your bed linen frequently!     Come back to the ER if you have worsening symptoms, difficulty breathing or speaking in full sentences, fevers over 100.9 that do not improved with tylenol or Ibuprofen, shaking chills, nausea or vomiting. Patient Education        Upper Respiratory Infection (Cold): Care Instructions  Your Care Instructions    An upper respiratory infection, or URI, is an infection of the nose, sinuses, or throat. URIs are spread by coughs, sneezes, and direct contact. The common cold is the most frequent kind of URI. The flu and sinus infections are other kinds of URIs. Almost all URIs are caused by viruses. Antibiotics won't cure them. But you can treat most infections with home care. This may include drinking lots of fluids and taking over-the-counter pain medicine. You will probably feel better in 4 to 10 days. The doctor has checked you carefully, but problems can develop later. If you notice any problems or new symptoms, get medical treatment right away. Follow-up care is a key part of your treatment and safety. Be sure to make and go to all appointments, and call your doctor if you are having problems. It's also a good idea to know your test results and keep a list of the medicines you take. How can you care for yourself at home? · To prevent dehydration, drink plenty of fluids, enough so that your urine is light yellow or clear like water. Choose water and other caffeine-free clear liquids until you feel better. If you have kidney, heart, or liver disease and have to limit fluids, talk with your doctor before you increase the amount of fluids you drink. · Take an over-the-counter pain medicine, such as acetaminophen (Tylenol), ibuprofen (Advil, Motrin), or naproxen (Aleve). Read and follow all instructions on the label. · Before you use cough and cold medicines, check the label. These medicines may not be safe for young children or for people with certain health problems. · Be careful when taking over-the-counter cold or flu medicines and Tylenol at the same time.  Many of these medicines have acetaminophen, which is Tylenol. Read the labels to make sure that you are not taking more than the recommended dose. Too much acetaminophen (Tylenol) can be harmful. · Get plenty of rest.  · Do not smoke or allow others to smoke around you. If you need help quitting, talk to your doctor about stop-smoking programs and medicines. These can increase your chances of quitting for good. When should you call for help? Call 911 anytime you think you may need emergency care. For example, call if:    · You have severe trouble breathing.    Call your doctor now or seek immediate medical care if:    · You seem to be getting much sicker.     · You have new or worse trouble breathing.     · You have a new or higher fever.     · You have a new rash.    Watch closely for changes in your health, and be sure to contact your doctor if:    · You have a new symptom, such as a sore throat, an earache, or sinus pain.     · You cough more deeply or more often, especially if you notice more mucus or a change in the color of your mucus.     · You do not get better as expected. Where can you learn more? Go to http://radha-jihan.info/  Enter K520 in the search box to learn more about \"Upper Respiratory Infection (Cold): Care Instructions. \"  Current as of: June 9, 2019Content Version: 12.4  © 9702-6315 Healthwise, Incorporated. Care instructions adapted under license by [employer] (which disclaims liability or warranty for this information). If you have questions about a medical condition or this instruction, always ask your healthcare professional. [place] [address] any warranty or liability for your use of this information.

## 2020-03-31 NOTE — ED TRIAGE NOTES
Pt comes in for cough and SOB since friday, pt denies sick contacts or recent travel. Sates she does have allergies but doesn't feel like normal allergies.

## 2020-04-01 ENCOUNTER — PATIENT OUTREACH (OUTPATIENT)
Dept: INTERNAL MEDICINE CLINIC | Age: 60
End: 2020-04-01

## 2020-04-01 ENCOUNTER — TELEPHONE (OUTPATIENT)
Dept: FAMILY MEDICINE CLINIC | Age: 60
End: 2020-04-01

## 2020-04-01 ENCOUNTER — TELEPHONE (OUTPATIENT)
Dept: PHARMACY | Age: 60
End: 2020-04-01

## 2020-04-01 PROBLEM — Z85.42 HISTORY OF ENDOMETRIAL CANCER: Status: ACTIVE | Noted: 2020-04-01

## 2020-04-01 LAB
FLUAV+FLUBV AG NOSE QL IA.RAPID: NEGATIVE POS/NEG
FLUAV+FLUBV AG NOSE QL IA.RAPID: NEGATIVE POS/NEG
S PYO AG THROAT QL: NEGATIVE
VALID INTERNAL CONTROL?: YES
VALID INTERNAL CONTROL?: YES

## 2020-04-01 NOTE — TELEPHONE ENCOUNTER
CLINICAL PHARMACY NOTE  Referral received as below    Attempting to reach patient; left message asking for return call.     Taco HollandD, [facility]  Direct: [phone]  Department, toll free: [phone], option 7

## 2020-04-01 NOTE — PROGRESS NOTES
COVID-19 Screening Initial Follow-up Note    Patient contacted regarding COVID-19  risk. Care Transition Nurse/ Ambulatory Care Manager contacted the patient by telephone to perform post discharge assessment. Verified name and  with patient as identifiers. Provided introduction to self, and explanation of the CTN/ACM role, and reason for call due to risk factors for infection and/or exposure to COVID-19. Symptoms reviewed with patient who verbalized the following symptoms: no new/worsening symptoms Patient reports feeling better today, a COVID-19 test was performed at [facility] and she is awaiting results of the test, which she was told should be available in a day or two. Due to no new or worsening symptoms encounter was not routed to provider for escalation. Patient has following risk factors of: asthma. CTN/ACM reviewed discharge instructions, medical action plan and red flags such as increased shortness of breath, increasing fever and signs of decompensation with patient who verbalized understanding. Discussed exposure protocols and quarantine with CDC Guidelines What to do if you are sick with coronavirus disease 2019 Patient who was given an opportunity for questions and concerns. The patient agrees to contact the [facility] exposure line [phone], [facility] [address]  ([phone] and PCP office for questions related to their healthcare. CTN/ACM provided contact information for future reference. Reviewed and educated patient on any new and changed medications related to discharge diagnosis. Patient agreed to referral to [email or web address] for questions regarding inhalers, OTC medications for viral symptoms and ED discharge med rec. A referral was sent by writer to previously listed Pharmacy email.      Plan for follow-up call in 14 days based on severity of symptoms and risk factors    Jennyfer Parks RN  Ambulatory Care Manager

## 2020-04-01 NOTE — TELEPHONE ENCOUNTER
----- Message from Fior Muñiz sent at 4/1/2020  2:47 PM EDT -----  Regarding: Dr. Nazia Qiu General Message/Vendor Calls    Caller's first and last name: Yuki Cha      Reason for call: Regarding her test results as well as the Rx that she was prescribed.         Call back required yes/no and why: Yes       Best contact number(s): [phone]      Details to clarify the request:      Fior Muñiz

## 2020-04-01 NOTE — PROGRESS NOTES
Patient was seen at [facility] flu clinic. Please seen scanned form for visit documentation.

## 2020-04-01 NOTE — TELEPHONE ENCOUNTER
Referral per [employer]:    Patient was newly diagnosed with asthma in January. Has questions related to inhaler choices and costs. Also has questions regarding OTC meds for viral symptoms. Needs ED discharge med rec   Thank you so much for what you are doing!       Will Young RN-BC Ambulatory Care Manager  [address] [place] [address] [place]  [phone]  F: [phone]  [email or web address]

## 2020-04-02 NOTE — TELEPHONE ENCOUNTER
Outbound call to patient. Name and  verified. Patient stated that she doesn't know what to do. She has come to the flu clinic and was prescribed prednisone which she stopped taking before completing course. Patient stated that she has no fever, she has a cough, SOB. Patient stated that she is taking her albuterol q 6 hours as well as the Singulair. States she stopped the prednisone because her friend that is a nurse told her that it does not work well with patients who have Covid 19. Patient has been to flu clinic, ED and went to [facility] for Covid 19 testing which is pending. Patient stated that she does not want to go to the ED because that is for really sick people but she does not know what she should do. Advised patient that if she is having difficulty breathing she should get checked out however if she is not having difficulty breathing she should rest, drink fluids and await pending results. Patient went on stating that her  had the same sx and was given a zpack and felt better as he was dx with sinus infection. Patient did not want to complete her course of prednisone dose pack. Reiterated previous recommendations to patient. She expressed understanding.

## 2020-04-03 NOTE — TELEPHONE ENCOUNTER
Attempting again to reach patient.  Left another message and will send Mychart.     =========================================================   For Pharmacy Admin Tracking Only    PHSO: PHSO Patient?: Yes  Recommended intervention potential cost savings: 0  Time Spent (min): 5

## 2020-04-15 ENCOUNTER — PATIENT OUTREACH (OUTPATIENT)
Dept: INTERNAL MEDICINE CLINIC | Age: 60
End: 2020-04-15

## 2020-04-15 NOTE — PROGRESS NOTES
Patient resolved from Transition of Care episode on 4/15/20  Patient/family has been provided the following resources and education related to COVID-19:                         Signs, symptoms and red flags related to COVID-19            CDC exposure and quarantine guidelines            [facility] exposure contact - [phone]            Contact for their local Department of Health                 Patient currently reports that the following symptoms have improved:  no new/worsening symptoms  COVID-19 test resulted negative. Patient went to [facility] for continued symptoms, was diagnosed with bronchitis and sinus infection and started on doxycycline. Patient states she stopped prednisone prescribed from ED, on her own. Patient stated she is now feeling much better and back to her old self. Patient stated she did not know who to contact with questions about the prednisone since it was prescribed in ED. ACM encouraged patient to contact PCP in the future regarding medication questions    No further outreach scheduled with this ACM. Episode of Care resolved. Patient has this CTN/ACM contact information if future needs arise.   Fabricio Hopson RN  Ambulatory Care Manager

## 2020-06-23 ENCOUNTER — PATIENT MESSAGE (OUTPATIENT)
Dept: FAMILY MEDICINE CLINIC | Age: 60
End: 2020-06-23

## 2020-06-26 NOTE — TELEPHONE ENCOUNTER
Pt is following up on the letter. .. she is scheduled to go back to work on Monday - so she needs to provide the employer with her letter by end of today. Please call her with an update.     BCB: [phone]

## 2020-06-26 NOTE — TELEPHONE ENCOUNTER
I did a letter and routed it to you. You can let pt know it is complete and decide on whether she wants you to fax it or leave it up front for her to  before close of business today.

## 2020-06-26 NOTE — TELEPHONE ENCOUNTER
----- Message from [place], LPN sent at 4/33/0006  3:13 PM EDT -----  Regarding: FW: Non-Urgent Medical Question  Contact: [phone]    ----- Message -----  From: Will Garcia  Sent: 6/25/2020  12:35 PM EDT  To: [facility] Nurse Pool  Subject: RE: Non-Urgent Medical Question                  Hello again, I've just gotten a response from my supervisor that I'll need a letter from you, I guess indicating I'm high risk and should find an alternative to going back to work in [employer] during this time. What is the best way for me to get this?  Thanks, Antione Brannon

## 2020-07-30 DIAGNOSIS — E78.2 MIXED HYPERLIPIDEMIA: Primary | ICD-10-CM

## 2020-07-30 NOTE — TELEPHONE ENCOUNTER
Last Visit: 3/30/30 flu clinic NP [address] [facility]  Next Appointment: Not scheduled  Previous Refill Encounter(s): 8/29/19  90 + 1    Requested Prescriptions     Pending Prescriptions Disp Refills    atorvastatin (LIPITOR) 10 mg tablet 90 Tab 1     Sig: TAKE 1 TABLET BY MOUTH EVERY DAY

## 2020-07-31 RX ORDER — ATORVASTATIN CALCIUM 10 MG/1
TABLET, FILM COATED ORAL
Qty: 90 TAB | Refills: 0 | Status: SHIPPED | OUTPATIENT
Start: 2020-07-31 | End: 2021-01-07

## 2020-08-13 DIAGNOSIS — J45.40 MODERATE PERSISTENT ASTHMA WITHOUT COMPLICATION: ICD-10-CM

## 2020-08-13 DIAGNOSIS — J30.2 PERENNIAL ALLERGIC RHINITIS WITH SEASONAL VARIATION: Primary | ICD-10-CM

## 2020-08-13 DIAGNOSIS — J30.89 PERENNIAL ALLERGIC RHINITIS WITH SEASONAL VARIATION: Primary | ICD-10-CM

## 2020-08-13 RX ORDER — MONTELUKAST SODIUM 10 MG/1
TABLET ORAL
Qty: 90 TAB | Refills: 3 | Status: SHIPPED | OUTPATIENT
Start: 2020-08-13 | End: 2021-08-23

## 2020-08-18 ENCOUNTER — TELEPHONE (OUTPATIENT)
Dept: FAMILY MEDICINE CLINIC | Age: 60
End: 2020-08-18

## 2020-08-18 NOTE — TELEPHONE ENCOUNTER
----- Message from Tobi Mailman sent at 8/17/2020 11:10 AM EDT -----  Regarding: Dr. Lyndsay Arce: [phone]  Caller's first and last name: Pt  Reason for call: Lab work  Callback required yes/no and why: yes  Best contact number(s): [phone]  Details to clarify the request: Would like lab work for cholesterol check completed earlier in week due to a in office appt with the doctor.

## 2020-09-12 PROBLEM — R73.03 PREDIABETES: Status: ACTIVE | Noted: 2020-09-12

## 2020-09-15 ENCOUNTER — TELEPHONE (OUTPATIENT)
Dept: FAMILY MEDICINE CLINIC | Age: 60
End: 2020-09-15

## 2020-09-15 NOTE — TELEPHONE ENCOUNTER
----- Message from [place] sent at 9/14/2020  4:50 PM EDT -----  Regarding: Dr. Shyam Rivas first and last name: pt  Reason for call: Lab work  Callback required yes/no and why: yes  Best contact number(s):  [phone]  Details to clarify the request: Please mail order for labwork to mailing address.

## 2020-09-23 LAB
25(OH)D3+25(OH)D2 SERPL-MCNC: 56.3 NG/ML (ref 30–100)
ALBUMIN SERPL-MCNC: 4.8 G/DL (ref 3.8–4.8)
ALBUMIN/GLOB SERPL: 2.1 {RATIO} (ref 1.2–2.2)
ALP SERPL-CCNC: 92 IU/L (ref 39–117)
ALT SERPL-CCNC: 44 IU/L (ref 0–32)
AST SERPL-CCNC: 36 IU/L (ref 0–40)
BILIRUB SERPL-MCNC: 0.4 MG/DL (ref 0–1.2)
BUN SERPL-MCNC: 15 MG/DL (ref 8–27)
BUN/CREAT SERPL: 19 (ref 12–28)
CALCIUM SERPL-MCNC: 9.6 MG/DL (ref 8.7–10.3)
CHLORIDE SERPL-SCNC: 103 MMOL/L (ref 96–106)
CHOLEST SERPL-MCNC: 150 MG/DL (ref 100–199)
CO2 SERPL-SCNC: 21 MMOL/L (ref 20–29)
CREAT SERPL-MCNC: 0.79 MG/DL (ref 0.57–1)
ERYTHROCYTE [DISTWIDTH] IN BLOOD BY AUTOMATED COUNT: 12.3 % (ref 11.7–15.4)
EST. AVERAGE GLUCOSE BLD GHB EST-MCNC: 123 MG/DL
GLOBULIN SER CALC-MCNC: 2.3 G/DL (ref 1.5–4.5)
GLUCOSE SERPL-MCNC: 123 MG/DL (ref 65–99)
HBA1C MFR BLD: 5.9 % (ref 4.8–5.6)
HCT VFR BLD AUTO: 42.9 % (ref 34–46.6)
HDLC SERPL-MCNC: 38 MG/DL
HGB BLD-MCNC: 14.5 G/DL (ref 11.1–15.9)
INTERPRETATION, 910389: NORMAL
LDLC SERPL CALC-MCNC: 90 MG/DL (ref 0–99)
MCH RBC QN AUTO: 29.5 PG (ref 26.6–33)
MCHC RBC AUTO-ENTMCNC: 33.8 G/DL (ref 31.5–35.7)
MCV RBC AUTO: 87 FL (ref 79–97)
PLATELET # BLD AUTO: 335 X10E3/UL (ref 150–450)
POTASSIUM SERPL-SCNC: 4.6 MMOL/L (ref 3.5–5.2)
PROT SERPL-MCNC: 7.1 G/DL (ref 6–8.5)
RBC # BLD AUTO: 4.92 X10E6/UL (ref 3.77–5.28)
SODIUM SERPL-SCNC: 142 MMOL/L (ref 134–144)
TRIGL SERPL-MCNC: 121 MG/DL (ref 0–149)
TSH SERPL DL<=0.005 MIU/L-ACNC: 1.91 UIU/ML (ref 0.45–4.5)
VLDLC SERPL CALC-MCNC: 22 MG/DL (ref 5–40)
WBC # BLD AUTO: 5.3 X10E3/UL (ref 3.4–10.8)

## 2020-10-08 ENCOUNTER — OFFICE VISIT (OUTPATIENT)
Dept: FAMILY MEDICINE CLINIC | Age: 60
End: 2020-10-08
Payer: COMMERCIAL

## 2020-10-08 VITALS
TEMPERATURE: 97.6 F | OXYGEN SATURATION: 98 % | HEIGHT: 67 IN | SYSTOLIC BLOOD PRESSURE: 120 MMHG | BODY MASS INDEX: 36.13 KG/M2 | DIASTOLIC BLOOD PRESSURE: 79 MMHG | RESPIRATION RATE: 20 BRPM | WEIGHT: 230.2 LBS | HEART RATE: 80 BPM

## 2020-10-08 DIAGNOSIS — E78.2 MIXED HYPERLIPIDEMIA: ICD-10-CM

## 2020-10-08 DIAGNOSIS — Z00.00 ROUTINE GENERAL MEDICAL EXAMINATION AT A HEALTH CARE FACILITY: Primary | ICD-10-CM

## 2020-10-08 DIAGNOSIS — Z23 NEEDS FLU SHOT: ICD-10-CM

## 2020-10-08 DIAGNOSIS — R73.03 PREDIABETES: ICD-10-CM

## 2020-10-08 PROCEDURE — 90471 IMMUNIZATION ADMIN: CPT | Performed by: FAMILY MEDICINE

## 2020-10-08 PROCEDURE — 90686 IIV4 VACC NO PRSV 0.5 ML IM: CPT | Performed by: FAMILY MEDICINE

## 2020-10-08 PROCEDURE — 99396 PREV VISIT EST AGE 40-64: CPT | Performed by: FAMILY MEDICINE

## 2020-10-08 RX ORDER — CEFUROXIME AXETIL 250 MG/1
TABLET ORAL
COMMUNITY
Start: 2020-09-14 | End: 2020-10-08 | Stop reason: ALTCHOICE

## 2020-10-08 RX ORDER — NITROFURANTOIN 25; 75 MG/1; MG/1
CAPSULE ORAL
COMMUNITY
Start: 2020-10-03 | End: 2022-01-26 | Stop reason: ALTCHOICE

## 2020-10-08 NOTE — PROGRESS NOTES
Chief Complaint   Patient presents with    Complete Physical       1. Have you been to the ER, urgent care clinic since your last visit? Hospitalized since your last visit? Yes When: 9/14/20 Where: [facility] Reason for visit: UTI    2. Have you seen or consulted any other health care providers outside of the [address] [place] [address] since your last visit? Include any pap smears or colon screening. No    3 most recent PHQ Screens 10/8/2020   Little interest or pleasure in doing things Not at all   Feeling down, depressed, irritable, or hopeless Not at all   Total Score PHQ 2 0       Abuse Screening Questionnaire 10/8/2020   Do you ever feel afraid of your partner? -   Are you in a relationship with someone who physically or mentally threatens you? N   Is it safe for you to go home?  Sylvia Hoffman

## 2020-10-09 NOTE — PROGRESS NOTES
Chief Complaint   Patient presents with    Complete Physical     fasting labs done 9/22/20       HISTORY OF PRESENT ILLNESS   HPI  Annual CPE   Follow up hypercholesterolemia, prediabetes, labs and medication check  The past 6 weeks has been following healthier eating habits  Cut out processed foods, reduced sodium, more portion control  Was swimming a lot over the summer for cardio exercise but had to stop once gyms closed due to Covid pandemic  Wt is down ~ 13 lbs from last year and she is surprised but encouraged! Has been feeling pretty well in general  Some occasional stiffness in hands  Has nodules at distal finger tips but no significant swelling or pain. No erythema. Nothing she has to take medications for routinely       REVIEW OF SYMPTOMS   Review of Systems   Constitutional: Negative. HENT: Negative. Eyes: Negative. Respiratory: Negative. Cardiovascular: Negative. Gastrointestinal: Negative. Genitourinary:        She was treated for 3 UTI's about 1 1/2 yrs ago and treated at [facility] for 2 UTI's in the past year, most recently last week. Last dose of abx yesterday and doing much better   Musculoskeletal: Negative for myalgias. Neurological: Negative. Endo/Heme/Allergies: Negative. Psychiatric/Behavioral: Negative.             PROBLEM LIST/MEDICAL HISTORY     Problem List  Date Reviewed: 10/8/2020          Codes Class Noted    Prediabetes ICD-10-CM: R73.03  ICD-9-CM: 790.29  9/12/2020        History of endometrial cancer ICD-10-CM: Z85.42  ICD-9-CM: V10.42  4/1/2020        Moderate persistent asthma without complication MAHIN-02-GY: A57.64  ICD-9-CM: 493.90  3/21/2020    Overview Signed 3/21/2020  6:55 PM by Morgan Nagy MD     3-2020             Severe obesity ([facility] [address] ICD-10-CM: E66.01  ICD-9-CM: 278.01  10/1/2019        Closed fracture of left ankle ICD-10-CM: N53.492B  ICD-9-CM: 824.8  8/26/2019    Overview Addendum 8/26/2019  8:35 AM by Morgan Nagy MD Dr. Barb Bronson 6-1-19, boot, no surgery             Mixed hyperlipidemia ICD-10-CM: E78.2  ICD-9-CM: 272.2  8/26/2019        Mild intermittent asthma without complication [facility]-ST: G05.45  ICD-9-CM: 493.90  5/9/2019    Overview Signed 5/9/2019 12:22 PM by Cherelle Dash MD     2-3 x a year triggered by peak allergy time or URI's             Perennial allergic rhinitis with seasonal variation ICD-10-CM: J30.89, J30.2  ICD-9-CM: 477.9  5/9/2019    Overview Signed 5/9/2019 12:27 PM by Cherelle Dash MD     Peaks spring and fall             Gross hematuria ICD-10-CM: R31.0  ICD-9-CM: 599.71  11/30/2017        DDD (degenerative disc disease), cervical ICD-10-CM: M50.30  ICD-9-CM: 722.4  7/14/2016        Cervical spinal stenosis w/ right radiculopathy ICD-10-CM: M48.02  ICD-9-CM: 723.0  7/14/2016    Overview Signed 7/14/2016  8:43 AM by Cherelle Dash MD     2016 Dr Gemma Patricio, PT             Screening exams for skin cancer ICD-10-CM: Z12.83  ICD-9-CM: V76.43  3/30/2016    Overview Signed 3/30/2016 11:22 AM by Cherelle Dash MD     Dermatologist routine checkes             Avulsion fracture of right ankle ICD-10-CM: S82.899A  ICD-9-CM: 824.8  7/10/2015    Overview Signed 7/10/2015  9:02 AM by Cherelle Dash MD     ~6-2015, Isabella Pelaez, boot             Baker's cyst ICD-10-CM: M71.20  ICD-9-CM: 727.51  12/9/2013    Overview Signed 12/9/2013 11:58 AM by Cherelle Dash MD     Left knee; 12/6/13, Dr Jenny Hernandez             Vitamin D deficiency ICD-10-CM: E55.9  ICD-9-CM: 268.9  12/23/2012        Seasonal allergic rhinitis ICD-10-CM: J30.2  ICD-9-CM: 477.9  12/6/2012    Overview Signed 12/6/2012  2:27 PM by Cherelle Dash MD     Spring and Fall worst             Pneumonia ICD-10-CM: J18.9  ICD-9-CM: 527  12/6/2012    Overview Signed 12/6/2012  2:28 PM by Cherelle Dash MD     Spring 2012, treated outpt, [facility]             History of depression ICD-10-CM: Z86.59  ICD-9-CM: V11.8  12/6/2012    Overview Addendum 6/26/2013  2:16 PM by Kenneth Redd MD     Treated sporadically over the years w/ Effexor or Celexa (better)             Chronic depression ICD-10-CM: F32.9  ICD-9-CM: 632  12/6/2012        H/O Recurrent Asthmatic Bronchitis ICD-10-CM: J45.909  ICD-9-CM: 493.90  12/6/2012    Overview Signed 12/6/2012  2:38 PM by Kenneth Redd MD     Since ~ 2006             OA (osteoarthritis) of left knee ICD-10-CM: M17.10  ICD-9-CM: 715.36  12/6/2012    Overview Addendum 3/25/2015 12:00 PM by Kenneth Redd MD     Xrays 2011; Ortho Dr Sepideh Holloway, 12/2013, xrayed again, advanced DJD/OA ; CSI 12/2013, 3/2014, 6/2014, 12/2014                       PAST SURGICAL HISTORY     Past Surgical History:   Procedure Laterality Date    HX BREAST BIOPSY  12/17/12    right breast, benign, calcifications; [facility] U[facility]; Fibrosis    HX COLONOSCOPY  04/14/2015    Normal, follow up 10 yrs, Dr Liliana Plaza Left 08/2016    Dr Heather Canchola  2014    [facility] [employer] [facility] from right upper back    HX TOTAL LAPAROSCOPIC HYSTERECTOMY W/ BS&O  9/24/2015, Dr Yamileth Guerrero    Total Lap Hysterectomy and BSO; Endometrial cancer; grade 1 endometrioid adenocarcinoma.; Dr. Jason Jeffries, 25 yo         MEDICATIONS     Current Outpatient Medications   Medication Sig    nitrofurantoin, macrocrystal-monohydrate, (MACROBID) 100 mg capsule     montelukast (SINGULAIR) 10 mg tablet TAKE 1 TABLET BY MOUTH EVERY DAY    atorvastatin (LIPITOR) 10 mg tablet TAKE 1 TABLET BY MOUTH EVERY DAY    albuterol sulfate (ProAir RespiClick) 90 mcg/actuation aepb Take 2 Puffs by inhalation every six (6) hours as needed for Wheezing or Cough.  cholecalciferol, VITAMIN D3, (VITAMIN D3) 5,000 unit tab tablet Take 1 Tab by mouth daily.  multivitamin (ONE A DAY) tablet Take 1 Tab by mouth daily.  Includes Vitamin D 1,000 units    cetirizine (ZYRTEC) 10 mg tablet Take 10 mg by mouth daily. Indications: Non-Seasonal Allergic Runny Nose     No current facility-administered medications for this visit. ALLERGIES     Allergies   Allergen Reactions    Sulfa (Sulfonamide Antibiotics) Other (comments)     Causes migraines    Tramadol Itching          SOCIAL HISTORY     Social History     Socioeconomic History    Marital status:      Spouse name: Not on file    Number of children: 3    Years of education: Not on file    Highest education level: Not on file   Occupational History    Occupation:  at [employer], Circulation desk   Tobacco Use    Smoking status: Former Smoker     Last attempt to quit: 1985     Years since quittin.7    Smokeless tobacco: Never Used    Tobacco comment: used to be a light smoker x 3 yrs   Substance and Sexual Activity    Alcohol use:  Yes     Alcohol/week: 0.0 standard drinks     Comment: rare, maybe 1-2 glasses of wine a month at most    Drug use: No    Sexual activity: Yes     Partners: Male     Comment: Postmenopause   Other Topics Concern    Caffeine Concern No     Comment: 1 cup of coffee  qd    Weight Concern Yes     Comment: wants to lose wt    Special Diet Yes     Comment: since  cut out processed foods, cut back on junk food, cut out sodas, not addiding salt    Exercise No     Comment: was swimming some over the summer but not lately        IMMUNIZATIONS  Immunization History   Administered Date(s) Administered    Influenza Vaccine 2012, 2013    Influenza Vaccine (Quad) PF (>6 Mo Flulaval, Fluarix, and >3 Yrs Afluria, Fluzone [id number]) 2017, 10/08/2020    TDAP Vaccine 2006    Td 2016    Tdap 2020         FAMILY HISTORY     Family History   Problem Relation Age of Onset    Kidney Disease Mother         kidney transplant age 77yo ;  renal failure age 80    Diabetes Mother 36    High Cholesterol Mother     Arthritis-osteo Mother         B knee replacements in her 63's    Heart Disease Father     Diabetes Father     Dementia Father         Alzheimer's;  after fall and hip fx    Heart Surgery Father         CABG in his 63's   Kirstin Sung No Known Problems Son     Allergic Rhinitis Daughter     No Known Problems Daughter     Hypertension Brother     Hypertension Brother     Diabetes Brother         mild, controlling w/ diet    Diabetes Maternal Grandfather     Heart Attack Maternal Grandfather 62         MI          VITALS     Visit Vitals  /79   Pulse 80   Temp 97.6 °F (36.4 °C) (Temporal)   Resp 20   Ht 5' 7\" (1.702 m)   Wt 230 lb 3.2 oz (104.4 kg)   LMP 2011 (Within Years)   SpO2 98%   BMI 36.05 kg/m²          PHYSICAL EXAMINATION   Physical Exam  Vitals signs reviewed. Constitutional:       Appearance: She is obese. She is not toxic-appearing. HENT:      Right Ear: Tympanic membrane normal.      Left Ear: Tympanic membrane normal.   Eyes:      General: No scleral icterus. Conjunctiva/sclera: Conjunctivae normal.   Neck:      Musculoskeletal: Neck supple. Cardiovascular:      Rate and Rhythm: Normal rate and regular rhythm. Heart sounds: Normal heart sounds. Pulmonary:      Effort: Pulmonary effort is normal.      Breath sounds: Normal breath sounds. Abdominal:      Palpations: Abdomen is soft. Tenderness: There is no abdominal tenderness. Musculoskeletal:      Right lower leg: No edema. Left lower leg: No edema. Comments: Heberden's nodes B DIP's. No effusions. No erythema. Skin:     General: Skin is warm and dry. Neurological:      General: No focal deficit present. Mental Status: She is oriented to person, place, and time.    Psychiatric:         Mood and Affect: Mood normal.         Behavior: Behavior normal.             DIAGNOSTIC DATA         LABORATORY DATA     Results for orders placed or performed in visit on    METABOLIC PANEL, COMPREHENSIVE   Result Value Ref Range    Glucose 123 (H) 65 - 99 mg/dL    BUN 15 8 - 27 mg/dL    Creatinine 0.79 0.57 - 1.00 mg/dL    GFR est non-AA 81 >59 mL/min/1.73    GFR est AA 93 >59 mL/min/1.73    BUN/Creatinine ratio 19 12 - 28    Sodium 142 134 - 144 mmol/L    Potassium 4.6 3.5 - 5.2 mmol/L    Chloride 103 96 - 106 mmol/L    CO2 21 20 - 29 mmol/L    Calcium 9.6 8.7 - 10.3 mg/dL    Protein, total 7.1 6.0 - 8.5 g/dL    Albumin 4.8 3.8 - 4.8 g/dL    GLOBULIN, TOTAL 2.3 1.5 - 4.5 g/dL    A-G Ratio 2.1 1.2 - 2.2    Bilirubin, total 0.4 0.0 - 1.2 mg/dL    Alk. phosphatase 92 39 - 117 IU/L    AST (SGOT) 36 0 - 40 IU/L    ALT (SGPT) 44 (H) 0 - 32 IU/L   CBC W/O DIFF   Result Value Ref Range    WBC 5.3 3.4 - 10.8 x10E3/uL    RBC 4.92 3.77 - 5.28 x10E6/uL    HGB 14.5 11.1 - 15.9 g/dL    HCT 42.9 34.0 - 46.6 %    MCV 87 79 - 97 fL    MCH 29.5 26.6 - 33.0 pg    MCHC 33.8 31.5 - 35.7 g/dL    RDW 12.3 11.7 - 15.4 %    PLATELET 815 429 - 964 x10E3/uL   LIPID PANEL   Result Value Ref Range    Cholesterol, total 150 100 - 199 mg/dL    Triglyceride 121 0 - 149 mg/dL    HDL Cholesterol 38 (L) >39 mg/dL    VLDL Cholesterol Rubio 22 5 - 40 mg/dL    LDL Chol Calc (NIH) 90 0 - 99 mg/dL   HEMOGLOBIN A1C WITH EAG   Result Value Ref Range    Hemoglobin A1c 5.9 (H) 4.8 - 5.6 %    Estimated average glucose 123 mg/dL   TSH 3RD GENERATION   Result Value Ref Range    TSH 1.910 0.450 - 4.500 uIU/mL   VITAMIN D, 25 HYDROXY   Result Value Ref Range    VITAMIN D, 25-HYDROXY 56.3 30.0 - 100.0 ng/mL   CVD REPORT   Result Value Ref Range    INTERPRETATION Note             ASSESSMENT & PLAN       ICD-10-CM ICD-9-CM    1. Routine general medical examination at a health care facility  Z00.00 V70.0    2. Needs flu shot  Z23 V04.81 INFLUENZA VIRUS VAC QUAD,SPLIT,PRESV FREE SYRINGE IM   3. Mixed hyperlipidemia  E78.2 272.2    4. Prediabetes  R73.03 790.29    5.  BMI 36.0-36.9,adult  Z68.36 V85.36      Fasting labs from 9/22 reviewed w/ pt in detail today  Cardiovascular risk and specific lipid/LDL/BP/BS/Hgba1c goals reviewed  Reviewed diet, nutrition, exercise, weight management, BMI/goals, age/risk based screening recommendations, health maintenance & prevention counseling. Cancer screening USPTFS guidelines reviewed w/ pt today. Discussed benefits/positive/negative outcomes of screening based on age/risk stratification. Informed consent for/against screening based on pt's personal hx/risk factors. Updated in history above and health maintenance. Reviewed medications and side effects, doing well on current regimen  Sees gyn annually, Gyn-Onc Dr. Blanca Vaca at [facility], pap reportedly negative 7-6-2020, and mammogram reportedly negative 7-26-20 at [facility]. Colonoscopy screen up to date.    Flu vaccine updated today w/o sequelae  Follow up 1 yr, sooner prn

## 2020-12-05 DIAGNOSIS — F32.A CHRONIC DEPRESSION: Primary | ICD-10-CM

## 2020-12-05 DIAGNOSIS — R45.89 DEPRESSED MOOD: ICD-10-CM

## 2020-12-05 RX ORDER — BUPROPION HYDROCHLORIDE 150 MG/1
150 TABLET ORAL
Qty: 30 TAB | Refills: 1 | Status: SHIPPED | OUTPATIENT
Start: 2020-12-05 | End: 2021-01-22

## 2021-01-06 DIAGNOSIS — E78.2 MIXED HYPERLIPIDEMIA: ICD-10-CM

## 2021-01-07 RX ORDER — ATORVASTATIN CALCIUM 10 MG/1
TABLET, FILM COATED ORAL
Qty: 90 TAB | Refills: 0 | Status: SHIPPED | OUTPATIENT
Start: 2021-01-07 | End: 2021-04-29 | Stop reason: SDUPTHER

## 2021-01-22 ENCOUNTER — PATIENT MESSAGE (OUTPATIENT)
Dept: FAMILY MEDICINE CLINIC | Age: 61
End: 2021-01-22

## 2021-01-22 ENCOUNTER — VIRTUAL VISIT (OUTPATIENT)
Dept: FAMILY MEDICINE CLINIC | Age: 61
End: 2021-01-22
Payer: COMMERCIAL

## 2021-01-22 DIAGNOSIS — E66.9 OBESITY (BMI 30-39.9): ICD-10-CM

## 2021-01-22 DIAGNOSIS — F32.A CHRONIC DEPRESSION: Primary | ICD-10-CM

## 2021-01-22 PROBLEM — E66.01 SEVERE OBESITY (HCC): Status: RESOLVED | Noted: 2019-10-01 | Resolved: 2021-01-22

## 2021-01-22 PROCEDURE — 99213 OFFICE O/P EST LOW 20 MIN: CPT | Performed by: FAMILY MEDICINE

## 2021-01-22 RX ORDER — BUPROPION HYDROCHLORIDE 300 MG/1
300 TABLET ORAL
Qty: 90 TAB | Refills: 2 | Status: SHIPPED | OUTPATIENT
Start: 2021-01-22 | End: 2021-10-13

## 2021-01-22 NOTE — PROGRESS NOTES
VIRTUAL VISIT    Patient seen via virtual visit today for the following:  Chief Complaint   Patient presents with    Depression     Follow Up    Medication Check     Wellbutrin       HISTORY OF PRESENT ILLNESS   HPI  Patient presents for follow up depression and medication check after restarting Wellbutrin ~ 6-8 weeks ago. She is again tolerating the  mg dose daily and it is working really well for her. She feels as though it has \"taken the blah out of her days\" and she is feeling less despondent. She feels a lot better and is more motivated but not at 100% quite yet. She is however very pleased w/ her progress and how she is feeling and would like to titrate up to the 300 mg that she was on before back in 2019. She is motivated to start taking better care of herself. She would like some help w/ meal planning for healthier eating and will start walking for exercise when weather is nicer. She is inquiring about a dietician. REVIEW OF SYMPTOMS   Review of Systems   Constitutional: Negative. Respiratory: Negative. Cardiovascular: Negative. Gastrointestinal: Negative. Neurological: Negative. Psychiatric/Behavioral: The patient is not nervous/anxious and does not have insomnia.             PROBLEM LIST/MEDICAL HISTORY     Problem List  Date Reviewed: 1/22/2021          Codes Class Noted    Prediabetes ICD-10-CM: R73.03  ICD-9-CM: 790.29  9/12/2020        History of endometrial cancer ICD-10-CM: Z85.42  ICD-9-CM: V10.42  4/1/2020        Moderate persistent asthma without complication MAHENDRA-53-BT: A49.55  ICD-9-CM: 493.90  3/21/2020    Overview Signed 3/21/2020  6:55 PM by Sean Castro MD     3-2020             Severe obesity ([facility] [address] ICD-10-CM: E66.01  ICD-9-CM: 278.01  10/1/2019        Closed fracture of left ankle ICD-10-CM: Y93.813R  ICD-9-CM: 824.8  8/26/2019    Overview Addendum 8/26/2019  8:35 AM by MD NORM Navarrete Dr. Winfred Lander 6-1-19, boot, no surgery             Mixed hyperlipidemia ICD-10-CM: E78.2  ICD-9-CM: 272.2  8/26/2019        Mild intermittent asthma without complication ICD-10-CM: J45.20  ICD-9-CM: 493.90  5/9/2019    Overview Signed 5/9/2019 12:22 PM by Padmini Norman MD     2-3 x a year triggered by peak allergy time or URI's             Perennial allergic rhinitis with seasonal variation ICD-10-CM: J30.89, J30.2  ICD-9-CM: 477.9  5/9/2019    Overview Signed 5/9/2019 12:27 PM by Padmini Norman MD     Peaks spring and fall             Gross hematuria ICD-10-CM: R31.0  ICD-9-CM: 599.71  11/30/2017        DDD (degenerative disc disease), cervical ICD-10-CM: M50.30  ICD-9-CM: 722.4  7/14/2016        Cervical spinal stenosis w/ right radiculopathy ICD-10-CM: M48.02  ICD-9-CM: 723.0  7/14/2016    Overview Signed 7/14/2016  8:43 AM by Padmini Norman MD     2016 Dr Otero, PT             Screening exams for skin cancer ICD-10-CM: Z12.83  ICD-9-CM: V76.43  3/30/2016    Overview Signed 3/30/2016 11:22 AM by Padmini Norman MD     Dermatologist routine checkes             Avulsion fracture of right ankle ICD-10-CM: S82.899A  ICD-9-CM: 824.8  7/10/2015    Overview Signed 7/10/2015  9:02 AM by Padmini Norman MD     ~6-2015, [facility], boot             Baker's cyst ICD-10-CM: M71.20  ICD-9-CM: 727.51  12/9/2013    Overview Signed 12/9/2013 11:58 AM by Padmini Norman MD     Left knee; 12/6/13, Dr Sharma             Vitamin D deficiency ICD-10-CM: E55.9  ICD-9-CM: 268.9  12/23/2012        Seasonal allergic rhinitis ICD-10-CM: J30.2  ICD-9-CM: 477.9  12/6/2012    Overview Signed 12/6/2012  2:27 PM by Padmini Norman MD     Spring and Fall worst             Pneumonia ICD-10-CM: J18.9  ICD-9-CM: 486  12/6/2012    Overview Signed 12/6/2012  2:28 PM by Padmini Norman MD     Spring 2012, treated outpt, [facility]             History of depression ICD-10-CM: Z86.59  ICD-9-CM: V11.8  12/6/2012  Overview Addendum 6/26/2013  2:16 PM by Li Albarado MD     Treated sporadically over the years w/ Effexor or Celexa (better)             Chronic depression ICD-10-CM: F32.9  ICD-9-CM: 477  12/6/2012        H/O Recurrent Asthmatic Bronchitis ICD-10-CM: J45.909  ICD-9-CM: 493.90  12/6/2012    Overview Signed 12/6/2012  2:38 PM by Li Albarado MD     Since ~ 2006             OA (osteoarthritis) of left knee ICD-10-CM: M17.10  ICD-9-CM: 715.36  12/6/2012    Overview Addendum 3/25/2015 12:00 PM by Li Albarado MD     Xrays 2011; Ortho Dr Clara Dodge, 12/2013, xrayed again, advanced DJD/OA ; CSI 12/2013, 3/2014, 6/2014, 12/2014                       PAST SURGICAL HISTORY     Past Surgical History:   Procedure Laterality Date    HX BREAST BIOPSY  12/17/12    right breast, benign, calcifications; Sara Kruger; Fibrosis    HX COLONOSCOPY  04/14/2015    Normal, follow up 10 yrs, Dr Jan Ricketts Left 08/2016    Dr Edilma Vargas    [address]  [address]    [address] removed from right upper back    HX TOTAL LAPAROSCOPIC HYSTERECTOMY W/ BS&O  9/24/2015, Dr Gerard Palomino    Total Lap Hysterectomy and BSO; Endometrial cancer; grade 1 endometrioid adenocarcinoma.; Dr. Hernandez Ip, 25 yo         MEDICATIONS     Current Outpatient Medications   Medication Sig    atorvastatin (LIPITOR) 10 mg tablet TAKE 1 TABLET BY MOUTH EVERY DAY    buPROPion XL (WELLBUTRIN XL) 150 mg tablet Take 1 Tab by mouth every morning.  montelukast (SINGULAIR) 10 mg tablet TAKE 1 TABLET BY MOUTH EVERY DAY    cholecalciferol, VITAMIN D3, (VITAMIN D3) 5,000 unit tab tablet Take 1 Tab by mouth daily.  multivitamin (ONE A DAY) tablet Take 1 Tab by mouth daily. Includes Vitamin D 1,000 units    cetirizine (ZYRTEC) 10 mg tablet Take 10 mg by mouth daily.  Indications: Non-Seasonal Allergic Runny Nose    nitrofurantoin, macrocrystal-monohydrate, (MACROBID) 100 mg capsule     albuterol sulfate (ProAir RespiClick) 90 mcg/actuation aepb Take 2 Puffs by inhalation every six (6) hours as needed for Wheezing or Cough. No current facility-administered medications for this visit. ALLERGIES     Allergies   Allergen Reactions    Sulfa (Sulfonamide Antibiotics) Other (comments)     Causes migraines    Tramadol Itching          SOCIAL HISTORY     Social History     Tobacco Use    Smoking status: Former Smoker     Quit date: 1985     Years since quittin.0    Smokeless tobacco: Never Used    Tobacco comment: used to be a light smoker x 3 yrs   Substance Use Topics    Alcohol use:  Yes     Alcohol/week: 0.0 standard drinks     Comment: rare, maybe 1-2 glasses of wine a month at most     Social History     Social History Narrative    Not on file     Social History     Substance and Sexual Activity   Sexual Activity Yes    Partners: Male    Comment: Postmenopause       IMMUNIZATIONS     Immunization History   Administered Date(s) Administered    Influenza Vaccine 2012, 2013    Influenza Vaccine (Quad) PF (>6 Mo Flulaval, Fluarix, and >3 Yrs Afluria, Fluzone [id number]) 2017, 10/08/2020    TDAP Vaccine 2006    Td 2016    Tdap 2020         FAMILY HISTORY     Family History   Problem Relation Age of Onset    Kidney Disease Mother         kidney transplant age 77yo ;  renal failure age 80    Diabetes Mother 36    High Cholesterol Mother     Arthritis-osteo Mother         B knee replacements in her 63's    Heart Disease Father     Diabetes Father     Dementia Father         Alzheimer's;  after fall and hip fx    Heart Surgery Father         CABG in his 63's   [address] No Known Problems Son     Allergic Rhinitis Daughter     No Known Problems Daughter     Hypertension Brother     Hypertension Brother     Diabetes Brother         mild, controlling w/ diet    Diabetes Maternal Grandfather     Heart Attack Maternal Grandfather 62  MI          VITALS   Vitals limited due to virtual visit. Self reported data collected today: None         PHYSICAL EXAMINATION   Physical Exam  Constitutional:       General: She is not in acute distress. Neurological:      Mental Status: She is alert and oriented to person, place, and time. Psychiatric:         Mood and Affect: Mood normal. Mood is not anxious or depressed. Behavior: Behavior normal.      Comments: Cheerful, upbeat, optimistic                LABORATORY DATA/ANCILLARY/IMAGING     Results for orders placed or performed in visit on    METABOLIC PANEL, COMPREHENSIVE   Result Value Ref Range    Glucose 123 (H) 65 - 99 mg/dL    BUN 15 8 - 27 mg/dL    Creatinine 0.79 0.57 - 1.00 mg/dL    GFR est non-AA 81 >59 mL/min/1.73    GFR est AA 93 >59 mL/min/1.73    BUN/Creatinine ratio 19 12 - 28    Sodium 142 134 - 144 mmol/L    Potassium 4.6 3.5 - 5.2 mmol/L    Chloride 103 96 - 106 mmol/L    CO2 21 20 - 29 mmol/L    Calcium 9.6 8.7 - 10.3 mg/dL    Protein, total 7.1 6.0 - 8.5 g/dL    Albumin 4.8 3.8 - 4.8 g/dL    GLOBULIN, TOTAL 2.3 1.5 - 4.5 g/dL    A-G Ratio 2.1 1.2 - 2.2    Bilirubin, total 0.4 0.0 - 1.2 mg/dL    Alk.  phosphatase 92 39 - 117 IU/L    AST (SGOT) 36 0 - 40 IU/L    ALT (SGPT) 44 (H) 0 - 32 IU/L   CBC W/O DIFF   Result Value Ref Range    WBC 5.3 3.4 - 10.8 x10E3/uL    RBC 4.92 3.77 - 5.28 x10E6/uL    HGB 14.5 11.1 - 15.9 g/dL    HCT 42.9 34.0 - 46.6 %    MCV 87 79 - 97 fL    MCH 29.5 26.6 - 33.0 pg    MCHC 33.8 31.5 - 35.7 g/dL    RDW 12.3 11.7 - 15.4 %    PLATELET 910 192 - 283 x10E3/uL   LIPID PANEL   Result Value Ref Range    Cholesterol, total 150 100 - 199 mg/dL    Triglyceride 121 0 - 149 mg/dL    HDL Cholesterol 38 (L) >39 mg/dL    VLDL, calculated 22 5 - 40 mg/dL    LDL, calculated 90 0 - 99 mg/dL   HEMOGLOBIN A1C WITH EAG   Result Value Ref Range    Hemoglobin A1c 5.9 (H) 4.8 - 5.6 %    Estimated average glucose 123 mg/dL   TSH 3RD GENERATION   Result Value Ref Range    TSH 1.910 0.450 - 4.500 uIU/mL   VITAMIN D, 25 HYDROXY   Result Value Ref Range    VITAMIN D, 25-HYDROXY 56.3 30.0 - 100.0 ng/mL   CVD REPORT   Result Value Ref Range    INTERPRETATION Note           ASSESSMENT & PLAN       ICD-10-CM ICD-9-CM    1. Chronic depression, improving F32.9 311 buPROPion XL (WELLBUTRIN XL) 300 mg XL tablet   2. Obesity (BMI 30-39. 9)  E66.9 278.00 REFERRAL TO DIETITIAN     Improving back on Wellbutrin. Titrate Wellbutrin XL from 150 to 300 mg daily which worked effectively for her a few years ago and she has historically tolerated this well. Reviewed medications and side effects in detail. Reviewed diet, nutrition, exercise, weight management, BMI/goals. Referred to dietician. Information given for [employer] and [employer]. Fasting CPE . Follow up sooner prn    ---------------------------------------------------------------------------------------------------------------  Patient is being evaluated by a virtual/ video visit encounter to address concerns as noted above. Patient identification was verified at the start of the visit: YES  A caregiver was present when appropriate. Due to this being a TeleHealth encounter (During TUI-23 public health emergency), evaluation of the following organ systems was limited: Vitals/Constitutional/EENT/Resp/CV/GI//MS/Neuro/Skin/Heme-Lymph-Imm. Pursuant to the emergency declaration under the [facility]5 waiver authority and the [employer] Act, this Virtual  Visit was conducted, with patient's (and/or legal guardian's) consent, to reduce the patient's risk of exposure to COVID-19 and provide necessary medical care. Services were provided through a video synchronous discussion virtually to substitute for in-person clinic visit.    The patient (and/or legal guardian) has also been advised to contact this office for worsening conditions or problems, and seek emergency medical treatment and/or call 911 if deemed necessary. Patient was located at their own home. I was at home while conducting this encounter. Consent:  She and/or her healthcare decision maker is aware that this patient-initiated Telehealth encounter is a billable service, with coverage as determined by her insurance carrier. She is aware that she may receive a bill and has provided verbal consent to proceed: Yes  This virtual visit was conducted via [employer] me. Pursuant to the emergency declaration under the [facility], [facility] waiver authority and the [employer] and [employer] Act, this Virtual  Visit was conducted to reduce the patient's risk of exposure to COVID-19 and provide continuity of care for an established patient. Services were provided through a video synchronous discussion virtually to substitute for in-person clinic visit. Due to this being a TeleHealth evaluation, many elements of the physical examination are unable to be assessed. Total Time: minutes: 20 minutes. An electronic signature was used to authenticate this note.   ~Padmini Sy MD~

## 2021-01-22 NOTE — TELEPHONE ENCOUNTER
From: Bradford Poon  To: Gary Montes MD  Sent: 1/22/2021 1:02 PM EST  Subject: Visit Follow-Up Question    Thanks for the virtual visit today! I meant to ask if your clinic has the Covid vaccine, I just learned thru a [address] that Im considered to be in Phase 1B (since I have an underlying health condition). Is this something Im able to get through [facility]? Thanks!  Mary Taylor

## 2021-01-30 DIAGNOSIS — R45.89 DEPRESSED MOOD: ICD-10-CM

## 2021-01-30 RX ORDER — BUPROPION HYDROCHLORIDE 150 MG/1
TABLET ORAL
Qty: 30 TAB | Refills: 1 | OUTPATIENT
Start: 2021-01-30

## 2021-02-03 ENCOUNTER — TRANSCRIBE ORDER (OUTPATIENT)
Dept: SCHEDULING | Age: 61
End: 2021-02-03

## 2021-02-03 DIAGNOSIS — E66.9 OBESITY, UNSPECIFIED: Primary | ICD-10-CM

## 2021-02-05 ENCOUNTER — TELEPHONE (OUTPATIENT)
Dept: CARDIAC REHAB | Age: 61
End: 2021-02-05

## 2021-02-05 NOTE — TELEPHONE ENCOUNTER
2/5/2021 Cardiac Wellness: Ms. Luis F Mares called to discuss nutrition appointment with Jessie. Her PCP referred her to [place], but she would like to meet with Jessie at our location.  Nutrition consult in for 2/16/2021 @ 930am.  Denice Cohen

## 2021-02-16 ENCOUNTER — HOSPITAL ENCOUNTER (OUTPATIENT)
Dept: CARDIAC REHAB | Age: 61
Discharge: HOME | End: 2021-02-16
Payer: COMMERCIAL

## 2021-02-16 VITALS — HEIGHT: 69 IN | BODY MASS INDEX: 34.63 KG/M2 | WEIGHT: 233.8 LBS

## 2021-02-16 PROCEDURE — 97802 MEDICAL NUTRITION INDIV IN: CPT | Performed by: DIETITIAN, REGISTERED

## 2021-02-16 NOTE — PROGRESS NOTES
[address] [facility]. NOTE  DATE: 2021      REFERRING PHYSICIAN: Dr. Negra Mayo    NAME: Caprice Jamison : 1960 AGE: 63 y.o. GENDER: female    REASON FOR VISIT: obesity    PAST MEDICAL HISTORY:  H/o endometrial cancer, hysterectomy in 2015; hyperlipidemia, prediabetes, depression, osteoarthritis, h/o knee replacement    LABS:   Lab Results   Component Value Date/Time    Cholesterol, total 150 2020 11:01 AM    HDL Cholesterol 38 (L) 2020 11:01 AM    LDL, calculated 90 2020 11:01 AM    LDL, calculated 113 (H) 2019 08:48 AM    VLDL, calculated 22 2020 11:01 AM    VLDL, calculated 30 2019 08:48 AM    Triglyceride 121 2020 11:01 AM     Lab Results   Component Value Date/Time    Hemoglobin A1c 5.9 (H) 2020 11:01 AM       MEDICATIONS/SUPPLEMENTS:   [unfilled]  Prior to Admission medications    Medication Sig Start Date End Date Taking? Authorizing Provider   buPROPion XL (WELLBUTRIN XL) 300 mg XL tablet Take 1 Tab by mouth every morning. 21   Fountain-Ellis, Caralee Severin, MD   atorvastatin (LIPITOR) 10 mg tablet TAKE 1 TABLET BY MOUTH EVERY DAY 21   Fountain-Ellis, Caralee Severin, MD   nitrofurantoin, macrocrystal-monohydrate, (MACROBID) 100 mg capsule  10/3/20   Provider, Historical   montelukast (SINGULAIR) 10 mg tablet TAKE 1 TABLET BY MOUTH EVERY DAY 20   Fountain-Ellis, Caralee Severin, MD   albuterol sulfate (ProAir RespiClick) 90 mcg/actuation aepb Take 2 Puffs by inhalation every six (6) hours as needed for Wheezing or Cough. 3/21/20   Fountain-Ellis, Caralee Severin, MD   cholecalciferol, VITAMIN D3, (VITAMIN D3) 5,000 unit tab tablet Take 1 Tab by mouth daily. 19   Fountain-Ellis, Caralee Severin, MD   multivitamin (ONE A DAY) tablet Take 1 Tab by mouth daily. Includes Vitamin D 1,000 units 19   Fountain-Ellis, Caralee Severin, MD   cetirizine (ZYRTEC) 10 mg tablet Take 10 mg by mouth daily.  Indications: Non-Seasonal Allergic Runny Nose    Provider, Historical       EXERCISE/PHYSICAL ACTIVITY: sedentary; hurts to walk    ALCOHOL / TOBACCO USE: rarely alcohol; no tobacco    SOCIAL HISTORY: works full time, currently from home; lives with ; has grown children    REPORTED WEIGHT HISTORY: lost weight on Whole 30, lost weight when training for a 5K, and lost weight for her childrens' weddings but each time gained it back, lost weight early last year but not sure how or why        ANTHROPOMETRICS:    Ht Readings from Last 1 Encounters:   02/16/21 5' 9\" (1.753 m)      Wt Readings from Last 10 Encounters:   02/16/21 106.1 kg (233 lb 12.8 oz)   10/08/20 104.4 kg (230 lb 3.2 oz)   01/03/20 110.2 kg (243 lb)   12/16/19 109.8 kg (242 lb)   10/15/19 109.8 kg (242 lb)   10/01/19 109.3 kg (241 lb)   08/26/19 109.7 kg (241 lb 12.8 oz)   05/09/19 110.3 kg (243 lb 3.2 oz)   09/12/18 102.5 kg (226 lb)   11/30/17 94.3 kg (208 lb)      IBW:145 # +/- 10%  %IBW: 161 % +/- 10%    BMI: 34.5 kg/M2 Category: obese          NUTRITION ASSESSMENT:    FOOD ALLERGIES/INTOLERANCES: no known food allergies    24 HOUR DIET RECALL  Breakfast   2 pcs peanut butter toast (whole wheat), Vitamin Zero water, coffee with almond milk (unsweetened)   Snack  Chocolate chip chewy granola bar   Lunch  [employer]: macaroni & cheese; water   Snack  Chocolate chip chewy granola   Dinner  [employer] original chicken sandwich; water   Snack       Félixquique Valderramaginny Cramerdanaleobardoja reports her downfall is eating out, usually one meal a day though yesterday was 2. Some days has cereal for breakfast, sandwich for lunch. A recent meal cooked at home was BBQ chicken (skinless breasts), steamed broccoli (with butter), crescent rolls (with butter) and rice packet. Tries to eat more of the vegetable than the starch. She states she was eating better before the pandemic. Tends to get overwhelmed with all the choices so orders online and with pandemic afraid of the grocery store.  History of doing Whole 30 and felt good on it, lost weight, but unable to stay with it because too restrictive. She prefers simple and easy but not a fad diet. She is looking for guidance on a healthy sustainable approach to weight loss. Heart disease and diabetes run in her family and she is motivated to make changes to reduce her risk. NUTRITION DIAGNOSIS:  Obesity related to food preferences, voiced barriers and nutrition related knowledge deficit as evidenced by pt request for RD education and counseling. ESTIMATED ENERGY NEEDS:   2161 (using [address] with AF 1.3) for weight maintenance; - 500 for weight loss at rate of 1 lb per week    NUTRITION INTERVENTION:  Nutrition 60 minute one-on-one education & goal setting with Asad Boudreaux    Reviewed with Asad Boudreaux relevant labs compared to ideals.     Reviewed weight history and patient's verbalized weight goal as well as any real or perceived barriers to obtaining the goal. Collaborated with patient to set a specific short and long term weight goal.     Conducted a verbal diet recall and assessed for environmental, financial, psychosocial, physical and comorbidities that may impact the food and eating patterns / behaviors of [facility] with patient to set specific nutrient goals as well as specific food / behavior changes that will help patient meet the overall goal of: weight loss & reduced risk factors for DM and CAD    NUTRITION EDUCATION / HANDOUTS PROVIDED:  - Recipes: January & February heart healthy  - Diabetes Plate  - Fast Food Best Bites  - Guide to Choosing Healthier Frozen Meals  -  to Go information  - Healthy Meal Builder Mix & Match Guide      PATIENT GOALS:    Weight Goals:    Short Term Weight Goal: 1 lb per week weight loss = 232 lbs by 2 week follow-up  Long Term Weight Goal: </= 210 lbs (10% weight loss)    Nutrition Goals:    Calories: 1600 /day     Behavior Goals:  - keep temptations (e.g. chocolate granola bars) out of the house or stored in a separate cabinet for  / ask him to store it at his office  - plan meals; goal to eat at least 4 home prepared dinners per week (okay if leftovers)  - use meal plate template or mix & match guide to help build balanced portion-controlled meals  - read and compare nutrition facts to help guide choices at restaurants and within prepared meal options  - exercise every other day; can use [employer] pavel as before, cycle, or use home exercise videos, etc          Specific tips and techniques to facilitate compliance with above recommendations were provided and discussed. eGo [address] 177 verbalized understanding. The patient was encouraged to contact me as needed.       Follow-up: recommended and scheduled in 2-3 weeks              Vanessa Dumont RD, CDCES

## 2021-03-05 ENCOUNTER — TELEPHONE (OUTPATIENT)
Dept: CARDIAC REHAB | Age: 61
End: 2021-03-05

## 2021-03-08 ENCOUNTER — APPOINTMENT (OUTPATIENT)
Dept: CARDIAC REHAB | Age: 61
End: 2021-03-08

## 2021-04-14 ENCOUNTER — TELEPHONE (OUTPATIENT)
Dept: FAMILY MEDICINE CLINIC | Age: 61
End: 2021-04-14

## 2021-04-14 RX ORDER — NITROFURANTOIN 25; 75 MG/1; MG/1
100 CAPSULE ORAL 2 TIMES DAILY
Qty: 6 CAP | Refills: 0 | Status: SHIPPED | OUTPATIENT
Start: 2021-04-14 | End: 2021-04-17

## 2021-04-14 NOTE — TELEPHONE ENCOUNTER
Spoke to patient , verified . Pt stated she has had burning, urinary frequency, and yeasty smell since yesterday. She thinks another UTI. Pt request meds sent to [facility]  [facility], [address] [place] [address] is possible. Please advise.   Ketan Goel LPN

## 2021-04-14 NOTE — TELEPHONE ENCOUNTER
Patient called to c/o symptoms of UTI, states she is out of town, requesting to have a rx called in.       Patient would like a return call        ADOLFO#[phone]

## 2021-04-29 DIAGNOSIS — E78.2 MIXED HYPERLIPIDEMIA: ICD-10-CM

## 2021-04-29 NOTE — TELEPHONE ENCOUNTER
Last Visit: VV 1/22/21 MD Norman, lipid lab done  9/2020  Next Appointment: Not scheduled  Previous Refill Encounter(s): 1/7/21 90    Requested Prescriptions     Pending Prescriptions Disp Refills    atorvastatin (LIPITOR) 10 mg tablet 90 Tab 1     Sig: Take 1 Tab by mouth daily.

## 2021-04-30 RX ORDER — ATORVASTATIN CALCIUM 10 MG/1
10 TABLET, FILM COATED ORAL DAILY
Qty: 90 TAB | Refills: 1 | Status: SHIPPED | OUTPATIENT
Start: 2021-04-30 | End: 2021-11-20

## 2021-08-23 DIAGNOSIS — J30.2 PERENNIAL ALLERGIC RHINITIS WITH SEASONAL VARIATION: ICD-10-CM

## 2021-08-23 DIAGNOSIS — J30.89 PERENNIAL ALLERGIC RHINITIS WITH SEASONAL VARIATION: ICD-10-CM

## 2021-08-23 DIAGNOSIS — J45.40 MODERATE PERSISTENT ASTHMA WITHOUT COMPLICATION: ICD-10-CM

## 2021-08-23 RX ORDER — MONTELUKAST SODIUM 10 MG/1
TABLET ORAL
Qty: 90 TABLET | Refills: 0 | Status: SHIPPED | OUTPATIENT
Start: 2021-08-23 | End: 2022-01-27

## 2021-08-24 NOTE — TELEPHONE ENCOUNTER
Patient due follow up office visit and annual checkup. Nothing scheduled. Please advise, needs to get scheduled asap.

## 2021-09-27 NOTE — TELEPHONE ENCOUNTER
Labs pre-ordered for review at upcoming appt w/ PCP scheduled 10/8/2020.   Future Appointments  Date Time Provider Valdemar Maldonado  10/8/2020  2:00 PM Ab Norman MD PAFP BS [facility] Detail Level: Detailed

## 2021-10-13 DIAGNOSIS — F32.A CHRONIC DEPRESSION: ICD-10-CM

## 2021-10-13 RX ORDER — BUPROPION HYDROCHLORIDE 300 MG/1
TABLET ORAL
Qty: 30 TABLET | Refills: 0 | Status: SHIPPED | OUTPATIENT
Start: 2021-10-13 | End: 2022-01-27 | Stop reason: SDUPTHER

## 2021-11-20 DIAGNOSIS — E78.2 MIXED HYPERLIPIDEMIA: ICD-10-CM

## 2021-11-20 RX ORDER — ATORVASTATIN CALCIUM 10 MG/1
10 TABLET, FILM COATED ORAL DAILY
Qty: 30 TABLET | Refills: 0 | Status: SHIPPED | OUTPATIENT
Start: 2021-11-20 | End: 2022-01-27

## 2022-01-07 ENCOUNTER — TELEPHONE (OUTPATIENT)
Dept: FAMILY MEDICINE CLINIC | Age: 62
End: 2022-01-07

## 2022-01-07 RX ORDER — PREDNISONE 10 MG/1
TABLET ORAL
Qty: 21 TABLET | Refills: 0 | Status: CANCELLED | OUTPATIENT
Start: 2022-01-07

## 2022-01-07 NOTE — TELEPHONE ENCOUNTER
Hi MJ,  I just called the office to try to be seen by Dr. Sp Scott today, as I threw my back out on Tuesday. It has really not gotten better, Melva missed 3 days of work and am supposed to work tomorrow. Unfortunately there are no time slots (virtual or in person) with any providers until Monday.      Since this is an issue Melva had in the past, I was hoping I could have something called in for me to help me get around without having to come into the office. I have taken my muscle relaxer and Advil, but neither have really helped much.      Would it be possible for me to get an Rx for prednisone? Or could you squeeze in a virtual visit for this afternoon?        Thanks  Gen[place] July  [phone]    She also ask me to schedule her for a fasting OV. I have her scheduled for 1/26 @ 9:30.

## 2022-01-09 NOTE — TELEPHONE ENCOUNTER
Sorry, this message was sent to me on Friday afternoon when I was off for the day so I hope she wasn't expecting a call back that day. I am just seeing this now on Sunday afternoon and it may not be an issue anymore now. Hopefully she is better now or went to urgent care. But she also wanted to get labs done? ?? and the Medrol could affect that as well. This is a lot going on in one message so I wanted to wait until you could call her Monday morning for an update and to review some things. Please apologize to her that I didn't get the message and that I was already gone bc I only work half days on Friday, mornings only. Also is her upcoming appt for a fasting CPE? Again, billing issue.

## 2022-01-26 ENCOUNTER — OFFICE VISIT (OUTPATIENT)
Dept: FAMILY MEDICINE CLINIC | Age: 62
End: 2022-01-26
Payer: COMMERCIAL

## 2022-01-26 VITALS
BODY MASS INDEX: 33.24 KG/M2 | WEIGHT: 224.4 LBS | RESPIRATION RATE: 16 BRPM | OXYGEN SATURATION: 97 % | DIASTOLIC BLOOD PRESSURE: 84 MMHG | HEIGHT: 69 IN | HEART RATE: 80 BPM | SYSTOLIC BLOOD PRESSURE: 132 MMHG | TEMPERATURE: 98.1 F

## 2022-01-26 DIAGNOSIS — Z23 NEEDS FLU SHOT: ICD-10-CM

## 2022-01-26 DIAGNOSIS — M19.042 ARTHRITIS OF BOTH HANDS: ICD-10-CM

## 2022-01-26 DIAGNOSIS — M72.0 DUPUYTREN'S CONTRACTURE OF RIGHT HAND: ICD-10-CM

## 2022-01-26 DIAGNOSIS — R73.03 PREDIABETES: ICD-10-CM

## 2022-01-26 DIAGNOSIS — F32.A CHRONIC DEPRESSION: ICD-10-CM

## 2022-01-26 DIAGNOSIS — Z23 ENCOUNTER FOR IMMUNIZATION: ICD-10-CM

## 2022-01-26 DIAGNOSIS — M19.041 ARTHRITIS OF BOTH HANDS: ICD-10-CM

## 2022-01-26 DIAGNOSIS — E78.2 MIXED HYPERLIPIDEMIA: Primary | ICD-10-CM

## 2022-01-26 PROCEDURE — 90471 IMMUNIZATION ADMIN: CPT | Performed by: FAMILY MEDICINE

## 2022-01-26 PROCEDURE — 99214 OFFICE O/P EST MOD 30 MIN: CPT | Performed by: FAMILY MEDICINE

## 2022-01-26 PROCEDURE — 90686 IIV4 VACC NO PRSV 0.5 ML IM: CPT | Performed by: FAMILY MEDICINE

## 2022-01-26 RX ORDER — METHENAMINE HIPPURATE 1000 MG/1
1 TABLET ORAL 2 TIMES DAILY WITH MEALS
COMMUNITY
End: 2022-05-20 | Stop reason: ALTCHOICE

## 2022-01-26 RX ORDER — IBUPROFEN 200 MG
400 TABLET ORAL 2 TIMES DAILY
COMMUNITY

## 2022-01-26 NOTE — PROGRESS NOTES
Chief Complaint   Patient presents with    Cholesterol Problem     Fasting follow up    Pre-diabetes    Medication Check    Arthritis     Hands       HISTORY OF PRESENT ILLNESS   HPI  Fasting follow up hypercholesterolemia, prediabetes, labs and medication check. Complying routinely w/ medications and tolerating w/o reaction or side effects. Mood and energy good and stable. Diet: nothing special, admits her eating habits could be better  Caffeine: 1 cup of coffee qam, no tea or sodas  Exercise: walks 2-3 x a week x 30 minutes  Weight: 220's-230's over the years  She is concerned about arthritis affecting both hands. She has stiffness, pain, aching in the DIP's of both hands primarily affecting the middle fingers and pinkies. She has nodules at the DIP joints of the middle fingers. She has some skin surface \"knots\" at the PIP joints of the middle fingers which she states have been that way for years similar to her father's hands all his adult life. She states that her father was diagnosed w/ what sounds like Dupytren's contractures and now she has noticed some nodules on the palm of her right hand. Hard for her to make a fist both hands but the right more noticeable than the left. Her hands really ache and bother her at times. She enjoys doing a lot of knitting and sewing and feels like this is interfering w/ her ability to do those as much. Her mother had what sounds like OA. Patient is concerned about RA. Sometimes the tops of her feet ache as well. She has chronic B knee pain due to advanced OA of both knees followed by ortho. She had left knee surgery in 2016 and now the right is starting to feel that way as well. She takes Advil 400 mg qam.    REVIEW OF SYMPTOMS   Review of Systems   Constitutional: Negative. Respiratory: Negative. Cardiovascular: Negative. Gastrointestinal: Negative. Genitourinary: Negative. Musculoskeletal: Positive for joint pain. Negative for myalgias. Neurological: Negative. Endo/Heme/Allergies: Negative. Psychiatric/Behavioral: Negative.             PROBLEM LIST/MEDICAL HISTORY     Problem List  Date Reviewed: 1/26/2022          Codes Class Noted    Prediabetes ICD-10-CM: R73.03  ICD-9-CM: 790.29  9/12/2020        History of endometrial cancer ICD-10-CM: Z85.42  ICD-9-CM: V10.42  4/1/2020    Overview Signed 1/22/2021 10:30 AM by Ousmane Hauser MD     Grade 1 Endometrioid Carcinoma, S/P Total Lap Hysterectomy-BSO 9-2015; [facility], Dr Judith Trejo               Moderate persistent asthma without complication XNO-06-OR: C85.15  ICD-9-CM: 493.90  3/21/2020    Overview Signed 3/21/2020  6:55 PM by Ousmane Hauser MD     2-0720             Closed fracture of left ankle ICD-10-CM: Z38.612Y  ICD-9-CM: 824.8  8/26/2019    Overview Addendum 8/26/2019  8:35 AM by MD Lory Dawson Dr. [place] 6-1-19, boot, no surgery             Mixed hyperlipidemia ICD-10-CM: E78.2  ICD-9-CM: 272.2  8/26/2019        Mild intermittent asthma without complication VBZ-14-FB: N38.08  ICD-9-CM: 493.90  5/9/2019    Overview Signed 5/9/2019 12:22 PM by Ousmane Hauser MD     2-3 x a year triggered by peak allergy time or URI's             Perennial allergic rhinitis with seasonal variation ICD-10-CM: J30.89, J30.2  ICD-9-CM: 477.9  5/9/2019    Overview Signed 5/9/2019 12:27 PM by Ousmane Hauser MD     Peaks spring and fall             Gross hematuria ICD-10-CM: R31.0  ICD-9-CM: 599.71  11/30/2017        DDD (degenerative disc disease), cervical ICD-10-CM: M50.30  ICD-9-CM: 722.4  7/14/2016        Cervical spinal stenosis w/ right radiculopathy ICD-10-CM: M48.02  ICD-9-CM: 723.0  7/14/2016    Overview Signed 7/14/2016  8:43 AM by Ousmane Hauser MD     2016 Dr Shola Sam, PT             Screening exams for skin cancer ICD-10-CM: Z12.83  ICD-9-CM: V76.43  3/30/2016    Overview Signed 3/30/2016 11:22 AM by Vanessa Meyer MD     Dermatologist routine checkes             Avulsion fracture of right ankle ICD-10-CM: S82.899A  ICD-9-CM: 824.8  7/10/2015    Overview Signed 7/10/2015  9:02 AM by Vanessa Meyer MD     ~6-2015, Manpreet Gutierrezintosh, boot             Baker's cyst ICD-10-CM: M71.20  ICD-9-CM: 727.51  12/9/2013    Overview Signed 12/9/2013 11:58 AM by Vanessa Meyer MD     Left knee; 12/6/13, Dr Kelvin Hunter             Vitamin D deficiency ICD-10-CM: E55.9  ICD-9-CM: 268.9  12/23/2012        Seasonal allergic rhinitis ICD-10-CM: J30.2  ICD-9-CM: 477.9  12/6/2012    Overview Signed 12/6/2012  2:27 PM by Vanessa Meyer MD     Spring and Fall worst             Pneumonia ICD-10-CM: J18.9  ICD-9-CM: 450  12/6/2012    Overview Signed 12/6/2012  2:28 PM by Vanessa Meyer MD     Spring 2012, treated outpt, [facility]             History of depression ICD-10-CM: Z86.59  ICD-9-CM: V11.8  12/6/2012    Overview Addendum 6/26/2013  2:16 PM by Vanessa Meyer MD     Treated sporadically over the years w/ Effexor or Celexa (better)             Chronic depression ICD-10-CM: F32. A  ICD-9-CM: 737  12/6/2012        H/O Recurrent Asthmatic Bronchitis ICD-10-CM: J45.909  ICD-9-CM: 493.90  12/6/2012    Overview Signed 12/6/2012  2:38 PM by Vanessa Meyer MD     Since ~ 2006             OA (osteoarthritis) of left knee ICD-10-CM: M17.10  ICD-9-CM: 715.36  12/6/2012    Overview Addendum 3/25/2015 12:00 PM by Vanessa Meyer MD     Xrays 2011; Ortho Dr Kelvin Hunter, 12/2013, xrayed again, advanced DJD/OA ; CSI 12/2013, 3/2014, 6/2014, 12/2014                       PAST SURGICAL HISTORY     Past Surgical History:   Procedure Laterality Date    HX BREAST BIOPSY  12/17/12    right breast, benign, calcifications; [facility]; Fibrosis    HX COLONOSCOPY  04/14/2015    Normal, follow up 10 yrs, Dr Haleigh Ray Left 08/2016    Dr Radha Mendieta [address]  [address] [place] [address] removed from right upper back    HX TOTAL LAPAROSCOPIC HYSTERECTOMY W/ BS&O  2015, Dr Latrice Cat    Total Lap Hysterectomy and BSO; Endometrial cancer; grade 1 endometrioid adenocarcinoma.; Dr. Jean Claude Aparicio, 23 yo         MEDICATIONS     Current Outpatient Medications   Medication Sig    methenamine hippurate (HIPREX) 1 gram tablet Take 1 g by mouth two (2) times daily (with meals). Per Urologist since summer 2021  Indications: urinary tract infection prevention    cranberry fruit extract (THERACRAN PO) Take 1 Tablet by mouth.  ibuprofen (AdviL) 200 mg tablet Take 400 mg by mouth daily.  atorvastatin (LIPITOR) 10 mg tablet Take 1 Tablet by mouth daily. [address] NOW. PAST DUE FASTING FOLLOW UP    buPROPion XL (WELLBUTRIN XL) 300 mg XL tablet TAKE 1 TABLET BY MOUTH EVERY DAY IN THE MORNING    montelukast (SINGULAIR) 10 mg tablet TAKE 1 TABLET BY MOUTH EVERY DAY    albuterol sulfate (ProAir RespiClick) 90 mcg/actuation aepb Take 2 Puffs by inhalation every six (6) hours as needed for Wheezing or Cough.  multivitamin (ONE A DAY) tablet Take 1 Tab by mouth daily. Includes Vitamin D 1,000 units    cetirizine (ZYRTEC) 10 mg tablet Take 10 mg by mouth daily. Indications: Non-Seasonal Allergic Runny Nose     No current facility-administered medications for this visit.           ALLERGIES     Allergies   Allergen Reactions    Sulfa (Sulfonamide Antibiotics) Other (comments)     Causes migraines    Tramadol Itching          SOCIAL HISTORY     Social History     Tobacco Use    Smoking status: Former Smoker     Quit date: 1985     Years since quittin.0    Smokeless tobacco: Never Used    Tobacco comment: used to be a light smoker x 3 yrs   Substance Use Topics    Alcohol use: Yes     Comment: very seldom, maybe 1-2 glasses of wine or beer a month at most     Social History     Social History Narrative    , 3 children (2 daughters, 1 son) (youngest daughter in [place], older in [place], son local)     at [employer], Circulation desk    Enjoys knitting and sewing daily        Diet: nothing special, admits her eating habits could be better    Caffeine: 1 cup of coffee qam, no tea or sodas    Exercise: walks 2-3 x a week x 30 minutes    Weight: 220's-230's over the years         Social History     Substance and Sexual Activity   Sexual Activity Yes    Partners: Male    Comment: Postmenopause       IMMUNIZATIONS     Immunization History   Administered Date(s) Administered    COVID-19, Pfizer Purple top, DILUTE for use, 12+ yrs, 30mcg/0.3mL dose 2021, 2021, 10/10/2021    Influenza Vaccine 2012, 2013    Influenza Vaccine (Quad) PF (>6 Mo Flulaval, Fluarix, and >3 Yrs Afluria, Fluzone [id number]) 2017, 10/08/2020, 2022    TDAP Vaccine 2006    Td 2016    Tdap 2020         FAMILY HISTORY     Family History   Problem Relation Age of Onset    Kidney Disease Mother         kidney transplant age 75yo ;  renal failure age 80    Diabetes Mother 36    High Cholesterol Mother     OSTEOARTHRITIS Mother         B knee replacements in her 63's    Heart Disease Father     Diabetes Father     Dementia Father         Alzheimer's;  after fall and hip fx    Heart Surgery Father         CABG in his 63's   Jeaneen [place] Arthritis Father         hand arthritis and Dupytrens Contractures    No Known Problems Son     Allergic Rhinitis Daughter     No Known Problems Daughter     Hypertension Brother     Hypertension Brother     Diabetes Brother         mild, controlling w/ diet    Diabetes Maternal Grandfather     Heart Attack Maternal Grandfather 62         MI          VITALS     Visit Vitals  /84 (BP 1 Location: Left upper arm, BP Patient Position: Sitting, BP Cuff Size: Large adult)   Pulse 80   Temp 98.1 °F (36.7 °C) (Oral)   Resp 16   Ht 5' 9\" (1.753 m)   Wt 224 lb 6.4 oz (101.8 kg)   LMP 05/09/2011 (Within Years)   SpO2 97%   BMI 33.14 kg/m²          PHYSICAL EXAMINATION   Physical Exam  Vitals reviewed. Constitutional:       General: She is not in acute distress. Appearance: Normal appearance. Eyes:      Conjunctiva/sclera: Conjunctivae normal.   Neck:      Vascular: No carotid bruit. Cardiovascular:      Rate and Rhythm: Normal rate and regular rhythm. Heart sounds: Normal heart sounds. Pulmonary:      Effort: Pulmonary effort is normal.      Breath sounds: Normal breath sounds. Abdominal:      General: There is no distension. Palpations: Abdomen is soft. There is no mass. Tenderness: There is no abdominal tenderness. Musculoskeletal:         General: No tenderness. Cervical back: Neck supple. Right lower leg: No edema. Left lower leg: No edema. Comments: Heberden's Nodes DIP joints B 3rd fingers. Mild tenderness DIP and PIP joints 3rd and 5th fingers. Mild warmth. No erythema or effusions. 2 small right palmar nodules w/ mild contracture. Partial  B, R>L. Normal pulses and sensation intact. Skin:     General: Skin is warm and dry. Neurological:      General: No focal deficit present. Mental Status: She is alert and oriented to person, place, and time. Psychiatric:         Mood and Affect: Mood and affect normal.            ASSESSMENT & PLAN   Diagnoses and all orders for this visit:    1. Mixed hyperlipidemia maintained on statin therapy  -     METABOLIC PANEL, COMPREHENSIVE; Future  -     LIPID PANEL; Future  -     TSH 3RD GENERATION; Future    2. Prediabetes  -     METABOLIC PANEL, COMPREHENSIVE; Future  -     HEMOGLOBIN A1C WITH EAG; Future    3. BMI 64.3-55.5,AGC  -     METABOLIC PANEL, COMPREHENSIVE; Future  -     LIPID PANEL; Future  -     TSH 3RD GENERATION; Future  -     HEMOGLOBIN A1C WITH EAG; Future    4. Arthritis of both hands  -     CBC W/O DIFF; Future  -     RHEUMATOID FACTOR, QL  -     SED RATE (ESR);  Future  - REFERRAL TO HAND ORTHOPEDICS    5. Dupuytren's contracture of right hand  -     REFERRAL TO HAND ORTHOPEDICS    6. Chronic depression, controlled, stable on current regimen of Wellbutrin    7. Encounter for immunization  -     NM IMMUNIZ ADMIN,1 SINGLE/COMB VAC/TOXOID  -     INFLUENZA VIRUS VAC QUAD,SPLIT,PRESV FREE SYRINGE IM    8. Needs flu shot  -     NM IMMUNIZ ADMIN,1 SINGLE/COMB VAC/TOXOID  -     INFLUENZA VIRUS VAC QUAD,SPLIT,PRESV FREE SYRINGE IM    Fasting labs checked today  Cardiovascular risk and specific lipid/LDL/A1c goals assessed  Reviewed diet, nutrition, exercise, weight management, BMI/goals. Age/risk based screening recommendations, health maintenance & prevention counseling. Cancer screening USPTFS guidelines reviewed w/ pt today. Discussed benefits/positive/negative outcomes of screening based on age/risk stratification. Informed consent for/against screening based on pt's personal hx/risk factors. Updated in history above and health maintenance. Reviewed medications and side effects, doing well on current regimen. No changes at this time  Further follow up & other recommendations pending review of labs.

## 2022-01-26 NOTE — PROGRESS NOTES
Chief Complaint   Patient presents with    Cholesterol Problem     fasting    Blood sugar problem     1. \"Have you been to the ER, urgent care clinic since your last visit? Hospitalized since your last visit? \" No    2. \"Have you seen or consulted any other health care providers outside of the [address] [place] [address] since your last visit? \" No     3. For patients over 45: Has the patient had a colonoscopy? 2015 Dr Tovar Scarce    If the patient is female:    4. For patients over 40: Has the patient had a mammogram? [facility] in July    5. For patients over 21: Has the patient had a pap smear?  Dr Rosibel Steven in July

## 2022-01-26 NOTE — PATIENT INSTRUCTIONS
Dupuytren's Contracture: Care Instructions  Your Care Instructions     In Dupuytren's contracture, the fingers become stiff and curl toward the palm. It is caused by thick tissue that grows under the skin in the palm of the hand. Sometimes the condition affects the palm but not the fingers. If the tissue gets thicker and affects one or more fingers, it may limit movement of your fingers and hand. Sometimes the condition can occur in the soles of the feet. The cause of Dupuytren's disease is not known. Dupuytren's disease may get worse slowly. If you have mild Dupuytren's disease, you may be able to keep your fingers moving with regular stretching. Surgery usually helps in severe cases. However, Dupuytren's disease can come back. Follow-up care is a key part of your treatment and safety. Be sure to make and go to all appointments, and call your doctor if you are having problems. It's also a good idea to know your test results and keep a list of the medicines you take. How can you care for yourself at home? · Follow your doctor's advice for physical or occupational therapy and exercises to put your fingers and hand through a range of motion. · Two times a day, massage your hand and gently stretch the fingers back. This can get rid of tightness and help keep your fingers flexible. · Try to avoid curling your hand tightly. For example, use utensils and tools that have larger hand . When should you call for help? Call your doctor now or seek immediate medical care if:    · You have numbness in your fingers.     · You have a wound or sore on your finger or palm.     · Your hand or fingers get worse. Watch closely for changes in your health, and be sure to contact your doctor if you have any problems. Where can you learn more? Go to http://www.gray.com/  Enter T888 in the search box to learn more about \"Dupuytren's Contracture: Care Instructions. \"  Current as of: July 1, 2021               Content Version: 13.0  © 2006-2021 [employer]. Care instructions adapted under license by [employer] (which disclaims liability or warranty for this information). If you have questions about a medical condition or this instruction, always ask your healthcare professional. [address] any warranty or liability for your use of this information. Hand Arthritis: Exercises  Introduction  Here are some examples of exercises for you to try. The exercises may be suggested for a condition or for rehabilitation. Start each exercise slowly. Ease off the exercises if you start to have pain. You will be told when to start these exercises and which ones will work best for you. How to do the exercises  Tendon glides    1. In this exercise, the steps follow one another to a make a continuous movement. 2. With your affected hand, point your fingers and thumb straight up. Your wrist should be relaxed, following the line of your fingers and thumb. 3. Curl your fingers so that the top two joints in them are bent, and your fingers wrap down. Your fingertips should touch or be near the base of your fingers. Your fingers will look like a hook. 4. Make a fist by bending your knuckles. Your thumb can gently rest against your index (pointing) finger. 5. Unwind your fingers slightly so that your fingertips can touch the base of your palm. Your thumb can rest against your index finger. 6. Move back to your starting position, with your fingers and thumb pointing up. 7. Repeat the series of motions 8 to 12 times. 8. Switch hands and repeat steps 1 through 6, even if only one hand is sore. Intrinsic flexion    1. Rest your affected hand on a table and bend the large joints where your fingers connect to your hand. Keep your thumb and the other joints in your fingers straight. 2. Slowly straighten your fingers.  Your wrist should be relaxed, following the line of your fingers and thumb. 3. Move back to your starting position, with your hand bent. 4. Repeat 8 to 12 times. 5. Switch hands and repeat steps 1 through 4, even if only one hand is sore. Finger extension    1. Place your affected hand flat on a table. 2. Lift and then lower one finger at a time off the table. 3. Repeat 8 to 12 times. 4. Switch hands and repeat steps 1 through 3, even if only one hand is sore. MP extension    1. Place your good hand on a table, palm up. Put your affected hand on top of your good hand with your fingers wrapped around the thumb of your good hand like you are making a fist.  2. Slowly uncurl the joints of your affected hand where your fingers connect to your hand so that only the top two joints of your fingers are bent. Your fingers will look like a hook. 3. Move back to your starting position, with your fingers wrapped around your good thumb. 4. Repeat 8 to 12 times. 5. Switch hands and repeat steps 1 through 4, even if only one hand is sore. PIP extension (with MP extension)    1. Place your good hand on a table, palm up. Put your affected hand on top of your good hand, palm up. 2. Use the thumb and fingers of your good hand to grasp below the middle joint of one finger of your affected hand. 3. Straighten the last two joints of that finger. 4. Repeat 8 to 12 times. 5. Repeat steps 1 through 4 with each finger. 6. Switch hands and repeat steps 1 through 5, even if only one hand is sore. DIP flexion    1. With your good hand, grasp one finger of your affected hand. Your thumb will be on the top side of your finger just below the joint that is closest to your fingernail. 2. Slowly bend your affected finger only at the joint closest to your fingernail. 3. Repeat 8 to 12 times. 4. Repeat steps 1 through 3 with each finger. 5. Switch hands and repeat steps 1 through 4, even if only one hand is sore. Follow-up care is a key part of your treatment and safety.  Be sure to make and go to all appointments, and call your doctor if you are having problems. It's also a good idea to know your test results and keep a list of the medicines you take. Where can you learn more? Go to http://www.gray.com/  Enter E040 in the search box to learn more about \"Hand Arthritis: Exercises. \"  Current as of: July 1, 2021               Content Version: 13.0  © 2006-2021 [employer]. Care instructions adapted under license by [employer] (which disclaims liability or warranty for this information). If you have questions about a medical condition or this instruction, always ask your healthcare professional. [address] any warranty or liability for your use of this information.

## 2022-01-27 ENCOUNTER — PATIENT MESSAGE (OUTPATIENT)
Dept: FAMILY MEDICINE CLINIC | Age: 62
End: 2022-01-27

## 2022-01-27 DIAGNOSIS — F32.A CHRONIC DEPRESSION: ICD-10-CM

## 2022-01-27 LAB
ALBUMIN SERPL-MCNC: 4.8 G/DL (ref 3.8–4.8)
ALBUMIN/GLOB SERPL: 2.3 {RATIO} (ref 1.2–2.2)
ALP SERPL-CCNC: 94 IU/L (ref 44–121)
ALT SERPL-CCNC: 30 IU/L (ref 0–32)
AST SERPL-CCNC: 26 IU/L (ref 0–40)
BILIRUB SERPL-MCNC: 0.3 MG/DL (ref 0–1.2)
BUN SERPL-MCNC: 15 MG/DL (ref 8–27)
BUN/CREAT SERPL: 17 (ref 12–28)
CALCIUM SERPL-MCNC: 9.7 MG/DL (ref 8.7–10.3)
CHLORIDE SERPL-SCNC: 101 MMOL/L (ref 96–106)
CHOLEST SERPL-MCNC: 203 MG/DL (ref 100–199)
CO2 SERPL-SCNC: 28 MMOL/L (ref 20–29)
CREAT SERPL-MCNC: 0.87 MG/DL (ref 0.57–1)
ERYTHROCYTE [DISTWIDTH] IN BLOOD BY AUTOMATED COUNT: 12.6 % (ref 11.7–15.4)
ERYTHROCYTE [SEDIMENTATION RATE] IN BLOOD BY WESTERGREN METHOD: 3 MM/HR (ref 0–40)
EST. AVERAGE GLUCOSE BLD GHB EST-MCNC: 126 MG/DL
GLOBULIN SER CALC-MCNC: 2.1 G/DL (ref 1.5–4.5)
GLUCOSE SERPL-MCNC: 111 MG/DL (ref 65–99)
HBA1C MFR BLD: 6 % (ref 4.8–5.6)
HCT VFR BLD AUTO: 41.9 % (ref 34–46.6)
HDLC SERPL-MCNC: 50 MG/DL
HGB BLD-MCNC: 13.9 G/DL (ref 11.1–15.9)
IMP & REVIEW OF LAB RESULTS: NORMAL
LDLC SERPL CALC-MCNC: 132 MG/DL (ref 0–99)
MCH RBC QN AUTO: 29.4 PG (ref 26.6–33)
MCHC RBC AUTO-ENTMCNC: 33.2 G/DL (ref 31.5–35.7)
MCV RBC AUTO: 89 FL (ref 79–97)
PLATELET # BLD AUTO: 366 X10E3/UL (ref 150–450)
POTASSIUM SERPL-SCNC: 4.9 MMOL/L (ref 3.5–5.2)
PROT SERPL-MCNC: 6.9 G/DL (ref 6–8.5)
RBC # BLD AUTO: 4.73 X10E6/UL (ref 3.77–5.28)
RHEUMATOID FACT SERPL-ACNC: <10 IU/ML (ref 0–15)
SODIUM SERPL-SCNC: 141 MMOL/L (ref 134–144)
TRIGL SERPL-MCNC: 118 MG/DL (ref 0–149)
TSH SERPL-ACNC: 2.91 UIU/ML (ref 0.45–4.5)
VLDLC SERPL CALC-MCNC: 21 MG/DL (ref 5–40)
WBC # BLD AUTO: 5.9 X10E3/UL (ref 3.4–10.8)

## 2022-01-27 RX ORDER — BUPROPION HYDROCHLORIDE 300 MG/1
300 TABLET ORAL DAILY
Qty: 90 TABLET | Refills: 3 | Status: SHIPPED | OUTPATIENT
Start: 2022-01-27

## 2022-01-27 NOTE — TELEPHONE ENCOUNTER
PCP: Bethanie Ring MD    Last appt: 1/26/2022  No future appointments. Requested Prescriptions     Pending Prescriptions Disp Refills    buPROPion XL (WELLBUTRIN XL) 300 mg XL tablet 30 Tablet 0     Sig: Take 1 Tablet by mouth daily.          Prior labs and Blood pressures:  BP Readings from Last 3 Encounters:   01/26/22 132/84   10/08/20 120/79   03/31/20 159/85     Lab Results   Component Value Date/Time    Sodium 141 01/26/2022 10:35 AM    Potassium 4.9 01/26/2022 10:35 AM    Chloride 101 01/26/2022 10:35 AM    CO2 28 01/26/2022 10:35 AM    Glucose 111 (H) 01/26/2022 10:35 AM    BUN 15 01/26/2022 10:35 AM    Creatinine 0.87 01/26/2022 10:35 AM    BUN/Creatinine ratio 17 01/26/2022 10:35 AM    GFR est AA 83 01/26/2022 10:35 AM    GFR est non-AA 72 01/26/2022 10:35 AM    Calcium 9.7 01/26/2022 10:35 AM     Lab Results   Component Value Date/Time    Hemoglobin A1c 6.0 (H) 01/26/2022 10:35 AM     Lab Results   Component Value Date/Time    Cholesterol, total 203 (H) 01/26/2022 10:35 AM    HDL Cholesterol 50 01/26/2022 10:35 AM    LDL, calculated 132 (H) 01/26/2022 10:35 AM    LDL, calculated 113 (H) 08/26/2019 08:48 AM    VLDL, calculated 21 01/26/2022 10:35 AM    VLDL, calculated 30 08/26/2019 08:48 AM    Triglyceride 118 01/26/2022 10:35 AM     Lab Results   Component Value Date/Time    VITAMIN D, 25-HYDROXY 56.3 09/22/2020 11:01 AM       Lab Results   Component Value Date/Time    TSH 2.910 01/26/2022 10:35 AM

## 2022-03-18 PROBLEM — J45.20 MILD INTERMITTENT ASTHMA WITHOUT COMPLICATION: Status: ACTIVE | Noted: 2019-05-09

## 2022-03-18 PROBLEM — R73.03 PREDIABETES: Status: ACTIVE | Noted: 2020-09-12

## 2022-03-18 PROBLEM — J45.40 MODERATE PERSISTENT ASTHMA WITHOUT COMPLICATION: Status: ACTIVE | Noted: 2020-03-21

## 2022-03-19 PROBLEM — J30.2 PERENNIAL ALLERGIC RHINITIS WITH SEASONAL VARIATION: Status: ACTIVE | Noted: 2019-05-09

## 2022-03-19 PROBLEM — R31.0 GROSS HEMATURIA: Status: ACTIVE | Noted: 2017-11-30

## 2022-03-19 PROBLEM — Z85.42 HISTORY OF ENDOMETRIAL CANCER: Status: ACTIVE | Noted: 2020-04-01

## 2022-03-19 PROBLEM — J30.89 PERENNIAL ALLERGIC RHINITIS WITH SEASONAL VARIATION: Status: ACTIVE | Noted: 2019-05-09

## 2022-03-20 PROBLEM — E78.2 MIXED HYPERLIPIDEMIA: Status: ACTIVE | Noted: 2019-08-26

## 2022-03-20 PROBLEM — S82.892A CLOSED FRACTURE OF LEFT ANKLE: Status: ACTIVE | Noted: 2019-08-26

## 2022-04-08 LAB — SARS-COV-2, NAA: NOT DETECTED

## 2022-05-06 ENCOUNTER — TELEPHONE (OUTPATIENT)
Dept: FAMILY MEDICINE CLINIC | Age: 62
End: 2022-05-06

## 2022-05-06 LAB — SARS-COV-2, NAA: POSITIVE

## 2022-05-06 NOTE — TELEPHONE ENCOUNTER
Patient said she is returning call for nurse [facility].     Lorene Ramsey \"Nora\" (Self) [phone]

## 2022-05-06 NOTE — TELEPHONE ENCOUNTER
Patient called and stated that she tested positive for Covid this morning and has questions in regards to Infusion due to having other health issues    Requesting a call back    Best call back# [phone]

## 2022-05-18 ENCOUNTER — TELEPHONE (OUTPATIENT)
Dept: FAMILY MEDICINE CLINIC | Age: 62
End: 2022-05-18

## 2022-05-18 NOTE — TELEPHONE ENCOUNTER
Patient is calling  Because she wants to know when Dr. Campbell Bullock will start taking Covid patients    [facility]" (self) [phone]

## 2022-05-18 NOTE — TELEPHONE ENCOUNTER
[facility] for return call. Spoke to pt and she reports that she was dx with covid on 5/6. She is still sick, still running a fever off and on. . She has been to [facility] 2 times 5/6 and 5/16. She has positive hx of asthma. On 5/16 she was found to have a ruptured ear drum. She is scheduled to see ENT on Fri @ 7am for her ear. .  Work has sent her FMLA form and [facility] doesn't do them. Advised that I can get her booked with Dr Benedicto Estevez as a virtual visit on Fri. I will request records from [facility]. She will complete her portion of the FMLA form and will send it thru [facility] so Dr Benedicto Estevez will have it on Fri.

## 2022-05-20 ENCOUNTER — VIRTUAL VISIT (OUTPATIENT)
Dept: FAMILY MEDICINE CLINIC | Age: 62
End: 2022-05-20
Payer: COMMERCIAL

## 2022-05-20 DIAGNOSIS — H73.012 BULLOUS MYRINGITIS OF LEFT EAR: ICD-10-CM

## 2022-05-20 DIAGNOSIS — U07.1 UPPER RESPIRATORY TRACT INFECTION DUE TO COVID-19 VIRUS: Primary | ICD-10-CM

## 2022-05-20 DIAGNOSIS — J06.9 UPPER RESPIRATORY TRACT INFECTION DUE TO COVID-19 VIRUS: Primary | ICD-10-CM

## 2022-05-20 PROCEDURE — 99215 OFFICE O/P EST HI 40 MIN: CPT | Performed by: FAMILY MEDICINE

## 2022-05-20 NOTE — PROGRESS NOTES
VIRTUAL VISIT    Patient seen via virtual visit today for the following:  Chief Complaint   Patient presents with    Positive For Covid-19     Follow Up from [facility]    Cough    Nasal Congestion       HISTORY OF PRESENT ILLNESS   HPI  Patient seen via virtual visit today for follow up Covid. She had been around her son in law who had been exposed to Covid and 3 nights later on 5/5 patient started w/ symptoms of nasal sinus congestion, light headedness, dizziness, fever up to 100.5, and a dry cough. Her home O2 sats were staying at 94% and above. She took a home Covid test on 5/6 which was positive so she went to [facility]. She tested positive there as well. She was started on Tessalon Perles to use prn. She was taking Nyquil, Dayquil, and Advil prn as well. But she still wasn't feeling any better so she went back on 5/16. She was started on Doxycycline at that time. 4 hrs after her visit that day she developed severe left ear pain, decreased hearing and her left ear felt completely stopped up. So she ended up going back to [facility] again later that same day and was told her ear looked red and inflamed. Was told that it looked like it might rupture and to just use warm compresses and was prescribed Hydrocodone for pain. That night in the middle of the night, she felt a pop, release of pressure, relief of pain and experienced a copious amount of discolored drainage tinted w/ blood coming from her left ear. She saw ENT Dr. Brittani Corona at [facility] earlier this AM. She was told that her eardrum technically did not rupture but instead showed bullous myringitis. He started her on Cipro ear qtts and 2 different nasal sprays (Flonase & Astelin) all of which she will be starting today. She is still not hearing well and was told it could take up to a week. She follows up there in 4 weeks. She is still feeling congested and slightly light headed and run down.  But in general things are getting alittle better since symptom onset a few weeks ago. Feeling ~ 50% better. No more low grade fevers. Last fever was about ~ 4 days ago. Still having some cough w/ slight chest congestion and bronchial irritation. Cough is slightly productive the past 2 days, yellow green sputum. She has been having to use her Albuterol Inhaler q6hrs the past 3-4 days. At night taking Zyrtec, Nyquil and using her Albuterol. Occasionally taking Dayquil during the day. Taking Advil 400 mg bid and has not needed the Hydrocodone anymore since the first 2 days. No sore throat, sob, wheezing, pleuritic pain, chest pain, n/v. Aside from this current illness she seldom needs to use her Albuterol, maybe a few x a year at most. Prior to this she had not needed to use it > 1 yr. She states this is the first time she has been sick since 3/2020. She is vaccinated against Covid x 2 and got the 1st booster last fall. [employer] has had her out of work the past week. She went back to [employer] on Wed (2 days ago) to get a work excuse extension through today. She is scheduled to work today and tomorrow and is off on Sunday. She doesn't feel quite well enough to go back today and is requesting a work note for her weekend shifts today and tomorrow. Will return on Monday. REVIEW OF SYMPTOMS   Review of Systems   Constitutional: Negative for chills and fever. HENT: Positive for congestion. Negative for sore throat. Respiratory: Positive for cough. Negative for shortness of breath and wheezing. Cardiovascular: Negative. Gastrointestinal: Negative.             PROBLEM LIST/MEDICAL HISTORY     Problem List  Date Reviewed: 5/20/2022          Codes Class Noted    Prediabetes ICD-10-CM: R73.03  ICD-9-CM: 790.29  9/12/2020        History of endometrial cancer ICD-10-CM: Z85.42  ICD-9-CM: V10.42  4/1/2020    Overview Signed 1/22/2021 10:30 AM by Kayy Escudero MD     Grade 1 Endometrioid Carcinoma, S/P Total Lap Hysterectomy-BSO [id number]; [facility] Dr Mart Roblero, Dr Alejo Greco               Moderate persistent asthma without complication KLV-15-VM: C56.28  ICD-9-CM: 493.90  3/21/2020    Overview Signed 3/21/2020  6:55 PM by Akua Quezada MD     [id number]             Closed fracture of left ankle ICD-10-CM: I65.669X  ICD-9-CM: 824.8  8/26/2019    Overview Addendum 8/26/2019  8:35 AM by MD Nereida Ayala, Dr. Gabi Buckley 6-1-19, boot, no surgery             Mixed hyperlipidemia ICD-10-CM: E78.2  ICD-9-CM: 272.2  8/26/2019        Mild intermittent asthma without complication DBN-66-FK: L26.88  ICD-9-CM: 493.90  5/9/2019    Overview Signed 5/9/2019 12:22 PM by Akua Quezada MD     2-3 x a year triggered by peak allergy time or URI's             Perennial allergic rhinitis with seasonal variation ICD-10-CM: J30.89, J30.2  ICD-9-CM: 477.9  5/9/2019    Overview Signed 5/9/2019 12:27 PM by Akua Quezada MD     Peaks spring and fall             Gross hematuria ICD-10-CM: R31.0  ICD-9-CM: 599.71  11/30/2017        DDD (degenerative disc disease), cervical ICD-10-CM: M50.30  ICD-9-CM: 722.4  7/14/2016        Cervical spinal stenosis w/ right radiculopathy ICD-10-CM: M48.02  ICD-9-CM: 723.0  7/14/2016    Overview Signed 7/14/2016  8:43 AM by Akua Quezada MD     2016 Dr eTodoro Seo, PT             Screening exams for skin cancer ICD-10-CM: Z12.83  ICD-9-CM: V76.43  3/30/2016    Overview Signed 3/30/2016 11:22 AM by Akua Quezada MD     Dermatologist routine checkes             Avulsion fracture of right ankle ICD-10-CM: S82.899A  ICD-9-CM: 824.8  7/10/2015    Overview Signed 7/10/2015  9:02 AM by Akua Quezada MD     ~6-2015, [facility] Del Valle, boot             Baker's cyst ICD-10-CM: M71.20  ICD-9-CM: 727.51  12/9/2013    Overview Signed 12/9/2013 11:58 AM by Akua Quezada MD     Left knee; 12/6/13, Dr Regina Stern             Vitamin D deficiency ICD-10-CM: E55.9  ICD-9-CM: 268.9  12/23/2012        Seasonal allergic rhinitis ICD-10-CM: J30.2  ICD-9-CM: 477.9  12/6/2012    Overview Signed 12/6/2012  2:27 PM by Suzie Srinivasan MD     Spring and Fall worst             Pneumonia ICD-10-CM: J18.9  ICD-9-CM: 437  12/6/2012    Overview Signed 12/6/2012  2:28 PM by Suzie Srinivasan MD     Spring 2012, treated outpt, [facility]             History of depression ICD-10-CM: Z86.59  ICD-9-CM: V11.8  12/6/2012    Overview Addendum 6/26/2013  2:16 PM by Suzie Srinivasan MD     Treated sporadically over the years w/ Effexor or Celexa (better)             Chronic depression ICD-10-CM: F32. A  ICD-9-CM: 856  12/6/2012        H/O Recurrent Asthmatic Bronchitis ICD-10-CM: J45.909  ICD-9-CM: 493.90  12/6/2012    Overview Signed 12/6/2012  2:38 PM by Suzie Srinivasan MD     Since ~ 2006             OA (osteoarthritis) of left knee ICD-10-CM: M17.10  ICD-9-CM: 715.36  12/6/2012    Overview Addendum 3/25/2015 12:00 PM by Suzie Srinivasan MD     Xrays 2011; Ortho Dr Nirmala Mercer, 12/2013, xrayed again, advanced DJD/OA ; CSI 12/2013, 3/2014, 6/2014, 12/2014                       PAST SURGICAL HISTORY     Past Surgical History:   Procedure Laterality Date    HX BREAST BIOPSY  12/17/12    right breast, benign, calcifications; [facility]; Fibrosis    HX COLONOSCOPY  04/14/2015    Normal, follow up 10 yrs, Dr Woodrow Yeager Left 08/2016    Dr Wm Cortez    [address]  2014    [facility] removed from right upper back    HX TOTAL LAPAROSCOPIC HYSTERECTOMY W/ BS&O  9/24/2015, Dr Simón Paredes    Total Lap Hysterectomy and BSO; Endometrial cancer; grade 1 endometrioid adenocarcinoma.; Dr. Jeffrey Harris, 23 yo         MEDICATIONS     Current Outpatient Medications   Medication Sig    cholecalciferol, vitamin D3, (VITAMIN D3 PO) Take  by mouth daily.     montelukast (SINGULAIR) 10 mg tablet TAKE 1 TABLET BY MOUTH EVERY DAY    atorvastatin (LIPITOR) 10 mg tablet TAKE 1 TABLET BY MOUTH DAILY.  buPROPion XL (WELLBUTRIN XL) 300 mg XL tablet Take 1 Tablet by mouth daily.  cranberry fruit extract (THERACRAN PO) Take 1 Tablet by mouth.  ibuprofen (AdviL) 200 mg tablet Take 400 mg by mouth two (2) times a day.  albuterol sulfate (ProAir RespiClick) 90 mcg/actuation aepb Take 2 Puffs by inhalation every six (6) hours as needed for Wheezing or Cough.  multivitamin (ONE A DAY) tablet Take 1 Tab by mouth daily. Includes Vitamin D 1,000 units    cetirizine (ZYRTEC) 10 mg tablet Take 10 mg by mouth daily. Indications: Non-Seasonal Allergic Runny Nose     No current facility-administered medications for this visit.           ALLERGIES     Allergies   Allergen Reactions    Sulfa (Sulfonamide Antibiotics) Other (comments)     Causes migraines    Tramadol Itching          SOCIAL HISTORY     Social History     Tobacco Use    Smoking status: Former Smoker     Quit date: 1985     Years since quittin.4    Smokeless tobacco: Never Used    Tobacco comment: used to be a light smoker x 3 yrs   Substance Use Topics    Alcohol use: Yes     Comment: very seldom, maybe 1-2 glasses of wine or beer a month at most     Social History     Social History Narrative    , 3 children (2 daughters, 1 son) (youngest daughter in [place], older in [facility], son local)     at [employer], Circulation desk    Enjoys knitting and sewing daily        Diet: nothing special, admits her eating habits could be better    Caffeine: 1 cup of coffee qam, no tea or sodas    Exercise: walks 2-3 x a week x 30 minutes    Weight: 220's-230's over the years         Social History     Substance and Sexual Activity   Sexual Activity Yes    Partners: Male    Comment: Postmenopause       IMMUNIZATIONS     Immunization History   Administered Date(s) Administered    COVID-19, Pfizer Purple top, DILUTE for use, 12+ yrs, 30mcg/0.3mL dose 2021, 2021, 10/10/2021    Influenza Vaccine 2012, 2013    Influenza Vaccine (Quad) PF (>6 Mo Flulaval, Fluarix, and >3 Yrs Afluria, Fluzone [id number]) 2017, 10/08/2020, 2022    TDAP Vaccine 2006    Td 2016    Tdap 2020         FAMILY HISTORY     Family History   Problem Relation Age of Onset    Kidney Disease Mother         kidney transplant age 77yo ;  renal failure age 80    Diabetes Mother 36    High Cholesterol Mother     OSTEOARTHRITIS Mother         B knee replacements in her 63's    Heart Disease Father     Diabetes Father     Dementia Father         Alzheimer's;  after fall and hip fx    Heart Surgery Father         CABG in his 63's   [facility] Arthritis Father         hand arthritis and Dupytrens Contractures    No Known Problems Son     Allergic Rhinitis Daughter     No Known Problems Daughter     Hypertension Brother     Hypertension Brother     Diabetes Brother         mild, controlling w/ diet    Diabetes Maternal Grandfather     Heart Attack Maternal Grandfather 62         MI          VITALS   There were no vitals available for today's virtual visit. Any patient self reported vitals are noted below:  Patient-Reported Vitals 2022   Patient-Reported Weight 220   Patient-Reported Height 59   Patient-Reported Pulse 85   Patient-Reported Temperature 97.8   Patient-Reported SpO2 96%   Patient-Reported Systolic  655   Patient-Reported Diastolic 78         PHYSICAL EXAMINATION   Physical Exam  Vitals reviewed. Constitutional:       General: She is not in acute distress. Appearance: She is not toxic-appearing. Pulmonary:      Effort: Pulmonary effort is normal. No respiratory distress. Neurological:      Mental Status: She is alert. ASSESSMENT & PLAN   Diagnoses and all orders for this visit:    1.  Upper respiratory tract infection due to COVID-19 virus  Patient is on day 5 of 7 of Doxycycline 100 mg bid  Start Mucinex D 12 hr bid  Start Vitamin C 1000 mg daily, Zinc 50 mg daily, and continue Vitamin D3 at least 1000 units daily  She has Tessalon Perles to use tid prn  Continue Albuterol HFA q6hr prn   Work excuse extended for 5/20 and 5/21, note routed to patient in her portal today  Follow up if does not continue to improve along expectant course or sooner if anything worsens in the interim  Reviewed medications, effects, and possible side effects   Discussed supportive home care measures including adequate rest/hydration/nutrition  Proceed to ER for onset of chest pain, pleuritic pain, hemoptysis, sob, wheezing or any other acute changes or other significant concerns. Follow most up to date CDC Covid vaccination recommendations, safety guidelines, preventive measures, mask wearing recommendations, contact precautions, and social distancing/quarantine guidelines. 2. Bullous myringitis of left ear  Saw ENT Dr. Martha Cai at [facility] earlier this AM.  He started her on Cipro ear qtts, Flonase & Astelin nasal sprays all of which she will be starting today. She follows up there in 4 weeks. Patient was evaluated through a synchronous (real-time) audio-video encounter. The patient (or guardian if applicable) is aware that this is a billable service. Verbal consent to proceed has been obtained within the past 12 months. The visit was conducted pursuant to the emergency declaration under the [facility], [address] [place] [address] authority and the [employer] Act. Patient identification was verified, and a caregiver was present when appropriate. The patient was located in a state where the provider was credentialed to provide care. Total Time: minutes: 40. An electronic signature was used to authenticate this note.   ~Padmini Whalen MD~

## 2022-05-20 NOTE — LETTER
NOTIFICATION FOR  WORK    5/20/2022 11:15 AM    RE: Merline Outlaw Schnizler      To Whom It May Concern:    Kinjal Rome is currently under the care of MEKA Gaspar 52. Please excuse her from work 5/20/22 & 5/21/22 due to illness. If there are questions or concerns please have the patient contact our office.         Sincerely,      Layo López MD

## 2022-05-20 NOTE — PROGRESS NOTES
Son in law had been exposed to Covid and 3 nights later on 5/5 patient started w/ symptoms of nasal sinus congestion, light headed, dizzy, fever 100.5, dry cough but her sats were staying at 94% and above. She tested + on 5/6. She was taking Nyquil, Dayquil, Advil. But still wasn't feeling any better. So she went to [facility] that day. She was started on Tessalon Perles to use prn. She still was not feeling any better so she went back onn 5/16. She was started on Doxycycline at that time. 4 hrs after her visit that day she developed severe left ear pain, decreased hearing and felt stopped up. Went back to [facility] same day and was told her ear looked red and inflamed. Was told that it looked like it might rupture and to just used warm compresses and was prescribed Hydrocodone for pain. That night in the middle of the night, she felt a pop, pressure relief and discolored drainage tinted w/ blood. Drained copious fluid. Still feeling congested and slightly light headed  She saw ENT Dr. Jumana Kate at [facility] this AM  She was told that her eardrum technically did not rupture but instead showed bullous myringitis  He started her on Cipro ear qtts and 2 different nasal sprays (Flonase & Astelin)    Still not hearing well and was told it could take up to a week  She follows up there in 4 weeks  Things are improving, feeling ~ 50% better    No more low grade fevers. Last fever was about ~ 4 days ago. Still having some cough w/ slight chest congestion and bronchial irritation. Cough is slightly productive the past 2 days, yellow green sputum.   She has been having to use her Albuterol Inhaler q6hrs the past 3-4 days  At night taking Zyrtec, Nyquil and using her Albuterol   Occasionally taking Dayquil during the day  Taking Advil 400 mg bid and has not needed the Hydrocodone anymore since the first 2 days  No sore throat       She went back to [facility] on Wed to get a work excuse extension through today    Aside from this current illness she seldom needs to use her Albuterol, maybe a few x a year at most. Prior to this she had not needed to use it > 1 yr.   She has been well since 3-2020  Work note today and tomorrow    40\"

## 2022-05-20 NOTE — TELEPHONE ENCOUNTER
I saw pt for her VV today. She states her employer told her she doesn't need to do FMLA if I excuse her the next few days, just a note. So I sent her the work note through her patient portal right after her visit today.

## 2022-07-02 DIAGNOSIS — E78.2 MIXED HYPERLIPIDEMIA: ICD-10-CM

## 2022-07-02 RX ORDER — ATORVASTATIN CALCIUM 10 MG/1
TABLET, FILM COATED ORAL
Qty: 90 TABLET | Refills: 0 | Status: SHIPPED | OUTPATIENT
Start: 2022-07-02 | End: 2022-09-27

## 2022-09-27 DIAGNOSIS — E78.2 MIXED HYPERLIPIDEMIA: ICD-10-CM

## 2022-09-27 RX ORDER — ATORVASTATIN CALCIUM 10 MG/1
TABLET, FILM COATED ORAL
Qty: 90 TABLET | Refills: 0 | Status: SHIPPED | OUTPATIENT
Start: 2022-09-27

## 2022-09-28 NOTE — TELEPHONE ENCOUNTER
I sent in rx. She is past due fasting follow up and labs. After her last labs her cholesterol numbers were up and she admitted to not taking the Lipitor consistently. She should be back on it routinely now and needs a fasting follow up in office OV in the next few months. Please get booked as soon as possible. Thanks.

## 2022-10-12 NOTE — TELEPHONE ENCOUNTER
THOMASM for return call to set up appt. I also let her know that I would send [employer] message and that she can respond thru there.

## 2022-11-22 ENCOUNTER — OFFICE VISIT (OUTPATIENT)
Dept: FAMILY MEDICINE CLINIC | Age: 62
End: 2022-11-22
Payer: COMMERCIAL

## 2022-11-22 VITALS
BODY MASS INDEX: 34.57 KG/M2 | TEMPERATURE: 98.2 F | RESPIRATION RATE: 16 BRPM | HEART RATE: 87 BPM | DIASTOLIC BLOOD PRESSURE: 82 MMHG | SYSTOLIC BLOOD PRESSURE: 128 MMHG | WEIGHT: 233.4 LBS | HEIGHT: 69 IN | OXYGEN SATURATION: 97 %

## 2022-11-22 DIAGNOSIS — E78.2 MIXED HYPERLIPIDEMIA: ICD-10-CM

## 2022-11-22 DIAGNOSIS — R73.03 PREDIABETES: ICD-10-CM

## 2022-11-22 DIAGNOSIS — Z00.00 ROUTINE GENERAL MEDICAL EXAMINATION AT A HEALTH CARE FACILITY: Primary | ICD-10-CM

## 2022-11-22 PROCEDURE — 99396 PREV VISIT EST AGE 40-64: CPT | Performed by: FAMILY MEDICINE

## 2022-11-22 RX ORDER — MELOXICAM 7.5 MG/1
7.5 TABLET ORAL DAILY
COMMUNITY
Start: 2022-10-30

## 2022-11-22 NOTE — PROGRESS NOTES
Chief Complaint   Patient presents with    Complete Physical     Fasting    Pre-diabetes    Cholesterol Problem       HISTORY OF PRESENT ILLNESS   Annual CPE  Fasting follow up hyperlipidemia and prediabetes  Complying routinely w/ medication regimen updated below and tolerating w/o reaction or side effects  Overall getting along well and feeling well in general  No complaints or concerns at this time  Diet: nothing special, admits her eating habits are not good  Caffeine: 1 cup of coffee qam, no tea or sodas  Exercise: nothing since the summer, was walking 2-3 x a week x 30 minutes; stays active and tries to get in at least 8-10 k steps per day; plans to join [employer] so she can start swimming again  Weight: 220's-230's over the years     REVIEW OF SYMPTOMS   Review of Systems   Constitutional: Negative. HENT: Negative. Eyes: Negative. Respiratory: Negative. Cardiovascular: Negative. Gastrointestinal: Negative. Genitourinary: Negative. Musculoskeletal:  Negative for myalgias. Neurological: Negative. Endo/Heme/Allergies: Negative. Psychiatric/Behavioral: Negative.            PROBLEM LIST/MEDICAL HISTORY     Problem List  Date Reviewed: 11/22/2022            Codes Class Noted    Prediabetes ICD-10-CM: R73.03  ICD-9-CM: 790.29  9/12/2020        History of endometrial cancer ICD-10-CM: Z85.42  ICD-9-CM: V10.42  4/1/2020    Overview Signed 1/22/2021 10:30 AM by Nereyda Phipps MD     Grade 1 Endometrioid Carcinoma, S/P Total Lap Hysterectomy-BSO 9-2015; [place] [place], Dr Jose Roman               Moderate persistent asthma without complication RDR-63-DD: P44.74  ICD-9-CM: 493.90  3/21/2020    Overview Signed 3/21/2020  6:55 PM by Nereyda Phipps MD     [id number]             Closed fracture of left ankle ICD-10-CM: C47.942C  ICD-9-CM: 824.8  8/26/2019    Overview Addendum 8/26/2019  8:35 AM by Nereyda Phipps MD     [facility], Dr. Ted Pollock 6-1-19, boot, no surgery             Mixed hyperlipidemia ICD-10-CM: E78.2  ICD-9-CM: 272.2  8/26/2019        Mild intermittent asthma without complication [id number]-38-ZE: H52.26  ICD-9-CM: 493.90  5/9/2019    Overview Signed 5/9/2019 12:22 PM by Sara Neumann MD     2-3 x a year triggered by peak allergy time or URI's             Perennial allergic rhinitis with seasonal variation ICD-10-CM: J30.89, J30.2  ICD-9-CM: 477.9  5/9/2019    Overview Signed 5/9/2019 12:27 PM by Sara Neumann MD     Peaks spring and fall             Gross hematuria ICD-10-CM: R31.0  ICD-9-CM: 599.71  11/30/2017        DDD (degenerative disc disease), cervical ICD-10-CM: M50.30  ICD-9-CM: 722.4  7/14/2016        Cervical spinal stenosis w/ right radiculopathy ICD-10-CM: M48.02  ICD-9-CM: 723.0  7/14/2016    Overview Signed 7/14/2016  8:43 AM by Sara Neumann MD     2016 Dr Anna Garcia, PT             Screening exams for skin cancer ICD-10-CM: Z12.83  ICD-9-CM: V76.43  3/30/2016    Overview Signed 3/30/2016 11:22 AM by Sara Neumann MD     Dermatologist routine checkes             Avulsion fracture of right ankle ICD-10-CM: S82.899A  ICD-9-CM: 824.8  7/10/2015    Overview Signed 7/10/2015  9:02 AM by Sara Neumann MD     ~6-2015, ashwin Naqvi             Baker's cyst ICD-10-CM: M71.20  ICD-9-CM: 727.51  12/9/2013    Overview Signed 12/9/2013 11:58 AM by Sara Neumann MD     Left knee; 12/6/13, Dr Britta Teague             Vitamin D deficiency ICD-10-CM: E55.9  ICD-9-CM: 268.9  12/23/2012        Postmenopause, LMP 46 yo, No HRT ICD-10-CM: Z78.0  ICD-9-CM: V49.81  12/6/2012        Seasonal allergic rhinitis ICD-10-CM: J30.2  ICD-9-CM: 477.9  12/6/2012    Overview Signed 12/6/2012  2:27 PM by Sara Neumann MD     Spring and Fall worst             Pneumonia ICD-10-CM: J18.9  ICD-9-CM: 151  12/6/2012    Overview Signed 12/6/2012  2:28 PM by Sara Neumann MD     Spring 2012, treated outpt, [facility]             Chronic depression ICD-10-CM: F32. A  ICD-9-CM: 673  12/6/2012    Overview Signed 11/22/2022 10:10 AM by Zulma Maguire MD     Treated sporadically over the years w/ Effexor or Celexa (better) then Wellbutrin             H/O Recurrent Asthmatic Bronchitis ICD-10-CM: J45.909  ICD-9-CM: 493.90  12/6/2012    Overview Signed 12/6/2012  2:38 PM by Zulma Maguire MD     Since ~ 2006             OA (osteoarthritis) of left knee ICD-10-CM: M17.9  ICD-9-CM: 715.36  12/6/2012    Overview Addendum 3/25/2015 12:00 PM by Zulma Maguire MD     Xrays 2011; Ortho Dr Keo Dsouza, 12/2013, xrayed again, advanced DJD/OA ; CSI 12/2013, 3/2014, 6/2014, 12/2014                    PAST SURGICAL HISTORY     Past Surgical History:   Procedure Laterality Date    HX BREAST BIOPSY  12/17/2012    right breast, benign, calcifications; Marty Casillas; Fibrosis    HX COLONOSCOPY  04/14/2015    Normal, follow up 10 yrs, Dr Boo Hannon Left 08/2016    Dr Chriss Winter SKIN BIOPSY  2014    [facility] removed from right upper back    HX TOTAL LAPAROSCOPIC HYSTERECTOMY W/ BS&O  09/24/2015    Total Lap Hysterectomy and BSO; Endometrial cancer; grade 1 endometrioid adenocarcinoma.; Dr. Camelia Browning, 25 yo         MEDICATIONS     Current Outpatient Medications   Medication Sig    meloxicam (MOBIC) 7.5 mg tablet Take 7.5 mg by mouth daily. atorvastatin (LIPITOR) 10 mg tablet TAKE 1 TABLET BY MOUTH EVERY DAY    cholecalciferol, vitamin D3, (VITAMIN D3 PO) Take 1,000 Units by mouth daily. montelukast (SINGULAIR) 10 mg tablet TAKE 1 TABLET BY MOUTH EVERY DAY    buPROPion XL (WELLBUTRIN XL) 300 mg XL tablet Take 1 Tablet by mouth daily. albuterol sulfate (ProAir RespiClick) 90 mcg/actuation aepb Take 2 Puffs by inhalation every six (6) hours as needed for Wheezing or Cough.    multivitamin (ONE A DAY) tablet Take 1 Tab by mouth daily.  Includes Vitamin D 1,000 units cetirizine (ZYRTEC) 10 mg tablet Take 10 mg by mouth daily. Indications: non-seasonal allergic stuffy and runny nose     No current facility-administered medications for this visit.           ALLERGIES     Allergies   Allergen Reactions    Sulfa (Sulfonamide Antibiotics) Other (comments)     Causes migraines    Tramadol Itching          SOCIAL HISTORY     Social History     Tobacco Use    Smoking status: Former     Types: Cigarettes     Quit date: 1985     Years since quittin.9    Smokeless tobacco: Never    Tobacco comments:     used to be a light smoker x 3 yrs   Substance Use Topics    Alcohol use: Yes     Comment: very seldom, maybe 1-2 glasses of wine or beer a month at most     Social History     Social History Narrative    , 3 children (2 daughters, 1 son) (youngest daughter in [place], oldest daughter in [facility], son local)    4 grand daughters, 1 grand daughter on the way and 1 grand son on the way    Former  at [employer]    Retired summer 2022    Enjoys knitting and sewing daily        Diet: nothing special, admits her eating habits are not good    Caffeine: 1 cup of coffee qam, no tea or sodas    Exercise: nothing since the summer, was walking 2-3 x a week x 30 minutes; stays active and tries to get in at least 8-10 k steps per day; plans to join [employer] so she can start swimming again    Weight: 220's-230's over the years         Social History     Substance and Sexual Activity   Sexual Activity Yes    Partners: Male    Comment: Postmenopause       IMMUNIZATIONS     Immunization History   Administered Date(s) Administered    COVID-19, PFIZER PURPLE top, DILUTE for use, (age 15 y+), IM, 30mcg/0.3mL 2021, 2021, 10/10/2021, 10/19/2022    Influenza Vaccine 2012, 2013, 10/19/2022    Influenza, FLUARIX, FLULAVAL, FLUZONE (age 10 mo+) AND AFLURIA, (age 1 y+), PF, 0.5mL 2017, 10/08/2020, 2022    TDAP Vaccine 2006    Td 2016    Tdap 2020         FAMILY HISTORY     Family History   Problem Relation Age of Onset    Kidney Disease Mother         kidney transplant age 77yo ;  renal failure age 80    Diabetes Mother 36    High Cholesterol Mother     OSTEOARTHRITIS Mother         B knee replacements in her 63's    Heart Disease Father     Diabetes Father     Dementia Father         Alzheimer's;  after fall and hip fx    Heart Surgery Father         CABG in his 63's    Arthritis Father         hand arthritis and Dupytrens Contractures    No Known Problems Son     Allergic Rhinitis Daughter     No Known Problems Daughter     Hypertension Brother     Hypertension Brother     Diabetes Brother         mild, controlling w/ diet    Diabetes Maternal Grandfather     Heart Attack Maternal Grandfather 62         MI          VITALS   Visit Vitals  /82 (BP 1 Location: Left upper arm, BP Patient Position: Sitting, BP Cuff Size: Adult long)   Pulse 87   Temp 98.2 °F (36.8 °C) (Oral)   Resp 16   Ht 5' 9\" (1.753 m)   Wt 233 lb 6.4 oz (105.9 kg)   LMP 2011 (Within Years)   SpO2 97%   BMI 34.47 kg/m²          PHYSICAL EXAMINATION   Physical Exam  Vitals reviewed. Constitutional:       General: She is not in acute distress. HENT:      Right Ear: Tympanic membrane normal.      Left Ear: Tympanic membrane normal.   Eyes:      General: No scleral icterus. Neck:      Thyroid: No thyroid mass, thyromegaly or thyroid tenderness. Vascular: No carotid bruit. Cardiovascular:      Rate and Rhythm: Normal rate and regular rhythm. Heart sounds: Normal heart sounds. Pulmonary:      Effort: Pulmonary effort is normal.      Breath sounds: Normal breath sounds. Abdominal:      Palpations: Abdomen is soft. There is no mass. Tenderness: There is no abdominal tenderness. Musculoskeletal:         General: No swelling or tenderness. Cervical back: Neck supple. Right lower leg: No edema.       Left lower leg: No edema. Lymphadenopathy:      Cervical: No cervical adenopathy. Skin:     General: Skin is warm and dry. Neurological:      General: No focal deficit present. Mental Status: She is alert and oriented to person, place, and time. Mental status is at baseline. Cranial Nerves: No cranial nerve deficit. Motor: No weakness. Gait: Gait normal.   Psychiatric:         Mood and Affect: Mood normal.             ASSESSMENT & PLAN   Diagnoses and all orders for this visit:    1. Routine general medical examination at a health care facility  -     CBC W/O DIFF; Future  -     METABOLIC PANEL, COMPREHENSIVE; Future  -     LIPID PANEL; Future  -     TSH 3RD GENERATION; Future  -     HEMOGLOBIN A1C WITH EAG; Future    2. Mixed hyperlipidemia maintained on Lipitor daily  -     LIPID PANEL; Future    3. Prediabetes  -     HEMOGLOBIN A1C WITH EAG; Future    Fasting labs checked today  Cardiovascular risk and specific lipid/LDL/HgbA1c goals assessed  Reviewed diet, nutrition, exercise, weight management, BMI/goals. Age/risk based screening recommendations, health maintenance & prevention counseling. Cancer screening USPTFS guidelines reviewed w/ pt today. Discussed benefits/positive/negative outcomes of screening based on age/risk stratification. Informed consent for/against screening based on pt's personal hx/risk factors. Updated in history above and health maintenance. Reviewed medications and side effects   Doing well on current regimen. No changes at this time. Further follow up & other recommendations pending review of labs.  If all remains good and stable, follow up in 1 yr or sooner prn

## 2022-11-22 NOTE — PROGRESS NOTES
Chief Complaint   Patient presents with    Complete Physical     Fasting    Pre-diabetes    Cholesterol Problem     1. \"Have you been to the ER, urgent care clinic since your last visit? Hospitalized since your last visit? \" No    2. \"Have you seen or consulted any other health care providers outside of the [address] [place] [address] since your last visit? \" No     3. For patients aged 39-70: Has the patient had a colonoscopy / FIT/ Cologuard? Yes - no Care Gap present 4/2015 Normal, follow up 10 yrs, Dr Yee Hambleton      If the patient is female:    4. For patients aged 41-77: Has the patient had a mammogram within the past 2 years? Yes - no Care Gap present  7/2022 Brandy      5. For patients aged 21-65: Has the patient had a pap smear?  Yes - no Care Gap present Gyn Dr Kenroy Parra

## 2022-11-23 LAB
ALBUMIN SERPL-MCNC: 4.5 G/DL (ref 3.5–5)
ALBUMIN/GLOB SERPL: 1.5 {RATIO} (ref 1.1–2.2)
ALP SERPL-CCNC: 93 U/L (ref 45–117)
ALT SERPL-CCNC: 38 U/L (ref 12–78)
ANION GAP SERPL CALC-SCNC: 3 MMOL/L (ref 5–15)
AST SERPL-CCNC: 28 U/L (ref 15–37)
BILIRUB SERPL-MCNC: 0.5 MG/DL (ref 0.2–1)
BUN SERPL-MCNC: 17 MG/DL (ref 6–20)
BUN/CREAT SERPL: 20 (ref 12–20)
CALCIUM SERPL-MCNC: 9.4 MG/DL (ref 8.5–10.1)
CHLORIDE SERPL-SCNC: 105 MMOL/L (ref 97–108)
CHOLEST SERPL-MCNC: 183 MG/DL
CO2 SERPL-SCNC: 32 MMOL/L (ref 21–32)
CREAT SERPL-MCNC: 0.85 MG/DL (ref 0.55–1.02)
ERYTHROCYTE [DISTWIDTH] IN BLOOD BY AUTOMATED COUNT: 12.9 % (ref 11.5–14.5)
EST. AVERAGE GLUCOSE BLD GHB EST-MCNC: 128 MG/DL
GLOBULIN SER CALC-MCNC: 3.1 G/DL (ref 2–4)
GLUCOSE SERPL-MCNC: 116 MG/DL (ref 65–100)
HBA1C MFR BLD: 6.1 % (ref 4–5.6)
HCT VFR BLD AUTO: 43.8 % (ref 35–47)
HDLC SERPL-MCNC: 54 MG/DL
HDLC SERPL: 3.4 {RATIO} (ref 0–5)
HGB BLD-MCNC: 13.7 G/DL (ref 11.5–16)
LDLC SERPL CALC-MCNC: 104.6 MG/DL (ref 0–100)
MCH RBC QN AUTO: 29.3 PG (ref 26–34)
MCHC RBC AUTO-ENTMCNC: 31.3 G/DL (ref 30–36.5)
MCV RBC AUTO: 93.8 FL (ref 80–99)
NRBC # BLD: 0 K/UL (ref 0–0.01)
NRBC BLD-RTO: 0 PER 100 WBC
PLATELET # BLD AUTO: 341 K/UL (ref 150–400)
PMV BLD AUTO: 10.3 FL (ref 8.9–12.9)
POTASSIUM SERPL-SCNC: 4.9 MMOL/L (ref 3.5–5.1)
PROT SERPL-MCNC: 7.6 G/DL (ref 6.4–8.2)
RBC # BLD AUTO: 4.67 M/UL (ref 3.8–5.2)
SODIUM SERPL-SCNC: 140 MMOL/L (ref 136–145)
TRIGL SERPL-MCNC: 122 MG/DL (ref ?–150)
TSH SERPL DL<=0.05 MIU/L-ACNC: 2.81 UIU/ML (ref 0.36–3.74)
VLDLC SERPL CALC-MCNC: 24.4 MG/DL
WBC # BLD AUTO: 7.6 K/UL (ref 3.6–11)

## 2023-01-28 DIAGNOSIS — E78.2 MIXED HYPERLIPIDEMIA: ICD-10-CM

## 2023-01-28 DIAGNOSIS — F32.A CHRONIC DEPRESSION: ICD-10-CM

## 2023-01-28 RX ORDER — BUPROPION HYDROCHLORIDE 300 MG/1
TABLET ORAL
Qty: 90 TABLET | Refills: 2 | Status: SHIPPED | OUTPATIENT
Start: 2023-01-28

## 2023-01-28 RX ORDER — ATORVASTATIN CALCIUM 10 MG/1
TABLET, FILM COATED ORAL
Qty: 90 TABLET | Refills: 2 | Status: SHIPPED | OUTPATIENT
Start: 2023-01-28

## 2023-02-01 DIAGNOSIS — J30.89 PERENNIAL ALLERGIC RHINITIS WITH SEASONAL VARIATION: ICD-10-CM

## 2023-02-01 DIAGNOSIS — J30.2 PERENNIAL ALLERGIC RHINITIS WITH SEASONAL VARIATION: ICD-10-CM

## 2023-02-01 DIAGNOSIS — J45.40 MODERATE PERSISTENT ASTHMA WITHOUT COMPLICATION: ICD-10-CM

## 2023-02-03 RX ORDER — MONTELUKAST SODIUM 10 MG/1
TABLET ORAL
Qty: 90 TABLET | Refills: 3 | Status: SHIPPED | OUTPATIENT
Start: 2023-02-03

## 2023-02-17 ENCOUNTER — OFFICE VISIT (OUTPATIENT)
Dept: FAMILY MEDICINE CLINIC | Age: 63
End: 2023-02-17

## 2023-02-17 ENCOUNTER — NURSE TRIAGE (OUTPATIENT)
Dept: OTHER | Facility: CLINIC | Age: 63
End: 2023-02-17

## 2023-02-17 VITALS
SYSTOLIC BLOOD PRESSURE: 131 MMHG | WEIGHT: 230 LBS | TEMPERATURE: 98.1 F | HEART RATE: 78 BPM | RESPIRATION RATE: 16 BRPM | DIASTOLIC BLOOD PRESSURE: 85 MMHG | OXYGEN SATURATION: 97 % | HEIGHT: 69 IN | BODY MASS INDEX: 34.07 KG/M2

## 2023-02-17 DIAGNOSIS — M25.512 CHRONIC LEFT SHOULDER PAIN: Primary | ICD-10-CM

## 2023-02-17 DIAGNOSIS — M25.50 GENERALIZED JOINT PAIN: ICD-10-CM

## 2023-02-17 DIAGNOSIS — G89.29 CHRONIC LEFT SHOULDER PAIN: Primary | ICD-10-CM

## 2023-02-17 PROCEDURE — 99214 OFFICE O/P EST MOD 30 MIN: CPT | Performed by: NURSE PRACTITIONER

## 2023-02-17 RX ORDER — METHYLPREDNISOLONE 4 MG/1
TABLET ORAL
Qty: 1 DOSE PACK | Refills: 0 | Status: SHIPPED | OUTPATIENT
Start: 2023-02-17

## 2023-02-17 NOTE — PROGRESS NOTES
Chief Complaint   Patient presents with    Shoulder Pain     Left    Neck Pain         1. \"Have you been to the ER, urgent care clinic since your last visit? Hospitalized since your last visit? \" No    2. \"Have you seen or consulted any other health care providers outside of the [address] [place] [address] since your last visit? \" No     3. For patients over 45: Has the patient had a colonoscopy? Yes - no Care Gap present     If the patient is female:    4. For patients over 40: Has the patient had a mammogram? Yes - no Care Gap present    5. For patients over 21: Has the patient had a pap smear?  Yes - no Care Gap present     3 most recent PHQ Screens 11/22/2022   Little interest or pleasure in doing things Not at all   Feeling down, depressed, irritable, or hopeless Not at all   Total Score PHQ 2 0       Health Maintenance Due   Topic Date Due    COVID-19 Vaccine (5 - Booster for Pfizer series) 12/14/2022

## 2023-02-17 NOTE — PROGRESS NOTES
[facility] Note     Gita Welsh (: 1960) is a 60 y.o. female, established patient, here for evaluation of the following chief complaint(s):  Shoulder Pain (Left) and Neck Pain       ASSESSMENT/PLAN:  1. Chronic left shoulder pain  -     XR SHOULDER LT AP/LAT MIN 2 V; Future  -     methylPREDNISolone (MEDROL DOSEPACK) 4 mg tablet; Per pack instructions, Normal, Disp-1 Dose Pack, R-0  -     REFERRAL TO ORTHOPEDICS      Return if symptoms worsen or fail to improve. {Time Documentation Optional:29497}    SUBJECTIVE/OBJECTIVE:    Gita Welsh is a 60 y.o. female seen today for ***    Left shoulder pain x 1 month ago. Had something similar about a year ago on the opposite side. Attributed to neck    Feels that it is getting worse. Primarily at night and radiates down the left arm and across the pectoral area. Generalized aches and increased size of finger joints, hands ache and feet ache on both sides of the body. Worse in the morning    Followed by ortho [facility] for knee    REVIEW OF SYSTEMS:    Review of Systems      VITAL SIGNS:    Wt Readings from Last 3 Encounters:   23 230 lb (104.3 kg)   22 233 lb 6.4 oz (105.9 kg)   22 224 lb 6.4 oz (101.8 kg)     Temp Readings from Last 3 Encounters:   23 98.1 °F (36.7 °C) (Temporal)   22 98.2 °F (36.8 °C) (Oral)   22 98.1 °F (36.7 °C) (Oral)     BP Readings from Last 3 Encounters:   23 131/85   22 128/82   22 132/84     Pulse Readings from Last 3 Encounters:   23 78   22 87   22 80           PHYSICAL EXAMINATION:       General: Alert, cooperative, no distress  Respiratory: Breathing comfortably, in no acute respiratory distress. Clear to auscultation bilaterally. Cardiovascular: Regular rate and rhythm, S1, S2 normal, no murmur, click, rub or gallop. Extremities: no edema. Abdomen: Soft, non-tender, not distended.  Bowel sounds normal. No masses or organomegaly. MSK: Extremities normal appearing, atraumatic, no effusion. Gait steady and unassisted. Skin: Skin color, texture, turgor normal. No rashes or lesions on exposed skin. Neurologic: A/Ox3  Psychiatric: Normal affect. Mood euthymic. Thoughts logical. Speech volume and speed normal            Treatment risks/benefits/costs/interactions/alternatives discussed with patient. Advised patient to call back or return to office if symptoms worsen/change/persist. If patient cannot reach us or should anything more severe/urgent arise he/she should proceed directly to the nearest emergency department. Discussed expected course/resolution/complications of diagnosis in detail with patient. Patient expressed understanding with the diagnosis and plan. An electronic signature was used to authenticate this note.   -- Eva Maza NP should proceed directly to the nearest emergency department. Discussed expected course/resolution/complications of diagnosis in detail with patient. Patient expressed understanding with the diagnosis and plan. An electronic signature was used to authenticate this note.   -- Arelis Madden NP

## 2023-02-17 NOTE — TELEPHONE ENCOUNTER
Location of patient: [address] [facility] [address] garcia from [facility] at [facility] with [email or web address]. Subjective: Caller states \"left arm shoulder and neck pain\"     Current Symptoms: pain started about a month ago. Doesn't recall doing anything to irritate shoulder and neck except picking up her grandchildren. Pain radiating to collar bone and down the arm. Had similar shoulder pain on right side and was told that she had compression of neck vertebrae. Denies shortness of breath. When lifting arm a certain way pain is worse and at night. Denies swelling. At night said it feels like pain goes down center of arm. Denies numbness or tingling. Pain wakes her out of sleep and keeps her from sleeping. Denies diaphoresis. Denies hx high blood pressure, diabetes. Onset: 1 month ago; unchanged    Associated Symptoms: NA    Pain Severity: 5/10 shoulder ; aching; constant    Temperature: n/a     What has been tried: heat and ice packs    LMP: NA Pregnant: NA    Recommended disposition: Go to Office Now    Care advice provided, patient verbalizes understanding; denies any other questions or concerns; instructed to call back for any new or worsening symptoms. Patient/Caller agrees with recommended disposition; writer provided warm transfer to [place] for appointment scheduling    Attention Provider: Thank you for allowing me to participate in the care of your patient. The patient was connected to triage in response to information provided to the [facility]. Please do not respond through this encounter as the response is not directed to a shared pool.   Reason for Disposition   SEVERE pain (e.g., excruciating, unable to do any normal activities)    Protocols used: Shoulder Pain-ADULT-[place]

## 2023-02-20 DIAGNOSIS — G89.29 CHRONIC LEFT SHOULDER PAIN: Primary | ICD-10-CM

## 2023-02-20 DIAGNOSIS — M25.512 CHRONIC LEFT SHOULDER PAIN: Primary | ICD-10-CM

## 2023-03-10 ENCOUNTER — VIRTUAL VISIT (OUTPATIENT)
Dept: FAMILY MEDICINE CLINIC | Age: 63
End: 2023-03-10

## 2023-03-10 DIAGNOSIS — J40 BRONCHITIS: Primary | ICD-10-CM

## 2023-03-10 RX ORDER — DOXYCYCLINE 100 MG/1
100 TABLET ORAL 2 TIMES DAILY
Qty: 10 TABLET | Refills: 0 | Status: SHIPPED | OUTPATIENT
Start: 2023-03-10 | End: 2023-03-15

## 2023-03-10 RX ORDER — BENZONATATE 100 MG/1
100 CAPSULE ORAL
Qty: 30 CAPSULE | Refills: 0 | Status: SHIPPED | OUTPATIENT
Start: 2023-03-10 | End: 2023-03-20

## 2023-03-10 NOTE — PROGRESS NOTES
Bonita Poon  60 y.o. female  1960  [facility]  [facility] [phone]  33 [facility]  Telemedicine Progress Note  Pritesh Easton MD       Encounter Date and Time: March 10, 2023 at 10:23 AM    Consent:  She and/or the health care decision maker is aware that that she may receive a bill for this telephone service, depending on her insurance coverage, and has provided verbal consent to proceed: Yes    Chief Complaint   Patient presents with    Cold Symptoms     History of Present Illness   Geovanny Chin is a 60 y.o. female was evaluated by synchronous (real-time) audio-video technology from home, through the [facility] Patient Portal.     Cold Symptoms   A week ago started with has scratchy throat and then had no voice the following day. [facility]. Was babysitting grandchildren at the time and strep was negative. Was told to take mucinex, flonase, albuterol and tylenol for body aches which is what  she has been doing. She has had nonstop coughing with congestion and her chest hurts. She just had COVID end of January. She does not have much nasal drainage but is coughing up mucus which has some color to it. No fever but suspects low grade fever and has had some body aches in the last few days. Review of Systems   Review of Systems   Constitutional:  Positive for chills. Negative for fever. HENT:  Positive for sore throat. Negative for congestion. Respiratory:  Positive for cough. Cardiovascular:  Negative for chest pain and palpitations. Musculoskeletal:  Positive for myalgias. Neurological:  Negative for dizziness and headaches.      Vitals/Objective:     General: alert, fatigued, cooperative, no distress, hoarse voice   Mental  status: mental status: alert, oriented to person, place, and time, normal mood, behavior, speech, dress, motor activity, and thought processes   Resp: resp: normal effort, no respiratory distress, no accessory muscle use, and coughing - dry   Neuro: neuro: no gross deficits   Skin: skin: no discoloration or lesions of concern on visible areas   Due to this being a TeleHealth evaluation, many elements of the physical examination are unable to be assessed. Assessment and Plan:     59 yo F with recurrent asthmatic bronchitis being seen for one week of cough productive of mucus with sore throat, chills and myalgias. Recent COVID so unlikely covid and strep negative at urgent care 1 week ago. Failed OTC remedies and with fever and cough coverage for CAP. 1. Bronchitis  - doxycycline (ADOXA) 100 mg tablet; Take 1 Tablet by mouth two (2) times a day for 5 days. Dispense: 10 Tablet; Refill: 0  - benzonatate (TESSALON) 100 mg capsule; Take 1 Capsule by mouth three (3) times daily as needed for Cough for up to 10 days. Dispense: 30 Capsule; Refill: 0  - if no imporvement on above regimen, RTC for in office visit for re-evaluation       We discussed the expected course, resolution and complications of the diagnosis(es) in detail. Medication risks, benefits, costs, interactions, and alternatives were discussed as indicated. I advised her to contact the office if her condition worsens, changes or fails to improve as anticipated. She expressed understanding with the diagnosis(es) and plan. Patient understands that this encounter was a temporary measure, and the importance of further follow up and examination was emphasized. Patient verbalized understanding. Patient informed to follow up: Follow-up and Dispositions    Return if symptoms worsen or fail to improve, for routine care with Dr Yanci Orellana.          Electronically Signed: Jhon Estrella MD    Providers location when delivering service: clinic     Pursuant to the emergency declaration under the [facility], [address] [place] [address] and the [employer] Act, this Virtual  Visit was conducted, with patient's consent, to reduce the patient's risk of exposure to COVID-19 and provide continuity of care for an established patient. Services were provided through a video synchronous discussion virtually to substitute for in-person clinic visit. History   Patients past medical, surgical and family histories were reviewed and updated.       Past Medical History:   Diagnosis Date    Avulsion fracture of right ankle 07/10/2015    [facility], boot    Nguyen's cyst 12/09/2013    Left knee; 12/6/13, Dr Donovan Cross    Cervical spinal stenosis w/ right radiculopathy 07/14/2016 2016 Dr Christine Lynch, PT    Chronic depression 12/06/2012    Closed fracture of left ankle 08/26/2019 6-1-19, boot, no surgery    DDD (degenerative disc disease), cervical 07/14/2016    H/O Recurrent Asthmatic Bronchitis 12/06/2012    History of endometrial cancer 04/01/2020    Grade 1 Endometrioid Carcinoma, S/P Total Lap Hysterectomy-BSO 9-2015; Gyn Dr Yasmin German, Dr Morris Esparza      Mild intermittent asthma without complication 61/01/9200    2-3 x a year triggered by peak allergy time or URI's    Mixed hyperlipidemia 8/26/2019    Moderate persistent asthma without complication 93/64/0818    3-2020    OA (osteoarthritis) of left knee 12/06/2012    Perennial allergic rhinitis with seasonal variation 05/09/2019    Peaks spring and fall    Pneumonia 12/06/2012    Postmenopause, LMP 46 yo, No HRT 12/06/2012    Prediabetes 09/12/2020    Screening exams for skin cancer 03/30/2016    Dermatologist routine checkes    Vitamin D deficiency 12/23/2012     Past Surgical History:   Procedure Laterality Date    HX BREAST BIOPSY  12/17/2012    right breast, benign, calcifications; Lai Pucker; Fibrosis    HX COLONOSCOPY  04/14/2015    Normal, follow up 10 yrs, Dr Oseas Yao Left 08/2016    Dr Elmer Crocker  2014    [address] removed from right upper back    HX TOTAL LAPAROSCOPIC HYSTERECTOMY W/ BS&O  2015    Total Lap Hysterectomy and BSO; Endometrial cancer; grade 1 endometrioid adenocarcinoma.; Dr. Marlene Ling, 23 yo     Family History   Problem Relation Age of Onset    Kidney Disease Mother         kidney transplant age 77yo ;  renal failure age 80    Diabetes Mother 36    High Cholesterol Mother     OSTEOARTHRITIS Mother         B knee replacements in her 63's    Heart Disease Father     Diabetes Father     Dementia Father         Alzheimer's;  after fall and hip fx    Heart Surgery Father         CABG in his 63's    Arthritis Father         hand arthritis and Dupytrens Contractures    No Known Problems Son     Allergic Rhinitis Daughter     No Known Problems Daughter     Hypertension Brother     Hypertension Brother     Diabetes Brother         mild, controlling w/ diet    Diabetes Maternal Grandfather     Heart Attack Maternal Grandfather 62         MI      Social History     Socioeconomic History    Marital status:      Spouse name: Not on file    Number of children: 3    Years of education: Not on file    Highest education level: Not on file   Occupational History    Occupation:  at [employer], [address] [employer], Retired Summer 2022   Tobacco Use    Smoking status: Former     Types: Cigarettes     Quit date: 1985     Years since quittin.2    Smokeless tobacco: Never    Tobacco comments:     used to be a light smoker x 3 yrs   Substance and Sexual Activity    Alcohol use: Yes     Comment: very seldom, maybe 1-2 glasses of wine or beer a month at most    Drug use: No    Sexual activity: Yes     Partners: Male     Comment: Postmenopause   Other Topics Concern     Service Not Asked    Blood Transfusions Not Asked    Caffeine Concern No     Comment: 1 cup of coffee  qd    Occupational Exposure Not Asked    Hobby Hazards Not Asked    Sleep Concern Not Asked    Stress Concern Not Asked    Weight Concern Yes     Comment: wants to lose wt    Special Diet Yes     Comment: since 8-2020 cut out processed foods, cut back on junk food, cut out sodas, not addiding salt    Back Care Not Asked    Exercise No     Comment: was swimming some over the summer but not lately    Bike Helmet Not Asked    Seat Belt Not Asked    Self-Exams Not Asked   Social History Narrative    , 3 children (2 daughters, 1 son) (youngest daughter in [place], oldest daughter in [facility], son local)    4 grand daughters, 1 grand daughter on the way and 1 grand son on the way    Former  at [employer]    Retired summer 2022    Enjoys knitting and sewing daily        Diet: nothing special, admits her eating habits are not good    Caffeine: 1 cup of coffee qam, no tea or sodas    Exercise: nothing since the summer, was walking 2-3 x a week x 30 minutes; stays active and tries to get in at least 8-10 k steps per day; plans to join [employer] so she can start swimming again    Weight: 220's-230's over the years         Social Determinants of Health     Financial Resource Strain: Not on file   Food Insecurity: Not on file   Transportation Needs: Not on file   Physical Activity: Not on file   Stress: Not on file   Social Connections: Not on file   Intimate Partner Violence: Not on file   Housing Stability: Not on file     Patient Active Problem List   Diagnosis Code    Postmenopause, LMP 44 yo, No HRT Z78.0    Seasonal allergic rhinitis J30.2    Pneumonia J18.9    Chronic depression F32. A    H/O Recurrent Asthmatic Bronchitis J45.909    OA (osteoarthritis) of left knee M17.9    Vitamin D deficiency E55.9    Baker's cyst M71.20    Avulsion fracture of right ankle S82.899A    Screening exams for skin cancer Z12.83    DDD (degenerative disc disease), cervical M50.30    Cervical spinal stenosis w/ right radiculopathy M48.02    Gross hematuria R31.0    Mild intermittent asthma without complication P04.96    Perennial allergic rhinitis with seasonal variation J30.89, J30.2    Closed fracture of left ankle S82.892A    Mixed hyperlipidemia E78.2    Moderate persistent asthma without complication O64.82    History of endometrial cancer Z85.42    Prediabetes R73.03          Current Medications/Allergies   Medications and Allergies reviewed:    Current Outpatient Medications   Medication Sig Dispense Refill    doxycycline (ADOXA) 100 mg tablet Take 1 Tablet by mouth two (2) times a day for 5 days. 10 Tablet 0    benzonatate (TESSALON) 100 mg capsule Take 1 Capsule by mouth three (3) times daily as needed for Cough for up to 10 days. 30 Capsule 0    methylPREDNISolone (MEDROL DOSEPACK) 4 mg tablet Per pack instructions 1 Dose Pack 0    montelukast (SINGULAIR) 10 mg tablet TAKE 1 TABLET BY MOUTH EVERY DAY 90 Tablet 3    atorvastatin (LIPITOR) 10 mg tablet TAKE 1 TABLET BY MOUTH EVERY DAY 90 Tablet 2    buPROPion XL (WELLBUTRIN XL) 300 mg XL tablet TAKE 1 TABLET BY MOUTH EVERY DAY 90 Tablet 2    meloxicam (MOBIC) 7.5 mg tablet Take 7.5 mg by mouth daily. cholecalciferol, vitamin D3, (VITAMIN D3 PO) Take 1,000 Units by mouth daily. albuterol sulfate (ProAir RespiClick) 90 mcg/actuation aepb Take 2 Puffs by inhalation every six (6) hours as needed for Wheezing or Cough. 1 Inhaler 0    multivitamin (ONE A DAY) tablet Take 1 Tab by mouth daily. Includes Vitamin D 1,000 units      cetirizine (ZYRTEC) 10 mg tablet Take 10 mg by mouth daily.  Indications: non-seasonal allergic stuffy and runny nose       Allergies   Allergen Reactions    Sulfa (Sulfonamide Antibiotics) Other (comments)     Causes migraines    Tramadol Itching

## 2023-03-27 ENCOUNTER — NURSE TRIAGE (OUTPATIENT)
Dept: OTHER | Facility: CLINIC | Age: 63
End: 2023-03-27

## 2023-03-27 ENCOUNTER — VIRTUAL VISIT (OUTPATIENT)
Dept: FAMILY MEDICINE CLINIC | Age: 63
End: 2023-03-27
Payer: COMMERCIAL

## 2023-03-27 DIAGNOSIS — M54.42 ACUTE LEFT-SIDED LOW BACK PAIN WITH LEFT-SIDED SCIATICA: Primary | ICD-10-CM

## 2023-03-27 PROCEDURE — 99214 OFFICE O/P EST MOD 30 MIN: CPT | Performed by: STUDENT IN AN ORGANIZED HEALTH CARE EDUCATION/TRAINING PROGRAM

## 2023-03-27 RX ORDER — METHYLPREDNISOLONE 4 MG/1
TABLET ORAL
Qty: 1 DOSE PACK | Refills: 0 | Status: SHIPPED | OUTPATIENT
Start: 2023-03-27

## 2023-03-27 RX ORDER — CYCLOBENZAPRINE HCL 5 MG
5 TABLET ORAL
Qty: 21 TABLET | Refills: 0 | Status: SHIPPED | OUTPATIENT
Start: 2023-03-27

## 2023-03-27 NOTE — TELEPHONE ENCOUNTER
Location of patient: [address] [facility] [address] garcia from [facility] at [facility] with [employer]. Subjective: Caller states \"lower left back pain- have a weak spot from injury years ago. Twisted a little while bending and felt a twinge. \"     Current Symptoms: lower left back pain    Denies swelling, numbness, tingling. Loss of bowel or bladder control    Onset: 2 days ago; unchanged    Associated Symptoms: reduced activity, increased wakefulness    Pain Severity: 4-10/10; sharp, dull ache; constant    Temperature: n/a     What has been tried: ice,, heat,  using walker, meloxicam daily AM, tylenol(Not significantly helpful)    LMP: NA Pregnant: NA    Recommended disposition: See in Office Today    Care advice provided, patient verbalizes understanding; denies any other questions or concerns; instructed to call back for any new or worsening symptoms. Patient/Caller agrees with recommended disposition; writer provided warm transfer to [place] at [facility] for appointment scheduling    Attention Provider: Thank you for allowing me to participate in the care of your patient. The patient was connected to triage in response to information provided to the [facility]. Please do not respond through this encounter as the response is not directed to a shared pool.         Reason for Disposition   SEVERE back pain (e.g., excruciating, unable to do any normal activities) and not improved after pain medicine and CARE ADVICE    Protocols used: Back Pain-ADULT-[place]

## 2023-03-27 NOTE — PROGRESS NOTES
Aline Poon  60 y.o. female  1960  [facility]  [place] [address] [phone]  [address] [facility]  Telemedicine Progress Note  Aaron Mensah MD       Encounter Date and Time: March 27, 2023 at 8:41 AM    Consent:  She and/or the health care decision maker is aware that that she may receive a bill for this telephone service, depending on her insurance coverage, and has provided verbal consent to proceed: Yes    Chief Complaint   Patient presents with    Back Pain     History of Present Illness   Candace Thomas is a 60 y.o. female was evaluated by synchronous (real-time) audio-video technology from home, through the [facility] Patient Portal.    Back Pain  Bent and turned wrong two days ago. Pain came on suddenly in the left low back - it is dull with sitting but sharp, shooting pain with ambulating and moving. She is using a walker to ambulate - leaning forward helps with the pain. takes mobic daily and has taken tylenol 6/daily in addition to the mobic. No changes in urination nor bowel movements. Recently completed steroid pack for shoulder and planned to start PT in two days. Review of Systems   Review of Systems   Constitutional:  Negative for chills and fever. Genitourinary:  Negative for dysuria and urgency. Musculoskeletal:  Positive for back pain. Negative for falls, joint pain, myalgias and neck pain. Neurological:  Negative for dizziness and headaches. Vitals/Objective:     General: alert, cooperative, no distress   Mental  status: mental status: alert, oriented to person, place, and time, normal mood, behavior, speech, dress, motor activity, and thought processes   Resp: resp: normal effort and no respiratory distress   Neuro: neuro: no gross deficits   Skin: skin: no discoloration or lesions of concern on visible areas   Due to this being a TeleHealth evaluation, many elements of the physical examination are unable to be assessed.       Assessment and Plan:     58yo F with chronic left shoulder pain, known meniscus injury in left knee being seen for 2 days of acute pain in left low back with description of pain c/w sciatica. Very limited as evaluated over VV. Trial steroid as refractory to daily NSAID + tylenol and add PT to eval and treat for low back pain. Suspect deficit in knee is certainly contributing to left low back pain - would benefit from thorough PT eval and possibly followup with ortho re option to treat knee especially if recurrent left sided issues. 1. Acute left-sided low back pain with left-sided sciatica  - methylPREDNISolone (MEDROL DOSEPACK) 4 mg tablet; Take as instructed on dose pack  Dispense: 1 Dose Pack; Refill: 0  - cyclobenzaprine (FLEXERIL) 5 mg tablet; Take 1 Tablet by mouth three (3) times daily as needed for Muscle Spasm(s). Dispense: 21 Tablet; Refill: 0  - followup with PT to eval and treat left low back pain         We discussed the expected course, resolution and complications of the diagnosis(es) in detail. Medication risks, benefits, costs, interactions, and alternatives were discussed as indicated. I advised her to contact the office if her condition worsens, changes or fails to improve as anticipated. She expressed understanding with the diagnosis(es) and plan. Patient understands that this encounter was a temporary measure, and the importance of further follow up and examination was emphasized. Patient verbalized understanding. Patient informed to follow up: Follow-up and Dispositions    Return for routine care with Dr Jeannie Hopkins.          Electronically Signed: Estela Gordon MD    Providers location when delivering service: home       Pursuant to the emergency declaration under the [facility] waiver authority and the [employer] Act, this Virtual  Visit was conducted, with patient's consent, to reduce the patient's risk of exposure to COVID-19 and provide continuity of care for an established patient. Services were provided through a video synchronous discussion virtually to substitute for in-person clinic visit. History   Patients past medical, surgical and family histories were reviewed and updated.       Past Medical History:   Diagnosis Date    Avulsion fracture of right ankle 07/10/2015    [facility] boot    Nguyen's cyst 12/09/2013    Left knee; 12/6/13, Dr Breann Rucker    Cervical spinal stenosis w/ right radiculopathy 07/14/2016 2016 Dr Annalisa Goff, PT    Chronic depression 12/06/2012    Closed fracture of left ankle 08/26/2019    6-1-19, boot, no surgery    DDD (degenerative disc disease), cervical 07/14/2016    H/O Recurrent Asthmatic Bronchitis 12/06/2012    History of endometrial cancer 04/01/2020    Grade 1 Endometrioid Carcinoma, S/P Total Lap Hysterectomy-BSO 9-2015; Gyn Dr Ismael Nagy, Dr Ashely Novak      Mild intermittent asthma without complication 14/39/3481    2-3 x a year triggered by peak allergy time or URI's    Mixed hyperlipidemia 8/26/2019    Moderate persistent asthma without complication 67/16/1885    3-2020    OA (osteoarthritis) of left knee 12/06/2012    Perennial allergic rhinitis with seasonal variation 05/09/2019    Peaks spring and fall    Pneumonia 12/06/2012    Postmenopause, LMP 44 yo, No HRT 12/06/2012    Prediabetes 09/12/2020    Screening exams for skin cancer 03/30/2016    Dermatologist routine checkes    Vitamin D deficiency 12/23/2012     Past Surgical History:   Procedure Laterality Date    HX BREAST BIOPSY  12/17/2012    right breast, benign, calcifications; Keon Slate; Fibrosis    HX COLONOSCOPY  04/14/2015    Normal, follow up 10 yrs, Dr Samantha Menjivar Left 08/2016    Dr Clint Piña  2014    [facility] removed from right upper back    HX TOTAL LAPAROSCOPIC HYSTERECTOMY W/ BS&O  09/24/2015    Total Lap Hysterectomy and BSO; Endometrial cancer; grade 1 endometrioid adenocarcinoma.; Dr. Alice Beach, 23 yo     Family History   Problem Relation Age of Onset    Kidney Disease Mother         kidney transplant age 77yo ;  renal failure age 80    Diabetes Mother 36    High Cholesterol Mother     OSTEOARTHRITIS Mother         B knee replacements in her 63's    Heart Disease Father     Diabetes Father     Dementia Father         Alzheimer's;  after fall and hip fx    Heart Surgery Father         CABG in his 63's    Arthritis Father         hand arthritis and Dupytrens Contractures    No Known Problems Son     Allergic Rhinitis Daughter     No Known Problems Daughter     Hypertension Brother     Hypertension Brother     Diabetes Brother         mild, controlling w/ diet    Diabetes Maternal Grandfather     Heart Attack Maternal Grandfather 62         MI      Social History     Socioeconomic History    Marital status:      Spouse name: Not on file    Number of children: 3    Years of education: Not on file    Highest education level: Not on file   Occupational History    Occupation:  at [employer], Retired Summer    Tobacco Use    Smoking status: Former     Types: Cigarettes     Quit date: 1985     Years since quittin.2    Smokeless tobacco: Never    Tobacco comments:     used to be a light smoker x 3 yrs   Substance and Sexual Activity    Alcohol use: Yes     Comment: very seldom, maybe 1-2 glasses of wine or beer a month at most    Drug use: No    Sexual activity: Yes     Partners: Male     Comment: Postmenopause   Other Topics Concern     Service Not Asked    Blood Transfusions Not Asked    Caffeine Concern No     Comment: 1 cup of coffee  qd    Occupational Exposure Not Asked    Hobby Hazards Not Asked    Sleep Concern Not Asked    Stress Concern Not Asked    Weight Concern Yes     Comment: wants to lose wt    Special Diet Yes     Comment: since 8-2020 cut out processed foods, cut back on junk food, cut out sodas, not addiding salt    Back Care Not Asked    Exercise No     Comment: was swimming some over the summer but not lately    Bike Helmet Not Asked    Seat Belt Not Asked    Self-Exams Not Asked   Social History Narrative    , 3 children (2 daughters, 1 son) (youngest daughter in [place], oldest daughter in [facility], son local)    4 grand daughters, 1 grand daughter on the way and 1 grand son on the way    Former  at [employer]    Retired summer 2022    Enjoys knitting and sewing daily        Diet: nothing special, admits her eating habits are not good    Caffeine: 1 cup of coffee qam, no tea or sodas    Exercise: nothing since the summer, was walking 2-3 x a week x 30 minutes; stays active and tries to get in at least 8-10 k steps per day; plans to join [employer] so she can start swimming again    Weight: 220's-230's over the years         Social Determinants of Health     Financial Resource Strain: Not on file   Food Insecurity: Not on file   Transportation Needs: Not on file   Physical Activity: Not on file   Stress: Not on file   Social Connections: Not on file   Intimate Partner Violence: Not on file   Housing Stability: Not on file     Patient Active Problem List   Diagnosis Code    Postmenopause, LMP 44 yo, No HRT Z78.0    Seasonal allergic rhinitis J30.2    Pneumonia J18.9    Chronic depression F32. A    H/O Recurrent Asthmatic Bronchitis J45.909    OA (osteoarthritis) of left knee M17.9    Vitamin D deficiency E55.9    Baker's cyst M71.20    Avulsion fracture of right ankle S82.899A    Screening exams for skin cancer Z12.83    DDD (degenerative disc disease), cervical M50.30    Cervical spinal stenosis w/ right radiculopathy M48.02    Gross hematuria R31.0    Mild intermittent asthma without complication N96.72    Perennial allergic rhinitis with seasonal variation J30.89, J30.2    Closed fracture of left ankle S82.892A    Mixed hyperlipidemia E78.2    Moderate persistent asthma without complication H21.88    History of endometrial cancer Z85.42    Prediabetes R73.03          Current Medications/Allergies   Medications and Allergies reviewed:    Current Outpatient Medications   Medication Sig Dispense Refill    methylPREDNISolone (MEDROL DOSEPACK) 4 mg tablet Take as instructed on dose pack 1 Dose Pack 0    cyclobenzaprine (FLEXERIL) 5 mg tablet Take 1 Tablet by mouth three (3) times daily as needed for Muscle Spasm(s). 21 Tablet 0    cetirizine (ZYRTEC) 10 mg tablet Take 10 mg by mouth daily. Indications: non-seasonal allergic stuffy and runny nose      montelukast (SINGULAIR) 10 mg tablet TAKE 1 TABLET BY MOUTH EVERY DAY 90 Tablet 3    atorvastatin (LIPITOR) 10 mg tablet TAKE 1 TABLET BY MOUTH EVERY DAY 90 Tablet 2    buPROPion XL (WELLBUTRIN XL) 300 mg XL tablet TAKE 1 TABLET BY MOUTH EVERY DAY 90 Tablet 2    meloxicam (MOBIC) 7.5 mg tablet Take 7.5 mg by mouth daily. cholecalciferol, vitamin D3, (VITAMIN D3 PO) Take 1,000 Units by mouth daily. albuterol sulfate (ProAir RespiClick) 90 mcg/actuation aepb Take 2 Puffs by inhalation every six (6) hours as needed for Wheezing or Cough. 1 Inhaler 0    multivitamin (ONE A DAY) tablet Take 1 Tab by mouth daily.  Includes Vitamin D 1,000 units       Allergies   Allergen Reactions    Sulfa (Sulfonamide Antibiotics) Other (comments)     Causes migraines    Tramadol Itching

## 2023-03-30 ENCOUNTER — HOSPITAL ENCOUNTER (OUTPATIENT)
Dept: PHYSICAL THERAPY | Age: 63
End: 2023-03-30
Payer: COMMERCIAL

## 2023-03-30 PROCEDURE — 97161 PT EVAL LOW COMPLEX 20 MIN: CPT | Performed by: PHYSICAL THERAPIST

## 2023-03-30 PROCEDURE — 97110 THERAPEUTIC EXERCISES: CPT | Performed by: PHYSICAL THERAPIST

## 2023-03-30 PROCEDURE — 97014 ELECTRIC STIMULATION THERAPY: CPT | Performed by: PHYSICAL THERAPIST

## 2023-03-30 NOTE — PROGRESS NOTES
[facility]  [address], [place], [address]  Phone: [phone]  Fax: [phone]    PT INITIAL EVALUATION NOTE - [facility] 2-15    Patient Name: Chavez Stevens  Date:3/30/2023  : 1960  [x]  Patient  Verified  Payor: BLUE CROSS / Plan: [address] [place] [address] / Product Type: PPO /    In time:840 A  Out time:930 A  Total Treatment Time (min): 50  Total Timed Codes (min): 15  1:1 Treatment Time ( only): 50   Visit #: 1     Treatment Area: Left shoulder pain [M25.512]    SUBJECTIVE    Any medication changes, allergies to medications, adverse drug reactions, diagnosis change, or new procedure performed?: [] No    [x] Yes (see summary sheet for update)    Current symptoms/chief complaint and date of onset/injury:   Pt presents with L shoulder pain. Symptoms since Dec 2022/2023. No injury; gradually worsening. She reports \"ache, soreness\" in upper L arm. Symptoms are inc'd at night and preventing her from sleeping. Her arm aches into her collarbone. She has been trying to use pillows to support her arm. She tried to swim  R hand dominant. Per CC x-ray on 23  3 views of the left shoulder demonstrate no acute fracture or subluxation. The acromioclavicular joint is intact. Moderate degenerative changes of the [facility] joint and mild degenerative change of the glenohumeral joint. Aggravated by:  movement  Eased by: \"no much\"    Pain Level (0-10 scale): 7/10 max  3/10 today/min  Location of symptoms: L shoulder   PMH: Significant for see chart  Social/Recreation/Work: Retired. Lives with . Helps with grandchildren.   Prior level of function: gradually worsening symptoms over the past 3-4 mo  Patient goal(s): \"ROM, reduction of pain\"     Objective:      Observation/posture: L ant tilt scap in static stance    ROM:                                               AROM                                                                PROM   Right Left Right Left   Flexion  (p!) Flexion WNL WNL   ABD WNL WNL ABD WNL WNL   ER @ 90 Degrees - - ER @ 90 Degrees  >90 deg   IR @ 90 Degrees - - IR @ 90 Degrees  WNL   Functional ER ~T4 ~T6      Functional IR ~T3 ~T3                Strength:                                                                            R (1-5) L (1-5) Comments   Shoulder Flexion 5 4 P! Shoulder Scaption 5 4 P! Shoulder Abduction 5 4 P! Shoulder ER 4+ 4- P! Shoulder IR 4+ 4-    Elbow Flexion 5 4 P! Palpation:  max TTP L biceps tendon, mm belly  Mod TTP L infra, supraspinatus mm belly    Special Tests:  Painful arc  [] Pos   [x] Neg    Hawkin's Test  [x] Pos   [] Neg   Empty Can  [x] Pos   [] Neg  Full Can   [x] Pos   [] Neg    Speed's Test  [x] Pos   [] Neg   Apprehension  [] Pos   [x] Neg        Outcome Measure: Patient presents with an initial FOTO score of not yet complete. Today's Treatment[de-identified]    15 min Therapeutic Exercise:  [x] See flow sheet : instructed in HEP   Rationale: increase ROM and increase strength to improve the patients ability to perform ADL's, daily chores with dec'd symptoms    10 min --Estim, IFC + ice. L shoulder. Pt sitting with pillow under L UE for support. With   [x] TE   [] TA   [] neuro   [] other: Patient Education: [x] Review HEP    [] Progressed/Changed HEP based on:   [] positioning   [] body mechanics   [] transfers   [x] heat/ice application    [x] other: richard/phys; PT POC; importance of performing HEP in order to maximize benefit of PT; use of pillows while sleeping for symptom management ; use of ice 2-3x/day; purchasing home TENS unit? Pain Level (0-10 scale) post treatment: \"good! \"    Assessment:   [x] See POC    Plan:    [x] See Herb Parry PT, DPT   3/30/2023 8:36 AM

## 2023-03-30 NOTE — THERAPY EVALUATION
Physical Therapy at [facility],   [facility]  [address]  [place], [address]  Phone: [phone]  Fax: [phone]    Plan of Care/Statement of Necessity for Physical Therapy Services  2-15    Patient name: Reece Harding  : 1960  Provider#: [id number]  Referral source: Jigar Adler NP      Medical/Treatment Diagnosis: Left shoulder pain [M25.512]     Prior Hospitalization: see medical history     Comorbidities: see evaluation  Prior Level of Function: see evaluation  Medications: Verified on Patient Summary List    Start of Care: 3/30/23      Onset Date: ~3-4 mo ago       The Plan of Care and following information is based on the information from the initial evaluation. Assessment/ key information: Pt is a 60 yr old female presenting with acute, insidious onset of L shoulder pain ~3-4 mo ago. She demonstrates dec'd ROM, strength, TTP effecting ability to perform ADL's and sleep at night.  She will benefit from PT to address problem list below    Evaluation Complexity History LOW Complexity : Zero comorbidities / personal factors that will impact the outcome / POC; Examination LOW Complexity : 1-2 Standardized tests and measures addressing body structure, function, activity limitation and / or participation in recreation  ;Presentation LOW Complexity : Stable, uncomplicated  ;Clinical Decision Making MEDIUM Complexity : FOTO score of 26-74  Overall Complexity Rating: LOW     Problem List: pain affecting function, decrease ROM, decrease strength, decrease ADL/ functional abilitiies, decrease activity tolerance, and decrease flexibility/ joint mobility   Treatment Plan may include any combination of the following: Therapeutic exercise, Neuromuscular reeducation, Manual therapy, Therapeutic activity, Self care/home management, Electric stim unattended , Ultrasound, Needle insertion w/o injection (1 or 2 muscles), and Needle insertion w/o injection (3+ muscles)  Patient / Family readiness to learn indicated by: asking questions, trying to perform skills, and interest  Persons(s) to be included in education: patient (P)  Barriers to Learning/Limitations: None  Patient Goal (s): see evaluation  Patient Self Reported Health Status: good  Rehabilitation Potential: good    Short Term Goals: To be accomplished in 3-4 weeks:  1) Pt will be I in initial HEP  2) Pt will report >/=25% Improvement in symptoms  3) Pt will report compliance with modalities for pain management    Long Term Goals: To be accomplished in 10-12 weeks:  1) Pt will dem >/=140 deg AROM L shoulder flex in order to assist with reaching overhead  2) Pt will dem >/=4+/5 L shoulder flex, scap strength in order to assist with lifting objects  3) Pt will report being able to sleep at night without being awoke from L shoulder pain  4) Pt will be I in final HEP  5) Pt will report >/=75% improvement in symptoms    Frequency / Duration: Patient to be seen 1-2 times per week for 10-12 weeks. Patient/ Caregiver education and instruction: self care, activity modification, and exercises    [x]  Plan of care has been reviewed with KARMEN Swann, PT 3/30/2023   ________________________________________________________________________    I certify that the above Therapy Services are being furnished while the patient is under my care. I agree with the treatment plan and certify that this therapy is necessary.     Physician's Signature:____________________  Date:____________Time: _________      Elissa Pace NP

## 2023-04-03 ENCOUNTER — APPOINTMENT (OUTPATIENT)
Dept: PHYSICAL THERAPY | Age: 63
End: 2023-04-03
Payer: COMMERCIAL

## 2023-04-17 ENCOUNTER — HOSPITAL ENCOUNTER (OUTPATIENT)
Dept: PHYSICAL THERAPY | Age: 63
Discharge: HOME | End: 2023-04-17
Payer: COMMERCIAL

## 2023-04-17 PROCEDURE — 97140 MANUAL THERAPY 1/> REGIONS: CPT | Performed by: PHYSICAL THERAPIST

## 2023-04-17 PROCEDURE — 97035 APP MDLTY 1+ULTRASOUND EA 15: CPT | Performed by: PHYSICAL THERAPIST

## 2023-04-17 PROCEDURE — 97110 THERAPEUTIC EXERCISES: CPT | Performed by: PHYSICAL THERAPIST

## 2023-04-17 NOTE — PROGRESS NOTES
PT DAILY TREATMENT/progress NOTE - [facility] 2-15    Patient Name: Jordyn Harrington  Date:2023  : 1960  [x]  Patient  Verified  Payor: BLUE CROSS / Plan: [address] [place] [address] / Product Type: PPO /    In time: 930 A  Out time: 1030 A  Total Treatment Time (min): 60  Total Timed Codes (min): 50  1:1 Treatment Time ( only): 40   Visit #:  4    Treatment Area: Left shoulder pain [M25.512]    SUBJECTIVE  Pain Level (0-10 scale): 3  Any medication changes, allergies to medications, adverse drug reactions, diagnosis change, or new procedure performed?: [x] No    [] Yes (see summary sheet for update)  Subjective functional status/changes:   [x] No changes reported  She has appointment with Dr. Jama Loaiza on Thursday. States that she pulled a muscle in her R mid/lower back on Saturday so she took a muscle relaxer. Her shoulder has been feeling a bit better the past few days and is wondering if the muscle relaxer is contributing to dec'd symptoms.     OBJECTIVE    Modality rationale: decrease inflammation and decrease pain to improve the patients ability to perform ADL's, daily chores with dec'd symptoms   Min Type Additional Details    -- [x] Estim: [x]Att   []Unatt    []TENS instruct                  [x]IFC  []Premod   []NMES                     []Other:  []w/US   [x]w/ice   []w/heat  Position: supine LE's elevated  Location:  L shoulder       []  Traction: [] Cervical       []Lumbar                       [] Prone          []Supine                       []Intermittent   []Continuous Lbs:  [] before manual  [] after manual  []w/heat   8 [x]  Ultrasound: [x]Continuous   [] Pulsed                       at: [x]1MHz   []3MHz Location: L biceps  W/cm2: 1.2    [] Paraffin         Location:   []w/heat   10 [x]  Ice     []  Heat  []  Ice massage Position: seated  Location: L shoulder    []  Laser  []  Other: Position:  Location:      []  Vasopneumatic Device Pressure:       [] lo [] med [] hi   Temperature: [x] Skin assessment post-treatment:  [x]intact []redness- no adverse reaction    []redness - adverse reaction:     25 min Therapeutic Exercise:  [x] See flow sheet :   Rationale: increase ROM, increase strength, and improve coordination to improve the patients ability to perform ADL's, daily chores with dec'd symptoms      15 min Manual Therapy: STM L biceps tendon,mm belly  TPR L infra, supra mm belly  S/L scap mobs  PROM L shoulder to tolerance   Rationale: decrease pain, increase ROM, increase tissue extensibility, and decrease trigger points to improve the patients ability to perform ADL's, daily chores with dec'd symptoms            With   [] TE   [] TA   [] Neuro   [] SC   [] other: Patient Education: [x] Review HEP    [] Progressed/Changed HEP based on:   [] positioning   [] body mechanics   [] transfers   [] heat/ice application    [] other:      Other Objective/Functional Measures:   AROM L shoulder flex ~90 deg, p! AROM L shoulder abd WNL  PROM L shoulder flex WNL, p! From ~ deg  PROM L shoulder ER WNL  L shoulder ER 3+/5, p! L shoulder IR 4/5, slight p! Mod/max TTP L biceps tendon and mm belly; L supra, infra mm belly    Pain Level (0-10 scale) post treatment: not captured    ASSESSMENT/Changes in Function:   Pt has completed 4 skilled PT visits. Although symptoms are slightly improved compared to last week, she still continues to dem dec'd ROM, inc'd p!, inc'd TTP limiting ability to perform ADL's, daily chores. PT has included therapeutic exercises, manual techniques, ultrasound, estim and ice. She has appointment with ortho MD on Thursday and returning to PT on Friday.     Patient will continue to benefit from skilled PT services to modify and progress therapeutic interventions, address functional mobility deficits, address ROM deficits, address strength deficits, analyze and address soft tissue restrictions, analyze and cue movement patterns, analyze and modify body mechanics/ergonomics, and assess and modify postural abnormalities to attain remaining goals.      [x]  See Plan of Care  []  See progress note/recertification  []  See Discharge Summary         Progress towards goals / Updated goals:  NT    PLAN  [x]  Upgrade activities as tolerated     [x]  Continue plan of care  []  Update interventions per flow sheet       []  Discharge due to:_  []  Other:_      Scott Abarca, PT 4/17/2023

## 2023-04-19 ENCOUNTER — APPOINTMENT (OUTPATIENT)
Dept: PHYSICAL THERAPY | Age: 63
End: 2023-04-19
Payer: COMMERCIAL

## 2023-04-19 NOTE — PROGRESS NOTES
Radha Powers (: 1960) is a 60 y.o. female, patient, here for evaluation of the following chief complaint(s):  Shoulder Pain (Left)       HPI:    She began having increased left shoulder pain the first week of January when she was hauling her Josh tree to the car and putting her [place] decorations back in the attic. She describes her left shoulder pain now is moderate to severe, dull, aching, and intermittent. She states that her pain is worse at night. Her left shoulder pain does make it very difficult for her to go to sleep and does wake her up from sleep. She has been experiencing some weakness in her left shoulder and upper extremity. The patient states that her pain continues to get worse. She reports that lifting, exercise, and lying in bed makes pain worse and rest and ice make her pain better. She has been taking meloxicam and Tylenol for discomfort as needed. She has been to formal therapy for left shoulder pain. She was not seen in the emergency room for her left shoulder pain. She was seen by her primary care physician prior to her visit today. The patient did have x-rays performed by her PCP which were independently reviewed. She reports no previous or related left shoulder surgery. Left shoulder x-rays from an outside facility were independently reviewed and they show no evidence of a fracture or dislocation. Evidence of mild impingement. Evidence of moderate AC joint arthritis. Glenohumeral joint spaces well-maintained. Allergies   Allergen Reactions    Sulfa (Sulfonamide Antibiotics) Other (comments)     Causes migraines    Tramadol Itching       Current Outpatient Medications   Medication Sig    methylPREDNISolone (MEDROL DOSEPACK) 4 mg tablet Take as instructed on dose pack    cyclobenzaprine (FLEXERIL) 5 mg tablet Take 1 Tablet by mouth three (3) times daily as needed for Muscle Spasm(s).     montelukast (SINGULAIR) 10 mg tablet TAKE 1 TABLET BY MOUTH EVERY DAY    atorvastatin (LIPITOR) 10 mg tablet TAKE 1 TABLET BY MOUTH EVERY DAY    buPROPion XL (WELLBUTRIN XL) 300 mg XL tablet TAKE 1 TABLET BY MOUTH EVERY DAY    meloxicam (MOBIC) 7.5 mg tablet Take 7.5 mg by mouth daily. cholecalciferol, vitamin D3, (VITAMIN D3 PO) Take 1,000 Units by mouth daily. albuterol sulfate (ProAir RespiClick) 90 mcg/actuation aepb Take 2 Puffs by inhalation every six (6) hours as needed for Wheezing or Cough.    multivitamin (ONE A DAY) tablet Take 1 Tab by mouth daily. Includes Vitamin D 1,000 units    cetirizine (ZYRTEC) 10 mg tablet Take 10 mg by mouth daily. Indications: non-seasonal allergic stuffy and runny nose     No current facility-administered medications for this visit.        Past Medical History:   Diagnosis Date    Avulsion fracture of right ankle 07/10/2015    ~6-2015, [facility], boot    Nguyen's cyst 12/09/2013    Left knee; 12/6/13, Dr Alexsandra Moore    Cervical spinal stenosis w/ right radiculopathy 07/14/2016 2016 Dr Yvette Rivera, PT    Chronic depression 12/06/2012    Closed fracture of left ankle 08/26/2019    6-1-19, boot, no surgery    DDD (degenerative disc disease), cervical 07/14/2016    H/O Recurrent Asthmatic Bronchitis 12/06/2012    History of endometrial cancer 04/01/2020    Grade 1 Endometrioid Carcinoma, S/P Total Lap Hysterectomy-BSO 9-2015; [facility], Dr Simón Miranda      Mild intermittent asthma without complication 23/18/7444    2-3 x a year triggered by peak allergy time or URI's    Mixed hyperlipidemia 8/26/2019    Moderate persistent asthma without complication 50/57/1830    3-2020    OA (osteoarthritis) of left knee 12/06/2012    Perennial allergic rhinitis with seasonal variation 05/09/2019    Peaks spring and fall    Pneumonia 12/06/2012    Postmenopause, LMP 44 yo, No HRT 12/06/2012    Prediabetes 09/12/2020    Screening exams for skin cancer 03/30/2016    Dermatologist routine checkes    Vitamin D deficiency 12/23/2012        Past Surgical History:   Procedure Laterality Date    HX BREAST BIOPSY  2012    right breast, benign, calcifications; [facility] [address] [place]; Fibrosis    HX COLONOSCOPY  2015    Normal, follow up 10 yrs, Dr Joe Wilkinson Left 2016    Dr Asim Brown SKIN BIOPSY      [address] from right upper back    HX TOTAL LAPAROSCOPIC HYSTERECTOMY W/ BS&O  2015    Total Lap Hysterectomy and BSO; Endometrial cancer; grade 1 endometrioid adenocarcinoma.; Dr. Conde Lipoma, 23 yo       Family History   Problem Relation Age of Onset    Kidney Disease Mother         kidney transplant age 77yo ;  renal failure age 80    Diabetes Mother 36    High Cholesterol Mother     OSTEOARTHRITIS Mother         B knee replacements in her 63's    Heart Disease Father     Diabetes Father     Dementia Father         Alzheimer's;  after fall and hip fx    Heart Surgery Father         CABG in his 63's    Arthritis Father         hand arthritis and Dupytrens Contractures    No Known Problems Son     Allergic Rhinitis Daughter     No Known Problems Daughter     Hypertension Brother     Hypertension Brother     Diabetes Brother         mild, controlling w/ diet    Diabetes Maternal Grandfather     Heart Attack Maternal Grandfather 62         MI         Social History     Socioeconomic History    Marital status:      Spouse name: Not on file    Number of children: 3    Years of education: Not on file    Highest education level: Not on file   Occupational History    Occupation:  at [employer], [address] [place] [address], Retired Summer 2022   Tobacco Use    Smoking status: Former     Types: Cigarettes     Quit date: 1985     Years since quittin.3    Smokeless tobacco: Never    Tobacco comments:     used to be a light smoker x 3 yrs   Substance and Sexual Activity    Alcohol use: Yes     Comment: very seldom, maybe 1-2 glasses of wine or beer a month at most Drug use: No    Sexual activity: Yes     Partners: Male     Comment: Postmenopause   Other Topics Concern     Service Not Asked    Blood Transfusions Not Asked    Caffeine Concern No     Comment: 1 cup of coffee  qd    Occupational Exposure Not Asked    Hobby Hazards Not Asked    Sleep Concern Not Asked    Stress Concern Not Asked    Weight Concern Yes     Comment: wants to lose wt    Special Diet Yes     Comment: since 8-2020 cut out processed foods, cut back on junk food, cut out sodas, not addiding salt    Back Care Not Asked    Exercise No     Comment: was swimming some over the summer but not lately    Bike Helmet Not Asked    Seat Belt Not Asked    Self-Exams Not Asked   Social History Narrative    , 3 children (2 daughters, 1 son) (youngest daughter in [place], oldest daughter in [facility], son local)    4 grand daughters, 1 grand daughter on the way and 1 grand son on the way    Former  at [employer]    Retired summer 2022    Enjoys knitting and sewing daily        Diet: nothing special, admits her eating habits are not good    Caffeine: 1 cup of coffee qam, no tea or sodas    Exercise: nothing since the summer, was walking 2-3 x a week x 30 minutes; stays active and tries to get in at least 8-10 k steps per day; plans to join [employer] so she can start swimming again    Weight: 220's-230's over the years         Social Determinants of Health     Financial Resource Strain: Not on file   Food Insecurity: Not on file   Transportation Needs: Not on file   Physical Activity: Not on file   Stress: Not on file   Social Connections: Not on file   Intimate Partner Violence: Not on file   Housing Stability: Not on file       Review of Systems   All other systems reviewed and are negative. Vitals:  Ht 5' 9\" (1.753 m)   Wt 210 lb (95.3 kg)   LMP 05/09/2011 (Within Years)   BMI 31.01 kg/m²    Body mass index is 31.01 kg/m².     Ortho Exam     The patient is well-developed and well-nourished. The patient presents today in alert and oriented x3 with a normal mood and affect. The patient stands with a normal weightbearing line and walks with a normal gait. Left shoulder: No shoulder girdle atrophy. There is no soft tissue swelling, ecchymosis, abrasions, or lacerations. Active range of motion of the shoulder is limited to 130 degrees of forward flexion, 120 degrees of lateral abduction, and 70 degrees of external rotation. Internal rotation is to the SI joint and is painful. Passive range of motion is full with a painful impingement sign and painful Clayton sign. Rotator cuff strength is painful to evaluate and is a 4+/5 with forward flexion and lateral abduction. External rotation strength is maintained. There is no crepitation about the joint. Palpation of the [facility] joint does reproduce discomfort and there is pain elicited with cross body abduction. Strength of the extremity is 5/5 strength at biceps/triceps/wrist extension and is comparable to the contralateral side. DTRs are intact at +2/4 and are symmetrical.  Cervical range of motion is full with no pain to palpation along the paraspinal musculature medial border of the scapula. Spurling's sign is negative. ASSESSMENT/PLAN:      1. Chronic left shoulder pain  2. Pain of left shoulder region  3. Traumatic tear of left rotator cuff, unspecified tear extent, initial encounter  -     MRI SHOULDER LT WO CONT; Future  4. Shoulder impingement, left  -     MRI SHOULDER LT WO CONT; Future  5. Arthritis of left acromioclavicular joint  -     MRI SHOULDER LT WO CONT; Future       Below is the assessment and plan developed based on review of pertinent history, physical exam, labs, studies, and medications.     We discussed the patient's left shoulder pain and her signs, symptoms, physical exam, description of her pain, description of her injury, and outside x-rays that were independently reviewed are consistent with a traumatic rotator cuff tear, mild impingement, and moderate AC joint arthritis. The possible treatment options were discussed with the patient and because of the over 4-month long duration of her increased pain, no improvement with multiple modalities of conservative management including both formal physical therapy and an at-home exercise program, her physical exam, description of her pain, description of her injury, outside x-rays that were independently reviewed, and her inability to complete daily living activities without significant discomfort we elected to obtain an MRI of her left shoulder to further evaluate the severity of her traumatic rotator cuff tear, mild impingement, and moderate AC joint thrice. The MRI images and results will be used in preoperative planning if and almost certainly when surgical intervention is necessary. The risks and benefits of the MRI were discussed in detail with the patient and she would like to proceed. We will schedule this at her convenience. I will see her back after MRI is complete to discuss the images, results, and further treatment options. In the interim, I did encourage her to ice when possible, modify her activity level based on her left shoulder pain, and use anti-inflammatory medication when necessary. She will work on range of motion, strengthening, and stretching exercises with an at-home exercise program as pain tolerates. I will see her back as noted above after left shoulder MRI is complete. **We will obtain an MRI for more information to determine the best treatment plan moving forward and help us prepare for surgical intervention if necessary. **    Return for After her left shoulder MRI is complete. An electronic signature was used to authenticate this note.   -- Harjeet Vazquez MD

## 2023-04-20 ENCOUNTER — OFFICE VISIT (OUTPATIENT)
Dept: ORTHOPEDIC SURGERY | Age: 63
End: 2023-04-20
Payer: COMMERCIAL

## 2023-04-20 VITALS — WEIGHT: 210 LBS | BODY MASS INDEX: 31.1 KG/M2 | HEIGHT: 69 IN

## 2023-04-20 DIAGNOSIS — M25.512 CHRONIC LEFT SHOULDER PAIN: Primary | ICD-10-CM

## 2023-04-20 DIAGNOSIS — M19.012 ARTHRITIS OF LEFT ACROMIOCLAVICULAR JOINT: ICD-10-CM

## 2023-04-20 DIAGNOSIS — G89.29 CHRONIC LEFT SHOULDER PAIN: Primary | ICD-10-CM

## 2023-04-20 DIAGNOSIS — M25.512 PAIN OF LEFT SHOULDER REGION: ICD-10-CM

## 2023-04-20 DIAGNOSIS — S46.012A TRAUMATIC TEAR OF LEFT ROTATOR CUFF, UNSPECIFIED TEAR EXTENT, INITIAL ENCOUNTER: ICD-10-CM

## 2023-04-20 DIAGNOSIS — M25.812 SHOULDER IMPINGEMENT, LEFT: ICD-10-CM

## 2023-04-20 PROCEDURE — 99204 OFFICE O/P NEW MOD 45 MIN: CPT | Performed by: ORTHOPAEDIC SURGERY

## 2023-04-21 ENCOUNTER — APPOINTMENT (OUTPATIENT)
Dept: PHYSICAL THERAPY | Age: 63
End: 2023-04-21
Payer: COMMERCIAL

## 2023-04-21 DIAGNOSIS — J45.40 MODERATE PERSISTENT ASTHMA WITHOUT COMPLICATION: ICD-10-CM

## 2023-04-21 NOTE — TELEPHONE ENCOUNTER
Patient mychart request for refill ProAir Respiclick. Thanks, Caroline    Last Visit: VV 3/27/23 MD [place] Other  Next Appointment: 5/1/23 MD CERON  Previous Refill Encounter(s): 3/21/20 1    Requested Prescriptions     Pending Prescriptions Disp Refills    albuterol sulfate (ProAir RespiClick) 90 mcg/actuation breath activated inhaler 1 Each 0     Sig: Take 2 Puffs by inhalation every six (6) hours as needed for Wheezing or Cough.      For Pharmacy Admin Tracking Only    Program: Medication Refill  Intervention Detail: New Rx: 1, reason: Patient Preference  Time Spent (min): 5

## 2023-04-24 ENCOUNTER — HOSPITAL ENCOUNTER (OUTPATIENT)
Dept: PHYSICAL THERAPY | Age: 63
Discharge: HOME | End: 2023-04-24
Payer: COMMERCIAL

## 2023-04-24 PROCEDURE — 97035 APP MDLTY 1+ULTRASOUND EA 15: CPT | Performed by: PHYSICAL THERAPIST

## 2023-04-24 PROCEDURE — 97140 MANUAL THERAPY 1/> REGIONS: CPT | Performed by: PHYSICAL THERAPIST

## 2023-04-24 NOTE — PROGRESS NOTES
PT DAILY TREATMENT NOTE - [facility] 2-15    Patient Name: Regla Valero  Date:2023  : 1960  [x]  Patient  Verified  Payor: BLUE CROSS / Plan: [address] [place] [address] / Product Type: PPO /    In time: 10:10 A  Out time: 1100 A  Total Treatment Time (min): 50  Total Timed Codes (min): 40  1:1 Treatment Time ( only): 25  Visit #:  5    Treatment Area: Left shoulder pain [M25.512]    SUBJECTIVE  Pain Level (0-10 scale): 3  Any medication changes, allergies to medications, adverse drug reactions, diagnosis change, or new procedure performed?: [x] No    [] Yes (see summary sheet for update)  Subjective functional status/changes:   [x] No changes reported  MRI has been ordered; she has it scheduled for . She will be out of town in [place], visiting her daughter/granddaughter next week.   Her shoulder has continued to bother her    OBJECTIVE    Modality rationale: decrease inflammation and decrease pain to improve the patients ability to perform ADL's, daily chores with dec'd symptoms   Min Type Additional Details    -- [x] Estim: [x]Att   []Unatt    []TENS instruct                  [x]IFC  []Premod   []NMES                     []Other:  []w/US   [x]w/ice   []w/heat  Position: supine LE's elevated  Location:  L shoulder       []  Traction: [] Cervical       []Lumbar                       [] Prone          []Supine                       []Intermittent   []Continuous Lbs:  [] before manual  [] after manual  []w/heat   8 [x]  Ultrasound: [x]Continuous   [] Pulsed                       at: [x]1MHz   []3MHz Location: L biceps  W/cm2: 1.2    [] Paraffin         Location:   []w/heat   10 [x]  Ice     []  Heat  []  Ice massage Position: seated  Location: L shoulder    []  Laser  []  Other: Position:  Location:      []  Vasopneumatic Device Pressure:       [] lo [] med [] hi   Temperature:      [x] Skin assessment post-treatment:  [x]intact []redness- no adverse reaction    []redness - adverse reaction: 20 min Therapeutic Exercise:  [x] See flow sheet :   Rationale: increase ROM, increase strength, and improve coordination to improve the patients ability to perform ADL's, daily chores with dec'd symptoms      10 min Manual Therapy: STM L biceps tendon,mm belly  TPR L infra, supra mm belly  S/L scap mobs  PROM L shoulder to tolerance   Rationale: decrease pain, increase ROM, increase tissue extensibility, and decrease trigger points to improve the patients ability to perform ADL's, daily chores with dec'd symptoms            With   [] TE   [] TA   [] Neuro   [] SC   [] other: Patient Education: [x] Review HEP    [] Progressed/Changed HEP based on:   [] positioning   [] body mechanics   [] transfers   [] heat/ice application    [] other:      Other Objective/Functional Measures:   N/a    Pain Level (0-10 scale) post treatment: not captured    ASSESSMENT/Changes in Function:   Due to continued high pain levels, plan for her to hold from PT until after MRI results. We will await next steps from MD.     Patient will continue to benefit from skilled PT services to modify and progress therapeutic interventions, address functional mobility deficits, address ROM deficits, address strength deficits, analyze and address soft tissue restrictions, analyze and cue movement patterns, analyze and modify body mechanics/ergonomics, and assess and modify postural abnormalities to attain remaining goals.      [x]  See Plan of Care  []  See progress note/recertification  []  See Discharge Summary         Progress towards goals / Updated goals:  NT    PLAN  [x]  Upgrade activities as tolerated     [x]  Continue plan of care  []  Update interventions per flow sheet       []  Discharge due to:_  []  Other:_      Yina Snowden, PT 4/24/2023

## 2023-04-26 ENCOUNTER — APPOINTMENT (OUTPATIENT)
Dept: PHYSICAL THERAPY | Age: 63
End: 2023-04-26
Payer: COMMERCIAL

## 2023-04-28 NOTE — TELEPHONE ENCOUNTER
----- Message from Jone King sent at 4/28/2023  2:26 PM EDT -----  Subject: Message to Provider    QUESTIONS  Information for Provider? Patient stated that her insurance([employer])   does not cover albuterol sulfate (PROAIR RESPICLICK) 693 (90 Base) MCG/ACT   aerosol powder inhalation. She stated that the alternative medication is   ProAir HFA. Patient is requesting that a prescription for the ProAir HFA   be sent to [facility] pharmacy. Can you please contact patient once prescription   has been sent to [facility]?  ---------------------------------------------------------------------------  --------------  [employer]  [phone]; OK to leave message on voicemail  ---------------------------------------------------------------------------  --------------  SCRIPT ANSWERS  Relationship to Patient?  Self

## 2023-04-29 RX ORDER — ALBUTEROL SULFATE 90 UG/1
1 AEROSOL, METERED RESPIRATORY (INHALATION)
Qty: 18 G | Refills: 1 | Status: SHIPPED | OUTPATIENT
Start: 2023-04-29

## 2023-05-30 ENCOUNTER — TELEPHONE (OUTPATIENT)
Age: 63
End: 2023-05-30

## 2023-06-14 PROBLEM — M75.122 COMPLETE TEAR OF LEFT ROTATOR CUFF: Status: ACTIVE | Noted: 2023-05-30

## 2023-06-14 PROBLEM — S43.003A SUBLUXATION OF TENDON OF LONG HEAD OF BICEPS: Status: ACTIVE | Noted: 2023-05-30

## 2023-06-23 ENCOUNTER — OFFICE VISIT (OUTPATIENT)
Age: 63
End: 2023-06-23

## 2023-06-23 ENCOUNTER — TELEMEDICINE (OUTPATIENT)
Age: 63
End: 2023-06-23
Payer: COMMERCIAL

## 2023-06-23 VITALS
SYSTOLIC BLOOD PRESSURE: 134 MMHG | BODY MASS INDEX: 33.92 KG/M2 | HEIGHT: 69 IN | TEMPERATURE: 97.9 F | WEIGHT: 229 LBS | DIASTOLIC BLOOD PRESSURE: 83 MMHG | HEART RATE: 72 BPM | OXYGEN SATURATION: 99 % | RESPIRATION RATE: 16 BRPM

## 2023-06-23 DIAGNOSIS — R73.03 PREDIABETES: Primary | ICD-10-CM

## 2023-06-23 DIAGNOSIS — E78.2 MIXED HYPERLIPIDEMIA: ICD-10-CM

## 2023-06-23 DIAGNOSIS — J45.21 MILD INTERMITTENT ASTHMATIC BRONCHITIS WITH ACUTE EXACERBATION: ICD-10-CM

## 2023-06-23 DIAGNOSIS — R05.1 ACUTE COUGH: Primary | ICD-10-CM

## 2023-06-23 PROCEDURE — 99213 OFFICE O/P EST LOW 20 MIN: CPT | Performed by: FAMILY MEDICINE

## 2023-06-23 RX ORDER — METFORMIN HYDROCHLORIDE 500 MG/1
500 TABLET, EXTENDED RELEASE ORAL 2 TIMES DAILY WITH MEALS
Qty: 60 TABLET | Refills: 2 | Status: SHIPPED | OUTPATIENT
Start: 2023-06-23

## 2023-06-23 RX ORDER — BUDESONIDE AND FORMOTEROL FUMARATE DIHYDRATE 160; 4.5 UG/1; UG/1
2 AEROSOL RESPIRATORY (INHALATION) 2 TIMES DAILY
Qty: 10.2 G | Refills: 0 | Status: SHIPPED | OUTPATIENT
Start: 2023-06-23

## 2023-06-23 RX ORDER — DEXAMETHASONE SODIUM PHOSPHATE 10 MG/ML
10 INJECTION INTRAMUSCULAR; INTRAVENOUS ONCE
Qty: 1 ML | Refills: 0 | Status: SHIPPED | COMMUNITY
Start: 2023-06-23 | End: 2023-06-23

## 2023-06-23 RX ORDER — AZITHROMYCIN 250 MG/1
250 TABLET, FILM COATED ORAL SEE ADMIN INSTRUCTIONS
Qty: 6 TABLET | Refills: 0 | Status: SHIPPED | OUTPATIENT
Start: 2023-06-23 | End: 2023-06-28

## 2023-06-23 RX ORDER — IPRATROPIUM BROMIDE AND ALBUTEROL SULFATE 2.5; .5 MG/3ML; MG/3ML
1 SOLUTION RESPIRATORY (INHALATION)
Qty: 3 ML | Refills: 0 | Status: SHIPPED | COMMUNITY
Start: 2023-06-23 | End: 2023-06-23

## 2023-06-23 SDOH — ECONOMIC STABILITY: FOOD INSECURITY: WITHIN THE PAST 12 MONTHS, THE FOOD YOU BOUGHT JUST DIDN'T LAST AND YOU DIDN'T HAVE MONEY TO GET MORE.: NEVER TRUE

## 2023-06-23 SDOH — ECONOMIC STABILITY: FOOD INSECURITY: WITHIN THE PAST 12 MONTHS, YOU WORRIED THAT YOUR FOOD WOULD RUN OUT BEFORE YOU GOT MONEY TO BUY MORE.: NEVER TRUE

## 2023-06-23 SDOH — ECONOMIC STABILITY: INCOME INSECURITY: HOW HARD IS IT FOR YOU TO PAY FOR THE VERY BASICS LIKE FOOD, HOUSING, MEDICAL CARE, AND HEATING?: NOT HARD AT ALL

## 2023-06-23 SDOH — ECONOMIC STABILITY: HOUSING INSECURITY
IN THE LAST 12 MONTHS, WAS THERE A TIME WHEN YOU DID NOT HAVE A STEADY PLACE TO SLEEP OR SLEPT IN A SHELTER (INCLUDING NOW)?: NO

## 2023-06-23 ASSESSMENT — PATIENT HEALTH QUESTIONNAIRE - PHQ9
SUM OF ALL RESPONSES TO PHQ QUESTIONS 1-9: 0
2. FEELING DOWN, DEPRESSED OR HOPELESS: 0
1. LITTLE INTEREST OR PLEASURE IN DOING THINGS: 0
SUM OF ALL RESPONSES TO PHQ9 QUESTIONS 1 & 2: 0
SUM OF ALL RESPONSES TO PHQ QUESTIONS 1-9: 0

## 2023-06-23 ASSESSMENT — ENCOUNTER SYMPTOMS
CHEST TIGHTNESS: 1
WHEEZING: 1
ORTHOPNEA: 1
HEMOPTYSIS: 0
SHORTNESS OF BREATH: 1
SPUTUM PRODUCTION: 0
GASTROINTESTINAL NEGATIVE: 1

## 2023-06-23 NOTE — PROGRESS NOTES
Chief Complaint   Patient presents with    Shortness of Breath     Pt here today with complaint of ongoing symptoms for 2 weeks after being treated for bronchitis last week. She reports she was seen 6/14, given albuterol, prednisone for symptoms. Reports symptoms did improve somewhat on these medications but she is still having coughing, shortness of breath. She spoke with her PCP this morning who recommended that she be reevaluated with chest xray         Shortness of Breath  This is a new problem. The current episode started 1 to 4 weeks ago. The problem occurs constantly. The problem has been unchanged. Associated symptoms include orthopnea and wheezing. Pertinent negatives include no chest pain, hemoptysis, leg swelling or sputum production. The symptoms are aggravated by lying flat and exercise. Associated symptoms comments: Chest tightness. She has tried beta agonist inhalers and oral steroids for the symptoms. The treatment provided mild relief. Her past medical history is significant for allergies and asthma.      Past Medical History:   Diagnosis Date    Asthmatic bronchitis 12/06/2012    Avulsion fracture of ankle 07/10/2015    Howard's cyst 12/09/2013    Left knee; 12/6/13, Dr Alberto Naranjo    Cervical spinal stenosis 07/14/2016 2016 Dr Mario Tang, PT    Chronic depression 12/06/2012    Closed fracture of left ankle 08/26/2019    6-1-19, boot, no surgery    DDD (degenerative disc disease), cervical 07/14/2016    History of endometrial cancer 04/01/2020    Grade 1 Endometrioid Carcinoma, S/P Total Lap Hysterectomy-BSO 9-2015; Gyn Dr Jesse Cruz, Dr Rhonda Hu      Mild intermittent asthma without complication 82/46/2512    2-3 x a year triggered by peak allergy time or URI's    Mixed hyperlipidemia 8/26/2019    Moderate persistent asthma without complication 31/10/8587    3-2020    OA (osteoarthritis) of knee 12/06/2012    Perennial allergic rhinitis with seasonal variation 05/09/2019    [place] spring and

## 2023-07-19 ENCOUNTER — TELEPHONE (OUTPATIENT)
Age: 63
End: 2023-07-19

## 2023-07-19 ENCOUNTER — OFFICE VISIT (OUTPATIENT)
Age: 63
End: 2023-07-19

## 2023-07-19 ENCOUNTER — NURSE TRIAGE (OUTPATIENT)
Dept: OTHER | Facility: CLINIC | Age: 63
End: 2023-07-19

## 2023-07-19 VITALS
BODY MASS INDEX: 33.59 KG/M2 | DIASTOLIC BLOOD PRESSURE: 87 MMHG | OXYGEN SATURATION: 98 % | TEMPERATURE: 98.6 F | HEART RATE: 82 BPM | HEIGHT: 69 IN | WEIGHT: 226.8 LBS | SYSTOLIC BLOOD PRESSURE: 138 MMHG

## 2023-07-19 DIAGNOSIS — J98.01 COUGH DUE TO BRONCHOSPASM: ICD-10-CM

## 2023-07-19 DIAGNOSIS — K21.9 GASTROESOPHAGEAL REFLUX DISEASE, UNSPECIFIED WHETHER ESOPHAGITIS PRESENT: ICD-10-CM

## 2023-07-19 DIAGNOSIS — R05.3 CHRONIC COUGH: Primary | ICD-10-CM

## 2023-07-19 RX ORDER — PANTOPRAZOLE SODIUM 40 MG/1
40 TABLET, DELAYED RELEASE ORAL
Qty: 30 TABLET | Refills: 0 | Status: SHIPPED | OUTPATIENT
Start: 2023-07-19

## 2023-07-19 ASSESSMENT — ENCOUNTER SYMPTOMS
HEARTBURN: 1
CHEST TIGHTNESS: 1
SHORTNESS OF BREATH: 1
HEMOPTYSIS: 0
WHEEZING: 1
COUGH: 1

## 2023-07-19 NOTE — TELEPHONE ENCOUNTER
Contacted patient, name and D O B verified. Patient c/o having SOB, cough and wheezing for over a month and half. She has been seen at the urgent care and also had VV with PCP. Writer informed patient that there is no appointment within the time frame suggested by the triage nurse. Patient was advised that appointment has been made for her on 7/24/23 with PCP however if symptoms get worse she should seek immediate help from urgent care or ED. Patient verbalized understanding.

## 2023-07-19 NOTE — TELEPHONE ENCOUNTER
Location of patient: [address] [facility] call from [place] at [facility] with [email or web address]. Subjective: Caller states \"I've has some shortness of breath and a cough. I use my pulse O2 and my O2 sats are staying pretty good. 95% to 96%  I used Symbicort and now have nasal and throat congestion. I always feel like I can't get a deep breath. My chest feels heavy and tight all the time. Walking around a lot causes SOB. \"     Hx asthma    Current Symptoms: occasional SOB, cough,     Onset:  about a month and 1/2 or so    Pain Severity: no pain    Temperature:  no fever    What has been tried: UCC x 2 (prednisone and ABX), got a breathing TX and have seen Provider once    Recommended disposition: See PCP within 3 Days    Care advice provided, patient verbalizes understanding; denies any other questions or concerns; instructed to call back for any new or worsening symptoms. Pt understands to come see the Provider for long term treatment and care - such as asking if a nebulizer is appropriate. Pt also verbalizes understanding that if there is an urgent need for care, she should go to the [facility]. Dante Parada, Service Expert, is working to get an appt with Provider. Attention Provider: Thank you for allowing me to participate in the care of your patient. The patient was connected to triage in response to information provided to the ECC/PSC. Please do not respond through this encounter as the response is not directed to a shared pool.       Reason for Disposition   [1] Mild wheezing comes and goes AND [2] present > 3 days    Protocols used: Asthma Attack-ADULT-AH

## 2023-07-19 NOTE — PROGRESS NOTES
decreased air movement. Decreased breath sounds, wheezing and rhonchi present. No rales. Abdominal:      Tenderness: There is no abdominal tenderness. Lymphadenopathy:      Cervical: No cervical adenopathy. Neurological:      Mental Status: She is alert. 1. Chronic cough  2. Cough due to bronchospasm  3. Gastroesophageal reflux disease, unspecified whether esophagitis present  Comments:  suspect ? also PLRD  Orders:  -     pantoprazole (PROTONIX) 40 MG tablet; Take 1 tablet by mouth every morning (before breakfast), Disp-30 tablet, R-0Normal   May use Pepcid 20 mg twice x 10 days with Maalox suspension   Use Albuterol as needed    Follow with pulmonary and GI    No results found for any visits on 07/19/23. The patients condition was discussed with the patient and they understand. The patient is to follow up with primary care doctor. If signs and symptoms become worse the pt is to go to the ER. The patient is to take medications as prescribed.

## 2023-07-19 NOTE — TELEPHONE ENCOUNTER
Chung Martinez [facility] connected pt whom they say needs to be seen within 2 days. No appts. Available.  Head nurse will schedule pt with provider for Monday 24, 2023 Head nurse will call patient regarding scheduling appointment    Pt can be reached at [phone]

## 2023-07-24 ENCOUNTER — OFFICE VISIT (OUTPATIENT)
Age: 63
End: 2023-07-24
Payer: COMMERCIAL

## 2023-07-24 VITALS
BODY MASS INDEX: 33.68 KG/M2 | TEMPERATURE: 98.1 F | SYSTOLIC BLOOD PRESSURE: 124 MMHG | DIASTOLIC BLOOD PRESSURE: 74 MMHG | HEART RATE: 79 BPM | RESPIRATION RATE: 16 BRPM | OXYGEN SATURATION: 97 % | WEIGHT: 227.4 LBS | HEIGHT: 69 IN

## 2023-07-24 DIAGNOSIS — R13.19 ESOPHAGEAL DYSPHAGIA: ICD-10-CM

## 2023-07-24 DIAGNOSIS — K21.9 GASTROESOPHAGEAL REFLUX DISEASE, UNSPECIFIED WHETHER ESOPHAGITIS PRESENT: ICD-10-CM

## 2023-07-24 DIAGNOSIS — J45.40 MODERATE PERSISTENT ASTHMA WITHOUT COMPLICATION: Primary | ICD-10-CM

## 2023-07-24 PROCEDURE — 99214 OFFICE O/P EST MOD 30 MIN: CPT | Performed by: FAMILY MEDICINE

## 2023-07-24 SDOH — ECONOMIC STABILITY: FOOD INSECURITY: WITHIN THE PAST 12 MONTHS, YOU WORRIED THAT YOUR FOOD WOULD RUN OUT BEFORE YOU GOT MONEY TO BUY MORE.: NEVER TRUE

## 2023-07-24 SDOH — ECONOMIC STABILITY: FOOD INSECURITY: WITHIN THE PAST 12 MONTHS, THE FOOD YOU BOUGHT JUST DIDN'T LAST AND YOU DIDN'T HAVE MONEY TO GET MORE.: NEVER TRUE

## 2023-07-24 SDOH — ECONOMIC STABILITY: INCOME INSECURITY: HOW HARD IS IT FOR YOU TO PAY FOR THE VERY BASICS LIKE FOOD, HOUSING, MEDICAL CARE, AND HEATING?: NOT HARD AT ALL

## 2023-07-24 ASSESSMENT — ENCOUNTER SYMPTOMS
COUGH: 1
NAUSEA: 0
DIARRHEA: 0
CONSTIPATION: 0
VOMITING: 0

## 2023-07-24 ASSESSMENT — PATIENT HEALTH QUESTIONNAIRE - PHQ9
2. FEELING DOWN, DEPRESSED OR HOPELESS: 0
SUM OF ALL RESPONSES TO PHQ QUESTIONS 1-9: 0
SUM OF ALL RESPONSES TO PHQ9 QUESTIONS 1 & 2: 0
1. LITTLE INTEREST OR PLEASURE IN DOING THINGS: 0
SUM OF ALL RESPONSES TO PHQ QUESTIONS 1-9: 0

## 2023-07-24 NOTE — PROGRESS NOTES
Chief Complaint   Patient presents with    Asthma     Urgent 3 times since June    Cough     1. \"Have you been to the ER, urgent care clinic since your last visit? Hospitalized since your last visit? \" Yes Urgent Care on [address]    2. \"Have you seen or consulted any other health care providers outside of the [address] since your last visit? \" Naomi Le in May    3. For patients aged 43-73: Has the patient had a colonoscopy / FIT/ Cologuard? Has had within 10 years she thinks Dr Macario rOo      If the patient is female:    4. For patients aged 43-66: Has the patient had a mammogram within the past 2 years? Yes - no [facility] [place] present done @ [facility] 7/2022 and scheduled soon      5. For patients aged 21-65: Has the patient had a pap smear?  Yes - no [place] present Dr Edu Vidal yearly
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
## 2023-08-26 DIAGNOSIS — K21.9 GASTROESOPHAGEAL REFLUX DISEASE, UNSPECIFIED WHETHER ESOPHAGITIS PRESENT: ICD-10-CM

## 2023-08-26 RX ORDER — PANTOPRAZOLE SODIUM 40 MG/1
40 TABLET, DELAYED RELEASE ORAL
Qty: 90 TABLET | Refills: 0 | Status: SHIPPED | OUTPATIENT
Start: 2023-08-26

## 2023-08-29 ENCOUNTER — TELEMEDICINE (OUTPATIENT)
Age: 63
End: 2023-08-29
Payer: COMMERCIAL

## 2023-08-29 DIAGNOSIS — R05.3 CHRONIC COUGH: Primary | ICD-10-CM

## 2023-08-29 DIAGNOSIS — J45.40 MODERATE PERSISTENT ASTHMA WITHOUT COMPLICATION: ICD-10-CM

## 2023-08-29 DIAGNOSIS — K21.9 GASTROESOPHAGEAL REFLUX DISEASE, UNSPECIFIED WHETHER ESOPHAGITIS PRESENT: ICD-10-CM

## 2023-08-29 PROCEDURE — 99213 OFFICE O/P EST LOW 20 MIN: CPT | Performed by: FAMILY MEDICINE

## 2023-08-29 ASSESSMENT — ENCOUNTER SYMPTOMS
WHEEZING: 0
ANAL BLEEDING: 0
BLOOD IN STOOL: 0
ABDOMINAL PAIN: 0
VOMITING: 0
SHORTNESS OF BREATH: 0
DIARRHEA: 0
CONSTIPATION: 0
COUGH: 0
NAUSEA: 0

## 2023-08-29 ASSESSMENT — PATIENT HEALTH QUESTIONNAIRE - PHQ9
SUM OF ALL RESPONSES TO PHQ QUESTIONS 1-9: 0
2. FEELING DOWN, DEPRESSED OR HOPELESS: 0
SUM OF ALL RESPONSES TO PHQ QUESTIONS 1-9: 0
SUM OF ALL RESPONSES TO PHQ QUESTIONS 1-9: 0
SUM OF ALL RESPONSES TO PHQ9 QUESTIONS 1 & 2: 0
SUM OF ALL RESPONSES TO PHQ QUESTIONS 1-9: 0
1. LITTLE INTEREST OR PLEASURE IN DOING THINGS: 0
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
## 2023-09-21 NOTE — PROGRESS NOTES
patient was located in a state where the provider was credentialed to provide care. Pia Yu, was evaluated through a synchronous (real-time) audio-video encounter. The patient (or guardian if applicable) is aware that this is a billable service, which includes applicable co-pays. This Virtual Visit was conducted with patient's (and/or legal guardian's) consent. Patient identification was verified, and a caregiver was present when appropriate. The patient was located at Home: [address] [place] [address]  Provider was located at Home ([place] [address]): Marquita Grewal MD on 6/23/2023 at 10:33 AM    An electronic signature was used to authenticate this note. Total Time:  20 . An electronic signature was used to authenticate this note.   ~Roxanne Brown MD~ 10

## 2023-09-25 DIAGNOSIS — R73.03 PREDIABETES: ICD-10-CM

## 2023-09-25 RX ORDER — METFORMIN HYDROCHLORIDE 500 MG/1
TABLET, EXTENDED RELEASE ORAL
Qty: 180 TABLET | Refills: 0 | Status: SHIPPED | OUTPATIENT
Start: 2023-09-25

## 2023-10-23 NOTE — TELEPHONE ENCOUNTER
Last appointment: VV 8/29/23 MD [place]  Next appointment: 11/27/23 (CPE) MD [place]  Previous refill encounter(s): 1/28/23 90 + 2    Requested Prescriptions     Pending Prescriptions Disp Refills    buPROPion (WELLBUTRIN XL) 300 MG extended release tablet 90 tablet 0     Sig: Take 1 tablet by mouth daily     For Pharmacy Admin Tracking Only    Program: Medication Refill  CPA in place:    Recommendation Provided To:    Intervention Detail: New Rx: 1, reason: Patient Preference  Intervention Accepted By:   Garett Chamber Closed?:    Time Spent (min): 5

## 2023-10-24 RX ORDER — BUPROPION HYDROCHLORIDE 300 MG/1
300 TABLET ORAL DAILY
Qty: 90 TABLET | Refills: 0 | Status: SHIPPED | OUTPATIENT
Start: 2023-10-24

## 2023-11-22 DIAGNOSIS — K21.9 GASTROESOPHAGEAL REFLUX DISEASE, UNSPECIFIED WHETHER ESOPHAGITIS PRESENT: ICD-10-CM

## 2023-11-22 RX ORDER — PANTOPRAZOLE SODIUM 40 MG/1
40 TABLET, DELAYED RELEASE ORAL
Qty: 90 TABLET | Refills: 0 | Status: SHIPPED | OUTPATIENT
Start: 2023-11-22

## 2023-12-22 DIAGNOSIS — R73.03 PREDIABETES: ICD-10-CM

## 2023-12-22 RX ORDER — METFORMIN HYDROCHLORIDE 500 MG/1
TABLET, EXTENDED RELEASE ORAL
Qty: 60 TABLET | Refills: 0 | Status: SHIPPED | OUTPATIENT
Start: 2023-12-22

## 2023-12-26 NOTE — TELEPHONE ENCOUNTER
Left VM for pt to call office back. If pt calls back please let her know per Dr. Kilpatrick a 30 day supply of her metformin has been sent to pharmacy and that she is overdue for fasting follow up/labs with her as well last visit VV 8/29/23  and needs to scheduled OV asap.-TM 12/26/23

## 2024-04-09 ENCOUNTER — APPOINTMENT (OUTPATIENT)
Facility: HOSPITAL | Age: 64
End: 2024-04-09
Payer: COMMERCIAL

## 2024-04-09 ENCOUNTER — HOSPITAL ENCOUNTER (EMERGENCY)
Facility: HOSPITAL | Age: 64
Discharge: HOME | End: 2024-04-09
Attending: STUDENT IN AN ORGANIZED HEALTH CARE EDUCATION/TRAINING PROGRAM
Payer: COMMERCIAL

## 2024-04-09 VITALS
WEIGHT: 231.48 LBS | BODY MASS INDEX: 34.29 KG/M2 | HEIGHT: 69 IN | HEART RATE: 73 BPM | RESPIRATION RATE: 13 BRPM | SYSTOLIC BLOOD PRESSURE: 133 MMHG | OXYGEN SATURATION: 95 % | TEMPERATURE: 98.5 F | DIASTOLIC BLOOD PRESSURE: 82 MMHG

## 2024-04-09 DIAGNOSIS — R06.00 DYSPNEA, UNSPECIFIED TYPE: Primary | ICD-10-CM

## 2024-04-09 LAB
ALBUMIN SERPL-MCNC: 4 G/DL (ref 3.5–5)
ALBUMIN/GLOB SERPL: 1.3 (ref 1.1–2.2)
ALP SERPL-CCNC: 88 U/L (ref 45–117)
ALT SERPL-CCNC: 40 U/L (ref 12–78)
ANION GAP SERPL CALC-SCNC: 6 MMOL/L (ref 5–15)
AST SERPL-CCNC: 24 U/L (ref 15–37)
BASOPHILS # BLD: 0.1 K/UL (ref 0–0.1)
BASOPHILS NFR BLD: 1 % (ref 0–1)
BILIRUB SERPL-MCNC: 0.3 MG/DL (ref 0.2–1)
BUN SERPL-MCNC: 20 MG/DL (ref 6–20)
BUN/CREAT SERPL: 21 (ref 12–20)
CALCIUM SERPL-MCNC: 9.5 MG/DL (ref 8.5–10.1)
CHLORIDE SERPL-SCNC: 109 MMOL/L (ref 97–108)
CO2 SERPL-SCNC: 26 MMOL/L (ref 21–32)
COMMENT:: NORMAL
CREAT SERPL-MCNC: 0.94 MG/DL (ref 0.55–1.02)
DIFFERENTIAL METHOD BLD: NORMAL
EKG ATRIAL RATE: 82 BPM
EKG DIAGNOSIS: NORMAL
EKG P AXIS: 55 DEGREES
EKG P-R INTERVAL: 156 MS
EKG Q-T INTERVAL: 380 MS
EKG QRS DURATION: 74 MS
EKG QTC CALCULATION (BAZETT): 443 MS
EKG R AXIS: 51 DEGREES
EKG T AXIS: 46 DEGREES
EKG VENTRICULAR RATE: 82 BPM
EOSINOPHIL # BLD: 0.2 K/UL (ref 0–0.4)
EOSINOPHIL NFR BLD: 3 % (ref 0–7)
ERYTHROCYTE [DISTWIDTH] IN BLOOD BY AUTOMATED COUNT: 12.3 % (ref 11.5–14.5)
FLUAV RNA SPEC QL NAA+PROBE: NOT DETECTED
FLUBV RNA SPEC QL NAA+PROBE: NOT DETECTED
GLOBULIN SER CALC-MCNC: 3.2 G/DL (ref 2–4)
GLUCOSE SERPL-MCNC: 157 MG/DL (ref 65–100)
HCT VFR BLD AUTO: 42.3 % (ref 35–47)
HGB BLD-MCNC: 13.9 G/DL (ref 11.5–16)
IMM GRANULOCYTES # BLD AUTO: 0 K/UL (ref 0–0.04)
IMM GRANULOCYTES NFR BLD AUTO: 0 % (ref 0–0.5)
LYMPHOCYTES # BLD: 2.9 K/UL (ref 0.8–3.5)
LYMPHOCYTES NFR BLD: 36 % (ref 12–49)
MAGNESIUM SERPL-MCNC: 2 MG/DL (ref 1.6–2.4)
MCH RBC QN AUTO: 29.5 PG (ref 26–34)
MCHC RBC AUTO-ENTMCNC: 32.9 G/DL (ref 30–36.5)
MCV RBC AUTO: 89.8 FL (ref 80–99)
MONOCYTES # BLD: 0.5 K/UL (ref 0–1)
MONOCYTES NFR BLD: 6 % (ref 5–13)
NEUTS SEG # BLD: 4.2 K/UL (ref 1.8–8)
NEUTS SEG NFR BLD: 54 % (ref 32–75)
NRBC # BLD: 0 K/UL (ref 0–0.01)
NRBC BLD-RTO: 0 PER 100 WBC
NT PRO BNP: 20 PG/ML
PLATELET # BLD AUTO: 365 K/UL (ref 150–400)
PMV BLD AUTO: 10.2 FL (ref 8.9–12.9)
POTASSIUM SERPL-SCNC: 3.7 MMOL/L (ref 3.5–5.1)
PROT SERPL-MCNC: 7.2 G/DL (ref 6.4–8.2)
RBC # BLD AUTO: 4.71 M/UL (ref 3.8–5.2)
SARS-COV-2 RNA RESP QL NAA+PROBE: NOT DETECTED
SODIUM SERPL-SCNC: 141 MMOL/L (ref 136–145)
SPECIMEN HOLD: NORMAL
TROPONIN I SERPL HS-MCNC: 5 NG/L (ref 0–51)
TSH SERPL DL<=0.05 MIU/L-ACNC: 3.77 UIU/ML (ref 0.36–3.74)
WBC # BLD AUTO: 7.9 K/UL (ref 3.6–11)

## 2024-04-09 PROCEDURE — 71275 CT ANGIOGRAPHY CHEST: CPT

## 2024-04-09 PROCEDURE — 83880 ASSAY OF NATRIURETIC PEPTIDE: CPT

## 2024-04-09 PROCEDURE — 93010 ELECTROCARDIOGRAM REPORT: CPT | Performed by: INTERNAL MEDICINE

## 2024-04-09 PROCEDURE — 6360000004 HC RX CONTRAST MEDICATION: Performed by: STUDENT IN AN ORGANIZED HEALTH CARE EDUCATION/TRAINING PROGRAM

## 2024-04-09 PROCEDURE — 99285 EMERGENCY DEPT VISIT HI MDM: CPT

## 2024-04-09 PROCEDURE — 85025 COMPLETE CBC W/AUTO DIFF WBC: CPT

## 2024-04-09 PROCEDURE — 83735 ASSAY OF MAGNESIUM: CPT

## 2024-04-09 PROCEDURE — 87636 SARSCOV2 & INF A&B AMP PRB: CPT

## 2024-04-09 PROCEDURE — 36415 COLL VENOUS BLD VENIPUNCTURE: CPT

## 2024-04-09 PROCEDURE — 94640 AIRWAY INHALATION TREATMENT: CPT

## 2024-04-09 PROCEDURE — 84484 ASSAY OF TROPONIN QUANT: CPT

## 2024-04-09 PROCEDURE — 93005 ELECTROCARDIOGRAM TRACING: CPT | Performed by: STUDENT IN AN ORGANIZED HEALTH CARE EDUCATION/TRAINING PROGRAM

## 2024-04-09 PROCEDURE — 84443 ASSAY THYROID STIM HORMONE: CPT

## 2024-04-09 PROCEDURE — 6370000000 HC RX 637 (ALT 250 FOR IP): Performed by: STUDENT IN AN ORGANIZED HEALTH CARE EDUCATION/TRAINING PROGRAM

## 2024-04-09 PROCEDURE — 80053 COMPREHEN METABOLIC PANEL: CPT

## 2024-04-09 PROCEDURE — 71045 X-RAY EXAM CHEST 1 VIEW: CPT

## 2024-04-09 RX ORDER — IPRATROPIUM BROMIDE AND ALBUTEROL SULFATE 2.5; .5 MG/3ML; MG/3ML
1 SOLUTION RESPIRATORY (INHALATION)
Status: COMPLETED | OUTPATIENT
Start: 2024-04-09 | End: 2024-04-09

## 2024-04-09 RX ORDER — PREDNISONE 50 MG/1
50 TABLET ORAL DAILY
Qty: 5 TABLET | Refills: 0 | Status: SHIPPED | OUTPATIENT
Start: 2024-04-09 | End: 2024-04-14

## 2024-04-09 RX ADMIN — IOPAMIDOL 100 ML: 755 INJECTION, SOLUTION INTRAVENOUS at 03:37

## 2024-04-09 RX ADMIN — IPRATROPIUM BROMIDE AND ALBUTEROL SULFATE 1 DOSE: .5; 3 SOLUTION RESPIRATORY (INHALATION) at 03:00

## 2024-04-09 ASSESSMENT — PAIN SCALES - GENERAL
PAINLEVEL_OUTOF10: 4
PAINLEVEL_OUTOF10: 0

## 2024-04-09 ASSESSMENT — PAIN DESCRIPTION - ONSET: ONSET: SUDDEN

## 2024-04-09 ASSESSMENT — PAIN DESCRIPTION - LOCATION: LOCATION: CHEST

## 2024-04-09 ASSESSMENT — LIFESTYLE VARIABLES
HOW OFTEN DO YOU HAVE A DRINK CONTAINING ALCOHOL: MONTHLY OR LESS
HOW MANY STANDARD DRINKS CONTAINING ALCOHOL DO YOU HAVE ON A TYPICAL DAY: 1 OR 2

## 2024-04-09 ASSESSMENT — PAIN DESCRIPTION - ORIENTATION: ORIENTATION: MID

## 2024-04-09 ASSESSMENT — PAIN DESCRIPTION - DESCRIPTORS: DESCRIPTORS: ACHING;SORE

## 2024-04-09 ASSESSMENT — PAIN DESCRIPTION - PAIN TYPE: TYPE: ACUTE PAIN

## 2024-04-09 ASSESSMENT — PAIN DESCRIPTION - FREQUENCY: FREQUENCY: CONTINUOUS

## 2024-04-09 ASSESSMENT — PAIN - FUNCTIONAL ASSESSMENT
PAIN_FUNCTIONAL_ASSESSMENT: ACTIVITIES ARE NOT PREVENTED
PAIN_FUNCTIONAL_ASSESSMENT: 0-10

## 2024-04-09 NOTE — DISCHARGE INSTRUCTIONS
Please follow-up with pulmonology regarding your dyspnea.  Starting on a course of steroids.  You could possibly have a asthma exacerbation without a diagnosis like you to follow-up with pulmonology.  Return as needed or if worsening condition

## 2024-04-09 NOTE — ED PROVIDER NOTES
[facility] EMERGENCY DEP  EMERGENCY DEPARTMENT ENCOUNTER      Pt Name: Candis Martinez  MRN: [id number]  Birthdate 1960  Date of evaluation: 4/9/2024  Provider: Marc Peoples DO    CHIEF COMPLAINT       Chief Complaint   Patient presents with   • Shortness of Breath         HISTORY OF PRESENT ILLNESS   (Location/Symptom, Timing/Onset, Context/Setting, Quality, Duration, Modifying Factors, Severity)  Note limiting factors.   HPI      Review of External Medical Records:     Nursing Notes were reviewed.    REVIEW OF SYSTEMS    (2-9 systems for level 4, 10 or more for level 5)     Review of Systems    Except as noted above the remainder of the review of systems was reviewed and negative.       PAST MEDICAL HISTORY     Past Medical History:   Diagnosis Date   • Asthmatic bronchitis 12/06/2012   • Avulsion fracture of ankle 07/10/2015   • Baker's cyst 12/09/2013    Left knee; 12/6/13, Dr Olguin   • Cervical spinal stenosis 07/14/2016    2016 Dr Byrne, PT   • Chronic depression 12/06/2012   • Closed fracture of left ankle 08/26/2019    6-1-19, boot, no surgery   • DDD (degenerative disc disease), cervical 07/14/2016   • History of endometrial cancer 04/01/2020    Grade 1 Endometrioid Carcinoma, S/P Total Lap Hysterectomy-BSO 9-2015; Gyn Dr Busch, Gyn Onc, Dr Chai Pinto     • Mild intermittent asthma without complication 05/09/2019    2-3 x a year triggered by peak allergy time or URI's   • Mixed hyperlipidemia 8/26/2019   • Moderate persistent asthma without complication 03/21/2020    3-2020   • OA (osteoarthritis) of knee 12/06/2012   • Perennial allergic rhinitis with seasonal variation 05/09/2019    [place] spring and fall   • Pneumonia 12/06/2012   • Postmenopause 12/06/2012   • Prediabetes 09/12/2020   • Screening exam for skin cancer 03/30/2016    Dermatologist routine checkes   • Vitamin D deficiency 12/23/2012         SURGICAL HISTORY       Past Surgical History:   Procedure Laterality Date   •

## 2024-04-09 NOTE — ED TRIAGE NOTES
Pt came by EMS from home. CC was increase Sob and tightness of the chest. Pt states to have difficulty breathing at 0030 and took her albuterol inhaler. Pt states her inhaler did not help with her SOB. Pt is also experiencing dizziness and tingling of her lower extremities. Pt denies n/v/d. A & O x4.     PMH: uterine cancer and hysterectomy

## 2024-04-18 ASSESSMENT — ENCOUNTER SYMPTOMS
ABDOMINAL PAIN: 0
SHORTNESS OF BREATH: 1

## 2024-06-06 ENCOUNTER — TELEPHONE (OUTPATIENT)
Age: 64
End: 2024-06-06

## 2024-06-06 NOTE — TELEPHONE ENCOUNTER
Sent pt a My Chart message informing her that 's next Physical is not until mid December,and that she can now make her own appt thru the portal.

## 2024-06-06 NOTE — TELEPHONE ENCOUNTER
----- Message from Anaid Man sent at 6/6/2024 11:56 AM EDT -----  Subject: Appointment Request    Reason for Call: Established Patient Appointment needed: Routine Physical   Exam    QUESTIONS    Reason for appointment request? No appointments available during search     Additional Information for Provider? Patient will be gone entire month of   July and will be back 812 but they are wanting to reschedule physical for   sometime when returning or before hand. Please call back to schedule   and/or let patient know if they can be put on a waiting list.  ---------------------------------------------------------------------------  --------------  CALL BACK INFO  [phone]; OK to leave message on voicemail,OK to respond with electronic   message via [facility] portal (only for patients who have registered [facility]   account)  ---------------------------------------------------------------------------  --------------  SCRIPT ANSWERS

## 2024-06-22 RX ORDER — BUPROPION HYDROCHLORIDE 300 MG/1
300 TABLET ORAL DAILY
Qty: 90 TABLET | Refills: 0 | Status: SHIPPED | OUTPATIENT
Start: 2024-06-22

## 2024-06-22 NOTE — TELEPHONE ENCOUNTER
I last saw patient for a virtual visit for sick visit 8/2023. Her last physical was . She was scheduled for physical  but cancelled due to Covid. She was then scheduled for physical 7-2024 but she cancelled that and not sure why. She is now not rescheduled for a physical until 12-23-24. But I know I have sooner appointments for sometime this summer for her to have a 40 minute fasting appointment. Please call to get rebooked for fasting CPE this summer in July or August to get her back on track.

## 2024-06-23 RX ORDER — BUPROPION HYDROCHLORIDE 300 MG/1
300 TABLET ORAL DAILY
Qty: 90 TABLET | Refills: 0 | Status: SHIPPED | OUTPATIENT
Start: 2024-06-23

## 2024-10-01 ENCOUNTER — TELEPHONE (OUTPATIENT)
Age: 64
End: 2024-10-01

## 2024-10-01 NOTE — TELEPHONE ENCOUNTER
----- Message from Marysol PATEL sent at 9/30/2024  2:56 PM EDT -----  Regarding: FW: ECC Appointment Request    ----- Message -----  From: Jose Salmon  Sent: 9/30/2024  10:13 AM EDT  To: Moses Ribeiro Clinical Staff  Subject: ECC Appointment Request                          ECC Appointment Request    Patient needs appointment for ECC Appointment Type: Annual Visit.    Patient Requested Dates(s):Soonest available  Patient Requested Time: Morning time  Provider Name: Roxanne Kilpatrick MD    Reason for Appointment Request: Established Patient - Available appointments did not meet patient need.  The patient receive  a call last week from the office about her appointment on December that was canceled from the office and she need to reschedule it about her yearly physical, per checking the next availability is around April and dit not suit the patient need, she needs to be done her physical this year or soonest available.  --------------------------------------------------------------------------------------------------------------------------    Relationship to Patient: Self     Call Back Information: OK to leave message on voicemail  Preferred Call Back Number: Phone: [phone]

## 2024-10-01 NOTE — TELEPHONE ENCOUNTER
Can pt be fit into 's schedule for her Annual ? Pt was scheduled to see  on 12/23/24,however we had to cancel that appt since she will not be in Office that day.Pt need's to have it before the end of the year.

## 2024-10-02 NOTE — TELEPHONE ENCOUNTER
Call and spoke to patient, two ID confirmed.  Writer schedule Physical due to she is on  Insurance. 11/12/24      Also patient schedule a Lab appointment on 11/4/24 and she is requesting her labs to be drawn the week before. She knows to fast.

## 2024-10-04 NOTE — TELEPHONE ENCOUNTER
She turned 63 yo since we last saw her in the office. I have not seen her in over a year 7/2023 in office, 8/2023 virtual, and she has not had a physical w/ me since . Because of her insurance change, she should wait until her appointment w/ me before I order labs. We need to update her insurance so I know where her labs have to be done and I need to know how to code the labs for billing etc since she has not been in and is on a new plan now. Please advise. Thanks.

## 2024-11-12 ENCOUNTER — OFFICE VISIT (OUTPATIENT)
Age: 64
End: 2024-11-12
Payer: COMMERCIAL

## 2024-11-12 VITALS
DIASTOLIC BLOOD PRESSURE: 88 MMHG | RESPIRATION RATE: 16 BRPM | TEMPERATURE: 98.7 F | OXYGEN SATURATION: 97 % | SYSTOLIC BLOOD PRESSURE: 136 MMHG | HEIGHT: 68 IN | BODY MASS INDEX: 33.92 KG/M2 | WEIGHT: 223.8 LBS | HEART RATE: 81 BPM

## 2024-11-12 DIAGNOSIS — E55.9 VITAMIN D DEFICIENCY: ICD-10-CM

## 2024-11-12 DIAGNOSIS — Z00.00 ANNUAL PHYSICAL EXAM: Primary | ICD-10-CM

## 2024-11-12 DIAGNOSIS — Z23 ENCOUNTER FOR IMMUNIZATION: ICD-10-CM

## 2024-11-12 DIAGNOSIS — R73.03 PREDIABETES: ICD-10-CM

## 2024-11-12 DIAGNOSIS — E78.2 MIXED HYPERLIPIDEMIA: ICD-10-CM

## 2024-11-12 LAB
25(OH)D3 SERPL-MCNC: 35 NG/ML (ref 30–100)
ALBUMIN SERPL-MCNC: 4.2 G/DL (ref 3.5–5)
ALBUMIN/GLOB SERPL: 1.4 (ref 1.1–2.2)
ALP SERPL-CCNC: 92 U/L (ref 45–117)
ALT SERPL-CCNC: 34 U/L (ref 12–78)
ANION GAP SERPL CALC-SCNC: 5 MMOL/L (ref 2–12)
AST SERPL-CCNC: 19 U/L (ref 15–37)
BILIRUB SERPL-MCNC: 0.5 MG/DL (ref 0.2–1)
BUN SERPL-MCNC: 14 MG/DL (ref 6–20)
BUN/CREAT SERPL: 16 (ref 12–20)
CALCIUM SERPL-MCNC: 9.3 MG/DL (ref 8.5–10.1)
CHLORIDE SERPL-SCNC: 107 MMOL/L (ref 97–108)
CHOLEST SERPL-MCNC: 233 MG/DL
CO2 SERPL-SCNC: 28 MMOL/L (ref 21–32)
CREAT SERPL-MCNC: 0.89 MG/DL (ref 0.55–1.02)
ERYTHROCYTE [DISTWIDTH] IN BLOOD BY AUTOMATED COUNT: 12.7 % (ref 11.5–14.5)
EST. AVERAGE GLUCOSE BLD GHB EST-MCNC: 126 MG/DL
GLOBULIN SER CALC-MCNC: 3 G/DL (ref 2–4)
GLUCOSE SERPL-MCNC: 99 MG/DL (ref 65–100)
HBA1C MFR BLD: 6 % (ref 4–5.6)
HCT VFR BLD AUTO: 40.4 % (ref 35–47)
HDLC SERPL-MCNC: 51 MG/DL
HDLC SERPL: 4.6 (ref 0–5)
HGB BLD-MCNC: 13.2 G/DL (ref 11.5–16)
LDLC SERPL CALC-MCNC: 148.6 MG/DL (ref 0–100)
MCH RBC QN AUTO: 29.7 PG (ref 26–34)
MCHC RBC AUTO-ENTMCNC: 32.7 G/DL (ref 30–36.5)
MCV RBC AUTO: 91 FL (ref 80–99)
NRBC # BLD: 0 K/UL (ref 0–0.01)
NRBC BLD-RTO: 0 PER 100 WBC
PLATELET # BLD AUTO: 359 K/UL (ref 150–400)
PMV BLD AUTO: 10.5 FL (ref 8.9–12.9)
POTASSIUM SERPL-SCNC: 4.4 MMOL/L (ref 3.5–5.1)
PROT SERPL-MCNC: 7.2 G/DL (ref 6.4–8.2)
RBC # BLD AUTO: 4.44 M/UL (ref 3.8–5.2)
SODIUM SERPL-SCNC: 140 MMOL/L (ref 136–145)
TRIGL SERPL-MCNC: 167 MG/DL
TSH SERPL DL<=0.05 MIU/L-ACNC: 3 UIU/ML (ref 0.36–3.74)
VLDLC SERPL CALC-MCNC: 33.4 MG/DL
WBC # BLD AUTO: 6.2 K/UL (ref 3.6–11)

## 2024-11-12 PROCEDURE — 90472 IMMUNIZATION ADMIN EACH ADD: CPT | Performed by: FAMILY MEDICINE

## 2024-11-12 PROCEDURE — 99397 PER PM REEVAL EST PAT 65+ YR: CPT | Performed by: FAMILY MEDICINE

## 2024-11-12 PROCEDURE — 90732 PPSV23 VACC 2 YRS+ SUBQ/IM: CPT | Performed by: FAMILY MEDICINE

## 2024-11-12 PROCEDURE — 90653 IIV ADJUVANT VACCINE IM: CPT | Performed by: FAMILY MEDICINE

## 2024-11-12 PROCEDURE — 90471 IMMUNIZATION ADMIN: CPT | Performed by: FAMILY MEDICINE

## 2024-11-12 RX ORDER — ALBUTEROL SULFATE 90 UG/1
2 INHALANT RESPIRATORY (INHALATION) EVERY 6 HOURS PRN
COMMUNITY
Start: 2024-11-12

## 2024-11-12 SDOH — ECONOMIC STABILITY: FOOD INSECURITY: WITHIN THE PAST 12 MONTHS, THE FOOD YOU BOUGHT JUST DIDN'T LAST AND YOU DIDN'T HAVE MONEY TO GET MORE.: NEVER TRUE

## 2024-11-12 SDOH — ECONOMIC STABILITY: FOOD INSECURITY: WITHIN THE PAST 12 MONTHS, YOU WORRIED THAT YOUR FOOD WOULD RUN OUT BEFORE YOU GOT MONEY TO BUY MORE.: NEVER TRUE

## 2024-11-12 SDOH — ECONOMIC STABILITY: INCOME INSECURITY: HOW HARD IS IT FOR YOU TO PAY FOR THE VERY BASICS LIKE FOOD, HOUSING, MEDICAL CARE, AND HEATING?: NOT HARD AT ALL

## 2024-11-12 ASSESSMENT — PATIENT HEALTH QUESTIONNAIRE - PHQ9
10. IF YOU CHECKED OFF ANY PROBLEMS, HOW DIFFICULT HAVE THESE PROBLEMS MADE IT FOR YOU TO DO YOUR WORK, TAKE CARE OF THINGS AT HOME, OR GET ALONG WITH OTHER PEOPLE: NOT DIFFICULT AT ALL
SUM OF ALL RESPONSES TO PHQ QUESTIONS 1-9: 0
SUM OF ALL RESPONSES TO PHQ QUESTIONS 1-9: 0
8. MOVING OR SPEAKING SO SLOWLY THAT OTHER PEOPLE COULD HAVE NOTICED. OR THE OPPOSITE, BEING SO FIGETY OR RESTLESS THAT YOU HAVE BEEN MOVING AROUND A LOT MORE THAN USUAL: NOT AT ALL
1. LITTLE INTEREST OR PLEASURE IN DOING THINGS: NOT AT ALL
4. FEELING TIRED OR HAVING LITTLE ENERGY: NOT AT ALL
6. FEELING BAD ABOUT YOURSELF - OR THAT YOU ARE A FAILURE OR HAVE LET YOURSELF OR YOUR FAMILY DOWN: NOT AT ALL
9. THOUGHTS THAT YOU WOULD BE BETTER OFF DEAD, OR OF HURTING YOURSELF: NOT AT ALL
SUM OF ALL RESPONSES TO PHQ QUESTIONS 1-9: 0
2. FEELING DOWN, DEPRESSED OR HOPELESS: NOT AT ALL
SUM OF ALL RESPONSES TO PHQ QUESTIONS 1-9: 0
SUM OF ALL RESPONSES TO PHQ9 QUESTIONS 1 & 2: 0
5. POOR APPETITE OR OVEREATING: NOT AT ALL
3. TROUBLE FALLING OR STAYING ASLEEP: NOT AT ALL
7. TROUBLE CONCENTRATING ON THINGS, SUCH AS READING THE NEWSPAPER OR WATCHING TELEVISION: NOT AT ALL

## 2024-11-12 ASSESSMENT — ENCOUNTER SYMPTOMS
VOMITING: 0
EYES NEGATIVE: 1
RESPIRATORY NEGATIVE: 1
DIARRHEA: 0
ABDOMINAL PAIN: 0
CONSTIPATION: 0
BLOOD IN STOOL: 0
NAUSEA: 0

## 2024-11-12 NOTE — PROGRESS NOTES
Chief Complaint   Patient presents with    Cholesterol Problem     fasting    pre DM    Annual Exam       1. \"Have you been to the ER, urgent care clinic since your last visit?  Hospitalized since your last visit?\"  [facility] March/April for SOB    2. \"Have you seen or consulted any other health care providers outside of the [facility] since your last visit?\" GI Dr Almeida and gyn    3. For patients aged 45-75: Has the patient had a colonoscopy / FIT/ Cologuard? Yes - no Care Gap present 4/2024 2 polyps removed, mild diverticulosis, repeat 10 years per Dr Almeida @ [facility]       If the patient is female:    4. For patients aged 40-74: Has the patient had a mammogram within the past 2 years? Yes - no Care Gap present done @ [facility] recently      5. For patients aged 21-65: Has the patient had a pap smear? Yes - no Care Gap present gyn Dr Panfilo Pinto Couple weeks ago       
2022    Pneumococcal, PPSV23, PNEUMOVAX 23, (age 2y+), SC/IM, 0.5mL 2024    TDaP, ADACEL (age 10y-64y), BOOSTRIX (age 10y+), IM, 0.5mL 2006, 2020    Td vaccine (adult) 2016         FAMILY HISTORY     Family History   Problem Relation Age of Onset    Osteoarthritis Mother         B knee replacements in her 60's    High Cholesterol Mother     Kidney Disease Mother         kidney transplant age 74yo ;  renal failure age 83    Diabetes Mother 40    Diabetes Father     Dementia Father         Alzheimer's;  after fall and hip fx    Heart Surgery Father         CABG in his 60's    Arthritis Father         hand arthritis and Dupytrens Contractures    Heart Disease Father     Hypertension Brother     Hypertension Brother     Diabetes Brother         mild, controlling w/ diet    Emphysema Brother         heavy smoker,  ~ 61 yo    Diabetes Maternal Grandfather     Heart Attack Maternal Grandfather 58         MI     Allergic Rhinitis Daughter     No Known Problems Daughter     No Known Problems Son     Breast Cancer Neg Hx     Colon Cancer Neg Hx          VITALS   /88 (Site: Left Upper Arm, Position: Sitting, Cuff Size: Large Adult)   Pulse 81   Temp 98.7 °F (37.1 °C) (Oral)   Resp 16   Ht 1.715 m (5' 7.5\")   Wt 101.5 kg (223 lb 12.8 oz)   SpO2 97%   BMI 34.53 kg/m²     Vitals:    24 0810   BP: 136/88   Site: Left Upper Arm   Position: Sitting   Cuff Size: Large Adult   Pulse: 81   Resp: 16   Temp: 98.7 °F (37.1 °C)   TempSrc: Oral   SpO2: 97%   Weight: 101.5 kg (223 lb 12.8 oz)   Height: 1.715 m (5' 7.5\")          PHYSICAL EXAMINATION   Physical Exam  Vitals reviewed.   Constitutional:       General: She is not in acute distress.  HENT:      Right Ear: Tympanic membrane normal.      Left Ear: Tympanic membrane normal.      Mouth/Throat:      Mouth: Mucous membranes are moist.      Pharynx: Oropharynx is clear.   Eyes:      Extraocular Movements: Extraocular

## 2024-12-12 NOTE — TELEPHONE ENCOUNTER
Last appointment: 11/12/24 MD [place]  Next appointment: 11/13/25 MD [place]  Previous refill encounter(s): 6/22/24 90    Requested Prescriptions     Pending Prescriptions Disp Refills    buPROPion (WELLBUTRIN XL) 300 MG extended release tablet [Pharmacy Med Name: BUPROPION HCL  MG TABLET] 90 tablet 3     Sig: Take 1 tablet by mouth daily     For Pharmacy Admin Tracking Only    Program: Medication Refill  CPA in place:    Recommendation Provided To:   Intervention Detail: New Rx: 1, reason: Patient Preference  Intervention Accepted By:   Gap Closed?:    Time Spent (min): 5

## 2024-12-13 RX ORDER — BUPROPION HYDROCHLORIDE 300 MG/1
300 TABLET ORAL DAILY
Qty: 90 TABLET | Refills: 3 | Status: SHIPPED | OUTPATIENT
Start: 2024-12-13

## 2024-12-18 ENCOUNTER — TELEPHONE (OUTPATIENT)
Age: 64
End: 2024-12-18

## 2024-12-19 ENCOUNTER — TELEMEDICINE (OUTPATIENT)
Age: 64
End: 2024-12-19

## 2024-12-19 DIAGNOSIS — E78.2 MIXED HYPERLIPIDEMIA: ICD-10-CM

## 2024-12-19 DIAGNOSIS — R73.03 PREDIABETES: ICD-10-CM

## 2024-12-19 DIAGNOSIS — Z76.89 ENCOUNTER FOR WEIGHT MANAGEMENT: Primary | ICD-10-CM

## 2024-12-19 ASSESSMENT — ENCOUNTER SYMPTOMS
RESPIRATORY NEGATIVE: 1
GASTROINTESTINAL NEGATIVE: 1

## 2024-12-19 NOTE — PROGRESS NOTES
VIRTUAL VISIT    Patient seen via virtual visit today for the following:  Chief Complaint   Patient presents with    Weight Management       HISTORY OF PRESENT ILLNESS   HPI  Patient with history of Obesity, Mixed Hyperlipidemia, Prediabetes presents to discuss weight management and medications. She was intolerant of Metformin trial in the past. She has also been intolerant of statins. She was disappointed by her recent lab results at her CPE and her ongoing struggles w/ weight despite her efforts w/ diet and exercise. She had researched about GLP1 agonists and was inquiring about Zepbound. She goes onto Medicare in 2-2025 and it is doubtful this medication will be covered. She wants to explore it and other options at this time.   Diet: low carb diet and overall making healthier food choices the past year; breakfast: eggs, egg whites, smoothie or yogurt; lunch: salad, egg salad, grilled chicken; dinner: chicken and veggies; not much red meat  Caffeine: 1 cup of coffee qam, rare soda, no tea  Etoh: very occasional  Exercise: since 8/1/23 exercises every day either swimming or walking and feels great; swims 3-4 x a week at [facility] x 30 minutes (40 laps), gets 10k steps in every day  Weight: stable in the 220's-230's over the years and frustrated she can't lose weight despite being regular w/ exercise and healthy eating    REVIEW OF SYMPTOMS   Review of Systems   Constitutional: Negative.    Respiratory: Negative.     Cardiovascular: Negative.    Gastrointestinal: Negative.    Neurological: Negative.              PROBLEM LIST/MEDICAL HISTORY     Patient Active Problem List    Diagnosis Date Noted    Complete tear of left rotator cuff 05/30/2023     Overview Note:     3-2023: PT; CSI 5-2023, Dr. Molina [facility]      Subluxation of tendon of long head of biceps 05/30/2023    Prediabetes 09/12/2020    History of endometrial cancer 04/01/2020     Overview Note:     Grade 1 Endometrioid Carcinoma, S/P Total Lap Hysterectomy-BSO

## 2025-01-02 ENCOUNTER — TELEPHONE (OUTPATIENT)
Age: 65
End: 2025-01-02

## 2025-01-24 ENCOUNTER — TELEPHONE (OUTPATIENT)
Age: 65
End: 2025-01-24

## 2025-01-24 NOTE — TELEPHONE ENCOUNTER
Called patient regarding referral to our formerly [facility]. Patient did not answer so I left a vmail with our contact information.

## 2025-02-05 ENCOUNTER — OFFICE VISIT (OUTPATIENT)
Age: 65
End: 2025-02-05
Payer: MEDICARE

## 2025-02-05 VITALS
SYSTOLIC BLOOD PRESSURE: 135 MMHG | OXYGEN SATURATION: 97 % | HEART RATE: 79 BPM | WEIGHT: 230.6 LBS | HEIGHT: 68 IN | BODY MASS INDEX: 34.95 KG/M2 | RESPIRATION RATE: 18 BRPM | TEMPERATURE: 96.9 F | DIASTOLIC BLOOD PRESSURE: 80 MMHG

## 2025-02-05 DIAGNOSIS — G89.29 ACUTE EXACERBATION OF CHRONIC LOW BACK PAIN: Primary | ICD-10-CM

## 2025-02-05 DIAGNOSIS — G89.29 CHRONIC LEFT-SIDED LOW BACK PAIN WITHOUT SCIATICA: ICD-10-CM

## 2025-02-05 DIAGNOSIS — M54.50 CHRONIC LEFT-SIDED LOW BACK PAIN WITHOUT SCIATICA: ICD-10-CM

## 2025-02-05 DIAGNOSIS — M54.50 ACUTE EXACERBATION OF CHRONIC LOW BACK PAIN: Primary | ICD-10-CM

## 2025-02-05 PROBLEM — K21.00 HIATAL HERNIA WITH GERD AND ESOPHAGITIS: Status: ACTIVE | Noted: 2025-02-05

## 2025-02-05 PROBLEM — K20.90: Status: ACTIVE | Noted: 2025-02-05

## 2025-02-05 PROBLEM — K44.9 HIATAL HERNIA WITH GERD AND ESOPHAGITIS: Status: ACTIVE | Noted: 2025-02-05

## 2025-02-05 PROBLEM — K22.70: Status: ACTIVE | Noted: 2025-02-05

## 2025-02-05 PROCEDURE — 1123F ACP DISCUSS/DSCN MKR DOCD: CPT | Performed by: FAMILY MEDICINE

## 2025-02-05 PROCEDURE — 99214 OFFICE O/P EST MOD 30 MIN: CPT | Performed by: FAMILY MEDICINE

## 2025-02-05 PROCEDURE — G8427 DOCREV CUR MEDS BY ELIG CLIN: HCPCS | Performed by: FAMILY MEDICINE

## 2025-02-05 PROCEDURE — G8417 CALC BMI ABV UP PARAM F/U: HCPCS | Performed by: FAMILY MEDICINE

## 2025-02-05 PROCEDURE — 1036F TOBACCO NON-USER: CPT | Performed by: FAMILY MEDICINE

## 2025-02-05 PROCEDURE — G8400 PT W/DXA NO RESULTS DOC: HCPCS | Performed by: FAMILY MEDICINE

## 2025-02-05 PROCEDURE — 3017F COLORECTAL CA SCREEN DOC REV: CPT | Performed by: FAMILY MEDICINE

## 2025-02-05 PROCEDURE — 1090F PRES/ABSN URINE INCON ASSESS: CPT | Performed by: FAMILY MEDICINE

## 2025-02-05 RX ORDER — CYCLOBENZAPRINE HCL 10 MG
5-10 TABLET ORAL NIGHTLY PRN
Qty: 30 TABLET | Refills: 0 | Status: SHIPPED | OUTPATIENT
Start: 2025-02-05

## 2025-02-05 RX ORDER — METHYLPREDNISOLONE 4 MG/1
TABLET ORAL
Qty: 1 KIT | Refills: 0 | Status: SHIPPED | OUTPATIENT
Start: 2025-02-05 | End: 2025-02-11

## 2025-02-05 SDOH — ECONOMIC STABILITY: TRANSPORTATION INSECURITY
IN THE PAST 12 MONTHS, HAS LACK OF TRANSPORTATION KEPT YOU FROM MEETINGS, WORK, OR FROM GETTING THINGS NEEDED FOR DAILY LIVING?: NO

## 2025-02-05 SDOH — ECONOMIC STABILITY: FOOD INSECURITY: WITHIN THE PAST 12 MONTHS, YOU WORRIED THAT YOUR FOOD WOULD RUN OUT BEFORE YOU GOT MONEY TO BUY MORE.: NEVER TRUE

## 2025-02-05 SDOH — ECONOMIC STABILITY: TRANSPORTATION INSECURITY
IN THE PAST 12 MONTHS, HAS THE LACK OF TRANSPORTATION KEPT YOU FROM MEDICAL APPOINTMENTS OR FROM GETTING MEDICATIONS?: NO

## 2025-02-05 SDOH — ECONOMIC STABILITY: FOOD INSECURITY: WITHIN THE PAST 12 MONTHS, THE FOOD YOU BOUGHT JUST DIDN'T LAST AND YOU DIDN'T HAVE MONEY TO GET MORE.: NEVER TRUE

## 2025-02-05 SDOH — ECONOMIC STABILITY: INCOME INSECURITY: IN THE LAST 12 MONTHS, WAS THERE A TIME WHEN YOU WERE NOT ABLE TO PAY THE MORTGAGE OR RENT ON TIME?: NO

## 2025-02-05 ASSESSMENT — PATIENT HEALTH QUESTIONNAIRE - PHQ9
8. MOVING OR SPEAKING SO SLOWLY THAT OTHER PEOPLE COULD HAVE NOTICED. OR THE OPPOSITE, BEING SO FIGETY OR RESTLESS THAT YOU HAVE BEEN MOVING AROUND A LOT MORE THAN USUAL: NOT AT ALL
SUM OF ALL RESPONSES TO PHQ QUESTIONS 1-9: 0
2. FEELING DOWN, DEPRESSED OR HOPELESS: NOT AT ALL
SUM OF ALL RESPONSES TO PHQ QUESTIONS 1-9: 0
7. TROUBLE CONCENTRATING ON THINGS, SUCH AS READING THE NEWSPAPER OR WATCHING TELEVISION: NOT AT ALL
SUM OF ALL RESPONSES TO PHQ QUESTIONS 1-9: 0
10. IF YOU CHECKED OFF ANY PROBLEMS, HOW DIFFICULT HAVE THESE PROBLEMS MADE IT FOR YOU TO DO YOUR WORK, TAKE CARE OF THINGS AT HOME, OR GET ALONG WITH OTHER PEOPLE: NOT DIFFICULT AT ALL
1. LITTLE INTEREST OR PLEASURE IN DOING THINGS: NOT AT ALL
SUM OF ALL RESPONSES TO PHQ9 QUESTIONS 1 & 2: 0
4. FEELING TIRED OR HAVING LITTLE ENERGY: NOT AT ALL
SUM OF ALL RESPONSES TO PHQ QUESTIONS 1-9: 0
6. FEELING BAD ABOUT YOURSELF - OR THAT YOU ARE A FAILURE OR HAVE LET YOURSELF OR YOUR FAMILY DOWN: NOT AT ALL
9. THOUGHTS THAT YOU WOULD BE BETTER OFF DEAD, OR OF HURTING YOURSELF: NOT AT ALL
5. POOR APPETITE OR OVEREATING: NOT AT ALL
3. TROUBLE FALLING OR STAYING ASLEEP: NOT AT ALL

## 2025-02-05 ASSESSMENT — ENCOUNTER SYMPTOMS
RESPIRATORY NEGATIVE: 1
GASTROINTESTINAL NEGATIVE: 1

## 2025-02-05 NOTE — PROGRESS NOTES
Chief Complaint   Patient presents with    Lower Back Pain     \"Have you been to the ER, urgent care clinic since your last visit?  Hospitalized since your last visit?\"    NO    “Have you seen or consulted any other health care providers outside of [facility] since your last visit?”    NO                   2/5/2025     1:16 PM   PHQ-9    Little interest or pleasure in doing things 0   Feeling down, depressed, or hopeless 0   Trouble falling or staying asleep, or sleeping too much 0   Feeling tired or having little energy 0   Poor appetite or overeating 0   Feeling bad about yourself - or that you are a failure or have let yourself or your family down 0   Trouble concentrating on things, such as reading the newspaper or watching television 0   Moving or speaking so slowly that other people could have noticed. Or the opposite - being so fidgety or restless that you have been moving around a lot more than usual 0   Thoughts that you would be better off dead, or of hurting yourself in some way 0   PHQ-2 Score 0   PHQ-9 Total Score 0   If you checked off any problems, how difficult have these problems made it for you to do your work, take care of things at home, or get along with other people? 0           Financial Resource Strain: Low Risk  (11/12/2024)    Overall Financial Resource Strain (CARDIA)     Difficulty of Paying Living Expenses: Not hard at all      Food Insecurity: No Food Insecurity (2/5/2025)    Hunger Vital Sign     Worried About Running Out of Food in the Last Year: Never true     Ran Out of Food in the Last Year: Never true          Health Maintenance Due   Topic Date Due    HIV screen  Never done    Shingles vaccine (1 of 2) Never done    DEXA (modify frequency per FRAX score)  Never done    Annual Wellness Visit (Medicare)  Never done        
Note:     Left knee; 12/6/13, Dr Olguin       • Vitamin D deficiency 12/23/2012   • Asthmatic bronchitis 12/06/2012     Overview Note:     Since ~ 2006       • Chronic depression 12/06/2012     Overview Note:     Treated sporadically over the years w/ Effexor or Celexa (better) then   Wellbutrin      Formatting of this note might be different from the original.  Treated sporadically over the years w/ Effexor or Celexa (better) then Wellbutrin     • OA (osteoarthritis) of knee 12/06/2012     Overview Note:     Xrays 2011; Ortho Dr Olguin, 12/2013, xrayed again, advanced DJD/OA ; CSI   12/2013, 3/2014, 6/2014, 12/2014      Formatting of this note might be different from the original.  Xrays 2011; Ortho Dr Olguin, 12/2013, xrayed again, advanced DJD/OA ; CSI 12/2013, 3/2014, 6/2014, 12/2014     • Pneumonia 12/06/2012     Overview Note:     Spring 2012, treated outpt, [facility]       • Seasonal allergic rhinitis 12/06/2012     Overview Note:     Spring and Fall worst       • Postmenopause 12/06/2012           PAST SURGICAL HISTORY     Past Surgical History:   Procedure Laterality Date   • BREAST BIOPSY Right 12/17/2012    Benign, calcifications; [place]; Fibrosis   • COLONOSCOPY  04/14/2014    Normal, Repeat 10 yrs, Dr. Wheeler   • COLONOSCOPY W/ BIOPSIES AND POLYPECTOMY  04/22/2024    2 polyps removed, mild diverticulosis, repeat 3-10 pending results, per Dr Almeida @ [facility]   • ESOPHAGOGASTRODUODENOSCOPY  04/22/2024    Grade B Reflux Esophagitis   • ESOPHAGOGASTRODUODENOSCOPY  08/22/2024    Hiatal Hernia, Gastric Polyps, Barretts Esophagus, Dr. Almeida   • SKIN BIOPSY  2014    BCC removed from right upper back   • TLH AND BSO (CERVIX REMOVED)  09/24/2015    Total Lap Hysterectomy and BSO; Endometrial cancer; grade 1 endometrioid adenocarcinoma.; Dr. Chai Pinto   • TOTAL KNEE ARTHROPLASTY Left 08/2016    Dr Crain   • WISDOM TOOTH EXTRACTION  1976, 18 yo         MEDICATIONS     Current Outpatient Medications

## 2025-02-07 ENCOUNTER — HOSPITAL ENCOUNTER (OUTPATIENT)
Facility: HOSPITAL | Age: 65
Discharge: HOME | End: 2025-02-10
Payer: MEDICARE

## 2025-02-07 DIAGNOSIS — G89.29 CHRONIC LEFT-SIDED LOW BACK PAIN WITHOUT SCIATICA: ICD-10-CM

## 2025-02-07 DIAGNOSIS — M54.50 ACUTE EXACERBATION OF CHRONIC LOW BACK PAIN: ICD-10-CM

## 2025-02-07 DIAGNOSIS — G89.29 ACUTE EXACERBATION OF CHRONIC LOW BACK PAIN: ICD-10-CM

## 2025-02-07 DIAGNOSIS — M54.50 CHRONIC LEFT-SIDED LOW BACK PAIN WITHOUT SCIATICA: ICD-10-CM

## 2025-02-07 PROCEDURE — 72100 X-RAY EXAM L-S SPINE 2/3 VWS: CPT

## 2025-02-10 ENCOUNTER — HOSPITAL ENCOUNTER (OUTPATIENT)
Facility: HOSPITAL | Age: 65
Setting detail: RECURRING SERIES
Discharge: HOME | End: 2025-02-13
Attending: FAMILY MEDICINE
Payer: MEDICARE

## 2025-02-10 PROCEDURE — 97110 THERAPEUTIC EXERCISES: CPT

## 2025-02-10 PROCEDURE — 97161 PT EVAL LOW COMPLEX 20 MIN: CPT

## 2025-02-10 NOTE — THERAPY EVALUATION
Physical Therapy at [facility],   a part of [facility]  [address], [place] [address]  Phone:[phone] Fax:[phone]                                                                            PHYSICAL THERAPY - MEDICARE EVALUATION/PLAN OF CARE NOTE (updated 3/23)      Date: 2/10/2025          Patient Name:  Candis Martinez :  1960   Medical   Diagnosis:  Acute exacerbation of chronic low back pain [M54.50, G89.29]  Chronic left-sided low back pain without sciatica [M54.50, G89.29] Treatment Diagnosis:  M54.59, G89.29  CHRONIC LOWER BACK PAIN    Referral Source:  Roxanne Kilpatrick,* Provider #:  [id number]                  Insurance: Payor: MEDICARE / Plan: MEDICARE PART A AND B / Product Type: *No Product type* /      Patient  verified yes     Visit #   Current  / Total 1 24   Time   In / Out 3:00 P 4:00 P   Total Treatment Time 60   Total Timed Codes 15   1:1 Treatment Time 15      [facility] Totals Reminder:  bill using total billable   min of TIMED therapeutic procedures and modalities.   8-22 min = 1 unit; 23-37 min = 2 units; 38-52 min = 3 units;  53-67 min = 4 units; 68-82 min = 5 units           SUBJECTIVE  Pain Level (0-10 scale): 2-3  []constant [x]intermittent []improving []worsening []no change since onset    Any medication changes, allergies to medications, adverse drug reactions, diagnosis change, or new procedure performed?: [x] No    [] Yes (see summary sheet for update)  Medications: Verified on Patient Summary List    Subjective functional status/changes:     Pt presents with acute on chronic L sided LBP. A few weeks ago she bent forward to tie her shoes and \"twisted weird\"; immediate onset of LBP. States she has dealt with these symptoms on and off for several years; last onset was summer '24. She is currently finishing prednisone pack which has helped significantly. She also has muscle relaxers. Had xrays taken last week.  States

## 2025-02-13 ENCOUNTER — HOSPITAL ENCOUNTER (OUTPATIENT)
Facility: HOSPITAL | Age: 65
Setting detail: RECURRING SERIES
Discharge: HOME | End: 2025-02-16
Attending: FAMILY MEDICINE
Payer: MEDICARE

## 2025-02-13 PROCEDURE — 97110 THERAPEUTIC EXERCISES: CPT

## 2025-02-13 PROCEDURE — 97140 MANUAL THERAPY 1/> REGIONS: CPT

## 2025-02-13 NOTE — PROGRESS NOTES
PHYSICAL THERAPY - MEDICARE DAILY TREATMENT NOTE (updated 3/23)      Date: 2025          Patient Name:  Candis Martinez :  1960   Medical   Diagnosis:  Acute exacerbation of chronic low back pain [M54.50, G89.29]  Chronic left-sided low back pain without sciatica [M54.50, G89.29] Treatment Diagnosis:  M54.59, G89.29  CHRONIC LOWER BACK PAIN    Referral Source:  Roxanne Kilpatrick,* Insurance:   Payor: MEDICARE / Plan: MEDICARE PART A AND B / Product Type: *No Product type* /                     Patient  verified yes     Visit #   Current  / Total 2 24   Time   In / Out 11:30 A 12:35 P   Total Treatment Time 65   Total Timed Codes 55   1:1 Treatment Time 55      [facility] Totals Reminder:  bill using total billable   min of TIMED therapeutic procedures and modalities.   8-22 min = 1 unit; 23-37 min = 2 units; 38-52 min = 3 units; 53-67 min = 4 units; 68-82 min = 5 units            SUBJECTIVE    Pain Level (0-10 scale): 1    Any medication changes, allergies to medications, adverse drug reactions, diagnosis change, or new procedure performed?: [x] No    [] Yes (see summary sheet for update)  Medications: Verified on Patient Summary List    Subjective functional status/changes:     States that she finished prednisone pack yesterday; she has very little pain today    OBJECTIVE      Therapeutic Procedures:  Tx Min Billable or 1:1 Min (if diff from Tx Min) Procedure, Rationale, Specifics   40  [id number] Therapeutic Exercise (timed):  increase ROM, strength, coordination, balance, and proprioception to improve patient's ability to progress to PLOF and address remaining functional goals. (see flow sheet as applicable)     Details if applicable:     15  17516 Manual Therapy (timed):  decrease pain, increase tissue extensibility, and decrease trigger points to improve patient's ability to progress to PLOF and address remaining functional goals.  The manual therapy interventions were performed at a separate

## 2025-02-15 ENCOUNTER — TELEPHONE (OUTPATIENT)
Age: 65
End: 2025-02-15

## 2025-02-15 DIAGNOSIS — M54.50 CHRONIC LEFT-SIDED LOW BACK PAIN WITHOUT SCIATICA: Primary | ICD-10-CM

## 2025-02-15 DIAGNOSIS — G89.29 CHRONIC LEFT-SIDED LOW BACK PAIN WITHOUT SCIATICA: Primary | ICD-10-CM

## 2025-02-15 NOTE — TELEPHONE ENCOUNTER
Advise pt the radiologist reports that her low back xray does show degenerative changes at multiple levels. Follow the recommendations as discussed at her recent appt. If not getting better w/ PT and/or if she gets another acute flare up, see Ortho back specialist. Give info for [facility] and [facility].

## 2025-02-17 NOTE — TELEPHONE ENCOUNTER
NIMESH for return call.    I left detailed VM for pt.  I gave her the name and # for Dr Guzman or one of his PA's @ [place].  I also gave her the # for [facility].  Asked her to call me back if any questions.

## 2025-02-26 ENCOUNTER — HOSPITAL ENCOUNTER (OUTPATIENT)
Facility: HOSPITAL | Age: 65
Setting detail: RECURRING SERIES
Discharge: HOME | End: 2025-03-01
Attending: FAMILY MEDICINE
Payer: MEDICARE

## 2025-02-26 PROCEDURE — G0283 ELEC STIM OTHER THAN WOUND: HCPCS

## 2025-02-26 PROCEDURE — 97140 MANUAL THERAPY 1/> REGIONS: CPT

## 2025-02-26 PROCEDURE — 97110 THERAPEUTIC EXERCISES: CPT

## 2025-02-26 NOTE — PROGRESS NOTES
progress to PLOF and address remaining functional goals.  The manual therapy interventions were performed at a separate and distinct time from the therapeutic activities interventions . (see flow sheet as applicable)     Details if applicable:  STM L>R lumbar paraspinals   55     Total Total         Modalities Rationale:     decrease inflammation and decrease pain to improve patient's ability to progress to PLOF and address remaining functional goals.    10   min [x] Estim Unattended,             type/location: IFC, lumbar       [x]  w/ice    []  w/heat        min [] Estim Attended,             type/location:       []  w/ice   []  w/heat         []  w/US   []  TENS insruct            min []  Mechanical Traction,        type/lbs:        []  pro      []  sup           []  int       []  cont            []  before manual           []  after manual     min []  Ultrasound,         settings/location:      min  unbilled [x]  Ice     []  Heat            location/position: lumbar         min []  Vasopneumatic Device,      press/temp:   pre-treatment girth :    post-treatment girth :    measured at (landmark       location) :   If using vaso (only need to measure limb vaso being performed on)        min []  Other:      Skin assessment post-treatment (if applicable):    [x]  intact    []  redness- no adverse reaction                 []redness - adverse reaction:          [x]  Patient Education billed concurrently with other procedures   [x] Review HEP    [] Progressed/Changed HEP, detail:    [] Other detail:         Other Objective/Functional Measures  Mod/max TTP L glutes, piriformis, QL    Pain Level at end of session (0-10 scale): 0      Assessment   Very good tolerance to PT session today  Patient will continue to benefit from skilled PT / OT services to modify and progress therapeutic interventions, analyze and address functional mobility deficits, analyze and address ROM deficits, analyze and address strength deficits,

## 2025-02-28 ENCOUNTER — APPOINTMENT (OUTPATIENT)
Facility: HOSPITAL | Age: 65
End: 2025-02-28
Attending: FAMILY MEDICINE
Payer: MEDICARE

## 2025-03-04 ENCOUNTER — HOSPITAL ENCOUNTER (OUTPATIENT)
Facility: HOSPITAL | Age: 65
Setting detail: RECURRING SERIES
Discharge: HOME | End: 2025-03-07
Attending: FAMILY MEDICINE
Payer: MEDICARE

## 2025-03-04 PROCEDURE — 97140 MANUAL THERAPY 1/> REGIONS: CPT

## 2025-03-04 PROCEDURE — 97110 THERAPEUTIC EXERCISES: CPT

## 2025-03-04 NOTE — PROGRESS NOTES
patterns, and analyze and modify for postural abnormalities to address functional deficits and attain remaining goals.    Progress toward goals / Updated goals:  []  See Progress Note/Recertification  NT      PLAN  Yes  Continue plan of care  Re-Cert Due: see eval  []  Upgrade activities as tolerated  []  Discharge due to:  []  Other:      Vernell Akers, PTA       3/4/2025       10:52 AM

## 2025-03-06 ENCOUNTER — HOSPITAL ENCOUNTER (OUTPATIENT)
Facility: HOSPITAL | Age: 65
Setting detail: RECURRING SERIES
Discharge: HOME | End: 2025-03-09
Attending: FAMILY MEDICINE
Payer: MEDICARE

## 2025-03-06 PROCEDURE — 97140 MANUAL THERAPY 1/> REGIONS: CPT

## 2025-03-06 PROCEDURE — 97110 THERAPEUTIC EXERCISES: CPT

## 2025-03-06 NOTE — PROGRESS NOTES
PHYSICAL THERAPY - MEDICARE DAILY TREATMENT NOTE (updated 3/23)      Date: 3/6/2025          Patient Name:  Candis Martinez :  1960   Medical   Diagnosis:  Acute exacerbation of chronic low back pain [M54.50, G89.29]  Chronic left-sided low back pain without sciatica [M54.50, G89.29] Treatment Diagnosis:  M54.59, G89.29  CHRONIC LOWER BACK PAIN    Referral Source:  Roxanne Kilpatrick,* Insurance:   Payor: MEDICARE / Plan: MEDICARE PART A AND B / Product Type: *No Product type* /                     Patient  verified yes     Visit #   Current  / Total 5 24   Time   In / Out 1130 A 1230 P   Total Treatment Time 60   Total Timed Codes 50   1:1 Treatment Time 50      [facility] Totals Reminder:  bill using total billable   min of TIMED therapeutic procedures and modalities.   8-22 min = 1 unit; 23-37 min = 2 units; 38-52 min = 3 units; 53-67 min = 4 units; 68-82 min = 5 units            SUBJECTIVE    Pain Level (0-10 scale): 1    Any medication changes, allergies to medications, adverse drug reactions, diagnosis change, or new procedure performed?: [x] No    [] Yes (see summary sheet for update)  Medications: Verified on Patient Summary List    Subjective functional status/changes:     Pt reports that her back is hurting a little today. \"It feels weak\"    OBJECTIVE      Therapeutic Procedures:  Tx Min Billable or 1:1 Min (if diff from Tx Min) Procedure, Rationale, Specifics   35  16370 Therapeutic Exercise (timed):  increase ROM, strength, coordination, balance, and proprioception to improve patient's ability to progress to PLOF and address remaining functional goals. (see flow sheet as applicable)     Details if applicable:     05 13435 Manual Therapy (timed):  decrease pain, increase tissue extensibility, and decrease trigger points to improve patient's ability to progress to PLOF and address remaining functional goals.  The manual therapy interventions were performed at a separate and distinct time

## 2025-03-10 ENCOUNTER — HOSPITAL ENCOUNTER (OUTPATIENT)
Facility: HOSPITAL | Age: 65
Setting detail: RECURRING SERIES
Discharge: HOME | End: 2025-03-13
Attending: FAMILY MEDICINE
Payer: MEDICARE

## 2025-03-10 PROCEDURE — 97110 THERAPEUTIC EXERCISES: CPT

## 2025-03-10 PROCEDURE — 97140 MANUAL THERAPY 1/> REGIONS: CPT

## 2025-03-10 NOTE — PROGRESS NOTES
PHYSICAL THERAPY - MEDICARE DAILY TREATMENT/progress NOTE (updated 3/23)      Date: 3/10/2025          Patient Name:  Candis Martinez :  1960   Medical   Diagnosis:  Acute exacerbation of chronic low back pain [M54.50, G89.29]  Chronic left-sided low back pain without sciatica [M54.50, G89.29] Treatment Diagnosis:  M54.59, G89.29  CHRONIC LOWER BACK PAIN    Referral Source:  Roxanne Kilpatrick,* Insurance:   Payor: MEDICARE / Plan: MEDICARE PART A AND B / Product Type: *No Product type* /                     Patient  verified yes     Visit #   Current  / Total 6 24   Time   In / Out 930 A 1030 A   Total Treatment Time 60   Total Timed Codes 50   1:1 Treatment Time 40      [facility] Totals Reminder:  bill using total billable   min of TIMED therapeutic procedures and modalities.   8-22 min = 1 unit; 23-37 min = 2 units; 38-52 min = 3 units; 53-67 min = 4 units; 68-82 min = 5 units            SUBJECTIVE    Pain Level (0-10 scale): 0    Any medication changes, allergies to medications, adverse drug reactions, diagnosis change, or new procedure performed?: [x] No    [] Yes (see summary sheet for update)  Medications: Verified on Patient Summary List    Subjective functional status/changes:     Pt overall doing better, she felt good over the weekend. She was able to do chores around the house without aggravation of lower back pain. States that she is careful with certain movements, lifting     OBJECTIVE      Therapeutic Procedures:  Tx Min Billable or 1:1 Min (if diff from Tx Min) Procedure, Rationale, Specifics   35 68 [id number] Therapeutic Exercise (timed):  increase ROM, strength, coordination, balance, and proprioception to improve patient's ability to progress to PLOF and address remaining functional goals. (see flow sheet as applicable)     Details if applicable:     15 15 97501 Manual Therapy (timed):  decrease pain, increase tissue extensibility, and decrease trigger points to improve patient's

## 2025-03-12 ENCOUNTER — HOSPITAL ENCOUNTER (OUTPATIENT)
Facility: HOSPITAL | Age: 65
Setting detail: RECURRING SERIES
Discharge: HOME | End: 2025-03-15
Attending: FAMILY MEDICINE
Payer: MEDICARE

## 2025-03-12 PROCEDURE — 97140 MANUAL THERAPY 1/> REGIONS: CPT

## 2025-03-12 PROCEDURE — 97110 THERAPEUTIC EXERCISES: CPT

## 2025-03-12 NOTE — PROGRESS NOTES
PHYSICAL THERAPY - MEDICARE DAILY TREATMENT NOTE (updated 3/23)      Date: 3/12/2025          Patient Name:  Candis Martinez :  1960   Medical   Diagnosis:  Acute exacerbation of chronic low back pain [M54.50, G89.29]  Chronic left-sided low back pain without sciatica [M54.50, G89.29] Treatment Diagnosis:  M54.59, G89.29  CHRONIC LOWER BACK PAIN    Referral Source:  Roxanne Kilpatrick,* Insurance:   Payor: MEDICARE / Plan: MEDICARE PART A AND B / Product Type: *No Product type* /                     Patient  verified yes     Visit #   Current  / Total 7 24   Time   In / Out 1030 A 11:25 A   Total Treatment Time 55   Total Timed Codes 45   1:1 Treatment Time 30      [facility] Totals Reminder:  bill using total billable   min of TIMED therapeutic procedures and modalities.   8-22 min = 1 unit; 23-37 min = 2 units; 38-52 min = 3 units; 53-67 min = 4 units; 68-82 min = 5 units            SUBJECTIVE    Pain Level (0-10 scale): 0    Any medication changes, allergies to medications, adverse drug reactions, diagnosis change, or new procedure performed?: [x] No    [] Yes (see summary sheet for update)  Medications: Verified on Patient Summary List    Subjective functional status/changes:     \"I'm doing good!\" States that her back has been feeling much better    OBJECTIVE      Therapeutic Procedures:  Tx Min Billable or 1:1 Min (if diff from Tx Min) Procedure, Rationale, Specifics   30 15 [id number] Therapeutic Exercise (timed):  increase ROM, strength, coordination, balance, and proprioception to improve patient's ability to progress to PLOF and address remaining functional goals. (see flow sheet as applicable)     Details if applicable:     15 15 26340 Manual Therapy (timed):  decrease pain, increase tissue extensibility, and decrease trigger points to improve patient's ability to progress to PLOF and address remaining functional goals.  The manual therapy interventions were performed at a separate and distinct

## 2025-03-18 ENCOUNTER — APPOINTMENT (OUTPATIENT)
Facility: HOSPITAL | Age: 65
End: 2025-03-18
Attending: FAMILY MEDICINE
Payer: MEDICARE

## 2025-03-20 ENCOUNTER — HOSPITAL ENCOUNTER (OUTPATIENT)
Facility: HOSPITAL | Age: 65
Setting detail: RECURRING SERIES
Discharge: HOME | End: 2025-03-23
Attending: FAMILY MEDICINE
Payer: MEDICARE

## 2025-03-20 PROCEDURE — 97110 THERAPEUTIC EXERCISES: CPT

## 2025-03-20 PROCEDURE — 97140 MANUAL THERAPY 1/> REGIONS: CPT

## 2025-03-20 NOTE — PROGRESS NOTES
PHYSICAL THERAPY - MEDICARE DAILY TREATMENT NOTE (updated 3/23)      Date: 3/20/2025          Patient Name:  Candis Martinez :  1960   Medical   Diagnosis:  Acute exacerbation of chronic low back pain [M54.50, G89.29]  Chronic left-sided low back pain without sciatica [M54.50, G89.29] Treatment Diagnosis:  M54.59, G89.29  CHRONIC LOWER BACK PAIN    Referral Source:  Roxanne Kilpatrick,* Insurance:   Payor: MEDICARE / Plan: MEDICARE PART A AND B / Product Type: *No Product type* /                     Patient  verified yes     Visit #   Current  / Total 8 24   Time   In / Out 130 P 225 P   Total Treatment Time 55   Total Timed Codes 45   1:1 Treatment Time 30      [facility] Totals Reminder:  bill using total billable   min of TIMED therapeutic procedures and modalities.   8-22 min = 1 unit; 23-37 min = 2 units; 38-52 min = 3 units; 53-67 min = 4 units; 68-82 min = 5 units            SUBJECTIVE    Pain Level (0-10 scale): 0    Any medication changes, allergies to medications, adverse drug reactions, diagnosis change, or new procedure performed?: [x] No    [] Yes (see summary sheet for update)  Medications: Verified on Patient Summary List    Subjective functional status/changes:     Pt reports not having pain right now.     OBJECTIVE      Therapeutic Procedures:  Tx Min Billable or 1:1 Min (if diff from Tx Min) Procedure, Rationale, Specifics   30 15 15799 Therapeutic Exercise (timed):  increase ROM, strength, coordination, balance, and proprioception to improve patient's ability to progress to PLOF and address remaining functional goals. (see flow sheet as applicable)     Details if applicable:     15 15 56486 Manual Therapy (timed):  decrease pain, increase tissue extensibility, and decrease trigger points to improve patient's ability to progress to PLOF and address remaining functional goals.  The manual therapy interventions were performed at a separate and distinct time from the therapeutic

## 2025-03-25 ENCOUNTER — HOSPITAL ENCOUNTER (OUTPATIENT)
Facility: HOSPITAL | Age: 65
Setting detail: RECURRING SERIES
Discharge: HOME | End: 2025-03-28
Attending: FAMILY MEDICINE
Payer: MEDICARE

## 2025-03-25 PROCEDURE — 97140 MANUAL THERAPY 1/> REGIONS: CPT

## 2025-03-25 PROCEDURE — 97110 THERAPEUTIC EXERCISES: CPT

## 2025-03-25 NOTE — PROGRESS NOTES
PHYSICAL THERAPY - MEDICARE DAILY TREATMENT NOTE (updated 3/23)      Date: 3/25/2025          Patient Name:  Candis Martinez :  1960   Medical   Diagnosis:  Acute exacerbation of chronic low back pain [M54.50, G89.29]  Chronic left-sided low back pain without sciatica [M54.50, G89.29] Treatment Diagnosis:  M54.59, G89.29  CHRONIC LOWER BACK PAIN    Referral Source:  Roxanne Kilpatrick,* Insurance:   Payor: MEDICARE / Plan: MEDICARE PART A AND B / Product Type: *No Product type* /                     Patient  verified yes     Visit #   Current  / Total 9 24   Time   In / Out 1:00 P 2:00 P   Total Treatment Time 60   Total Timed Codes 60   1:1 Treatment Time 40      [facility] Totals Reminder:  bill using total billable   min of TIMED therapeutic procedures and modalities.   8-22 min = 1 unit; 23-37 min = 2 units; 38-52 min = 3 units; 53-67 min = 4 units; 68-82 min = 5 units            SUBJECTIVE    Pain Level (0-10 scale): 0    Any medication changes, allergies to medications, adverse drug reactions, diagnosis change, or new procedure performed?: [x] No    [] Yes (see summary sheet for update)  Medications: Verified on Patient Summary List    Subjective functional status/changes:     Pt doing very well, much better. She helped her son with 3 granddaughters over the weekend and was able to /carry/bath the 4 mo old without inc'd pain.     OBJECTIVE      Therapeutic Procedures:  Tx Min Billable or 1:1 Min (if diff from Tx Min) Procedure, Rationale, Specifics   50 09 31182 Therapeutic Exercise (timed):  increase ROM, strength, coordination, balance, and proprioception to improve patient's ability to progress to PLOF and address remaining functional goals. (see flow sheet as applicable)     Details if applicable:     10 10 53855 Manual Therapy (timed):  decrease pain, increase tissue extensibility, and decrease trigger points to improve patient's ability to progress to PLOF and address remaining

## 2025-03-27 ENCOUNTER — HOSPITAL ENCOUNTER (OUTPATIENT)
Facility: HOSPITAL | Age: 65
Setting detail: RECURRING SERIES
Discharge: HOME | End: 2025-03-30
Attending: FAMILY MEDICINE
Payer: MEDICARE

## 2025-03-27 PROCEDURE — 97110 THERAPEUTIC EXERCISES: CPT

## 2025-03-27 NOTE — PROGRESS NOTES
PHYSICAL THERAPY - MEDICARE DAILY TREATMENT NOTE (updated 3/23)      Date: 3/27/2025          Patient Name:  Candis Martinez :  1960   Medical   Diagnosis:  Acute exacerbation of chronic low back pain [M54.50, G89.29]  Chronic left-sided low back pain without sciatica [M54.50, G89.29] Treatment Diagnosis:  M54.59, G89.29  CHRONIC LOWER BACK PAIN    Referral Source:  Roxanne Kilpatrick,* Insurance:   Payor: MEDICARE / Plan: MEDICARE PART A AND B / Product Type: *No Product type* /                     Patient  verified yes     Visit #   Current  / Total 10 24   Time   In / Out 1:00 P 1:50 P   Total Treatment Time 50   Total Timed Codes 50   1:1 Treatment Time 40      [facility] Totals Reminder:  bill using total billable   min of TIMED therapeutic procedures and modalities.   8-22 min = 1 unit; 23-37 min = 2 units; 38-52 min = 3 units; 53-67 min = 4 units; 68-82 min = 5 units            SUBJECTIVE    Pain Level (0-10 scale): 0    Any medication changes, allergies to medications, adverse drug reactions, diagnosis change, or new procedure performed?: [x] No    [] Yes (see summary sheet for update)  Medications: Verified on Patient Summary List    Subjective functional status/changes:     \"Good!\" Reports no pain today; she has been feeling good    OBJECTIVE      Therapeutic Procedures:  Tx Min Billable or 1:1 Min (if diff from Tx Min) Procedure, Rationale, Specifics   50 40 17179 Therapeutic Exercise (timed):  increase ROM, strength, coordination, balance, and proprioception to improve patient's ability to progress to PLOF and address remaining functional goals. (see flow sheet as applicable)     Details if applicable:     -- -- 93978 Manual Therapy (timed):  decrease pain, increase tissue extensibility, and decrease trigger points to improve patient's ability to progress to PLOF and address remaining functional goals.  The manual therapy interventions were performed at a separate and distinct time from

## 2025-03-31 ENCOUNTER — APPOINTMENT (OUTPATIENT)
Facility: HOSPITAL | Age: 65
End: 2025-03-31
Attending: FAMILY MEDICINE
Payer: MEDICARE

## 2025-04-01 ENCOUNTER — HOSPITAL ENCOUNTER (OUTPATIENT)
Facility: HOSPITAL | Age: 65
Setting detail: RECURRING SERIES
Discharge: HOME | End: 2025-04-04
Attending: FAMILY MEDICINE
Payer: MEDICARE

## 2025-04-01 PROCEDURE — 97110 THERAPEUTIC EXERCISES: CPT

## 2025-04-01 NOTE — PROGRESS NOTES
PHYSICAL THERAPY - MEDICARE DAILY TREATMENT NOTE (updated 3/23)      Date: 2025          Patient Name:  Candis Martinez :  1960   Medical   Diagnosis:  Acute exacerbation of chronic low back pain [M54.50, G89.29]  Chronic left-sided low back pain without sciatica [M54.50, G89.29] Treatment Diagnosis:  M54.59, G89.29  CHRONIC LOWER BACK PAIN    Referral Source:  Roxanne Kilpatrick,* Insurance:   Payor: MEDICARE / Plan: MEDICARE PART A AND B / Product Type: *No Product type* /                     Patient  verified yes     Visit #   Current  / Total  24   Time   In / Out 9:35 A 10:25 A   Total Treatment Time 50   Total Timed Codes 50   1:1 Treatment Time 30      [facility] Totals Reminder:  bill using total billable   min of TIMED therapeutic procedures and modalities.   8-22 min = 1 unit; 23-37 min = 2 units; 38-52 min = 3 units; 53-67 min = 4 units; 68-82 min = 5 units            SUBJECTIVE    Pain Level (0-10 scale): 0    Any medication changes, allergies to medications, adverse drug reactions, diagnosis change, or new procedure performed?: [x] No    [] Yes (see summary sheet for update)  Medications: Verified on Patient Summary List    Subjective functional status/changes:     Reports that her back is feeling really good    OBJECTIVE      Therapeutic Procedures:  Tx Min Billable or 1:1 Min (if diff from Tx Min) Procedure, Rationale, Specifics   50 30 71100 Therapeutic Exercise (timed):  increase ROM, strength, coordination, balance, and proprioception to improve patient's ability to progress to PLOF and address remaining functional goals. (see flow sheet as applicable)     Details if applicable:     -- -- 55092 Manual Therapy (timed):  decrease pain, increase tissue extensibility, and decrease trigger points to improve patient's ability to progress to PLOF and address remaining functional goals.  The manual therapy interventions were performed at a separate and distinct time from the therapeutic

## 2025-04-02 ENCOUNTER — APPOINTMENT (OUTPATIENT)
Facility: HOSPITAL | Age: 65
End: 2025-04-02
Attending: FAMILY MEDICINE
Payer: MEDICARE

## 2025-04-07 ENCOUNTER — HOSPITAL ENCOUNTER (OUTPATIENT)
Facility: HOSPITAL | Age: 65
Setting detail: RECURRING SERIES
Discharge: HOME | End: 2025-04-10
Attending: FAMILY MEDICINE
Payer: MEDICARE

## 2025-04-07 PROCEDURE — 97110 THERAPEUTIC EXERCISES: CPT

## 2025-04-07 NOTE — PROGRESS NOTES
PHYSICAL THERAPY - MEDICARE DAILY TREATMENT NOTE (updated 3/23)      Date: 2025          Patient Name:  Candis Martinez :  1960   Medical   Diagnosis:  Acute exacerbation of chronic low back pain [M54.50, G89.29]  Chronic left-sided low back pain without sciatica [M54.50, G89.29] Treatment Diagnosis:  M54.59, G89.29  CHRONIC LOWER BACK PAIN    Referral Source:  Roxanne Kilpatrick,* Insurance:   Payor: MEDICARE / Plan: MEDICARE PART A AND B / Product Type: *No Product type* /                     Patient  verified yes     Visit #   Current  / Total 12 24   Time   In / Out 930 A 10:20 A   Total Treatment Time 50   Total Timed Codes 50   1:1 Treatment Time 40      [facility] Totals Reminder:  bill using total billable   min of TIMED therapeutic procedures and modalities.   8-22 min = 1 unit; 23-37 min = 2 units; 38-52 min = 3 units; 53-67 min = 4 units; 68-82 min = 5 units            SUBJECTIVE    Pain Level (0-10 scale): 0    Any medication changes, allergies to medications, adverse drug reactions, diagnosis change, or new procedure performed?: [x] No    [] Yes (see summary sheet for update)  Medications: Verified on Patient Summary List    Subjective functional status/changes:     Continuing to feel very good; traveled to [place] this past weekend, poncho to  and play with grandchildren without LBP    OBJECTIVE      Therapeutic Procedures:  Tx Min Billable or 1:1 Min (if diff from Tx Min) Procedure, Rationale, Specifics   50 40 61816 Therapeutic Exercise (timed):  increase ROM, strength, coordination, balance, and proprioception to improve patient's ability to progress to PLOF and address remaining functional goals. (see flow sheet as applicable)     Details if applicable:     -- -- 26864 Manual Therapy (timed):  decrease pain, increase tissue extensibility, and decrease trigger points to improve patient's ability to progress to PLOF and address remaining functional goals.  The manual therapy

## 2025-04-21 ENCOUNTER — APPOINTMENT (OUTPATIENT)
Facility: HOSPITAL | Age: 65
End: 2025-04-21
Attending: FAMILY MEDICINE
Payer: MEDICARE

## 2025-04-23 ENCOUNTER — HOSPITAL ENCOUNTER (OUTPATIENT)
Facility: HOSPITAL | Age: 65
Setting detail: RECURRING SERIES
Discharge: HOME | End: 2025-04-26
Attending: FAMILY MEDICINE
Payer: MEDICARE

## 2025-04-23 PROCEDURE — 97110 THERAPEUTIC EXERCISES: CPT

## 2025-04-23 NOTE — PROGRESS NOTES
PHYSICAL THERAPY - MEDICARE DAILY TREATMENT NOTE (updated 3/23)      Date: 2025          Patient Name:  Candis Martinez :  1960   Medical   Diagnosis:  Acute exacerbation of chronic low back pain [M54.50, G89.29]  Chronic left-sided low back pain without sciatica [M54.50, G89.29] Treatment Diagnosis:  M54.59, G89.29  CHRONIC LOWER BACK PAIN    Referral Source:  Roxanne Kilpatrick,* Insurance:   Payor: MEDICARE / Plan: MEDICARE PART A AND B / Product Type: *No Product type* /                     Patient  verified yes     Visit #   Current  / Total 13 24   Time   In / Out 2:00 P 2:40 P   Total Treatment Time 40   Total Timed Codes 40   1:1 Treatment Time 30      [facility] Totals Reminder:  bill using total billable   min of TIMED therapeutic procedures and modalities.   8-22 min = 1 unit; 23-37 min = 2 units; 38-52 min = 3 units; 53-67 min = 4 units; 68-82 min = 5 units            SUBJECTIVE    Pain Level (0-10 scale): 0    Any medication changes, allergies to medications, adverse drug reactions, diagnosis change, or new procedure performed?: [x] No    [] Yes (see summary sheet for update)  Medications: Verified on Patient Summary List    Subjective functional status/changes:     States that she helped her son and 3 grandchildren last  and Thurs night-- had some back discomfort on Friday and took a muscle relaxer. Symptoms went away quickly and she has felt fine since. She is worried about her back pain returning and is very mindful of her movements  She has been doing HEP ~3x/wk    OBJECTIVE      Therapeutic Procedures:  Tx Min Billable or 1:1 Min (if diff from Tx Min) Procedure, Rationale, Specifics   40 30 23902 Therapeutic Exercise (timed):  increase ROM, strength, coordination, balance, and proprioception to improve patient's ability to progress to PLOF and address remaining functional goals. (see flow sheet as applicable)     Details if applicable:     -- -- 29729 Manual Therapy

## 2025-04-29 ENCOUNTER — OFFICE VISIT (OUTPATIENT)
Age: 65
End: 2025-04-29

## 2025-04-29 VITALS
SYSTOLIC BLOOD PRESSURE: 136 MMHG | OXYGEN SATURATION: 95 % | RESPIRATION RATE: 18 BRPM | WEIGHT: 227 LBS | TEMPERATURE: 98.5 F | HEIGHT: 69 IN | HEART RATE: 90 BPM | BODY MASS INDEX: 33.62 KG/M2 | DIASTOLIC BLOOD PRESSURE: 90 MMHG

## 2025-04-29 DIAGNOSIS — J04.0 LARYNGITIS: ICD-10-CM

## 2025-04-29 DIAGNOSIS — J06.9 VIRAL UPPER RESPIRATORY TRACT INFECTION: Primary | ICD-10-CM

## 2025-04-29 ASSESSMENT — ENCOUNTER SYMPTOMS
NAUSEA: 0
SHORTNESS OF BREATH: 0
DIARRHEA: 0
COUGH: 0
VOMITING: 0
SORE THROAT: 1

## 2025-04-29 NOTE — PATIENT INSTRUCTIONS
Patient will continue Zyrtec for her allergies, recommend Mucinex D 12-hour formulation plenty of fluids Tylenol and Motrin and follow-up with PCP if needed

## 2025-05-08 ENCOUNTER — HOSPITAL ENCOUNTER (OUTPATIENT)
Facility: HOSPITAL | Age: 65
Setting detail: RECURRING SERIES
Discharge: HOME | End: 2025-05-11
Attending: FAMILY MEDICINE
Payer: MEDICARE

## 2025-05-08 PROCEDURE — 97110 THERAPEUTIC EXERCISES: CPT

## 2025-05-08 NOTE — PROGRESS NOTES
PHYSICAL THERAPY - MEDICARE DAILY TREATMENT/Discharge NOTE (updated 3/23)      Date: 2025          Patient Name:  Candis Martinez :  1960   Medical   Diagnosis:  Acute exacerbation of chronic low back pain [M54.50, G89.29]  Chronic left-sided low back pain without sciatica [M54.50, G89.29] Treatment Diagnosis:  M54.59, G89.29  CHRONIC LOWER BACK PAIN    Referral Source:  Roxanne Kilpatrick,* Insurance:   Payor: MEDICARE / Plan: MEDICARE PART A AND B / Product Type: *No Product type* /                     Patient  verified yes     Visit #   Current  / Total 14 24   Time   In / Out 1:00 P 1:45 P   Total Treatment Time 45   Total Timed Codes 45   1:1 Treatment Time 25      [facility] Totals Reminder:  bill using total billable   min of TIMED therapeutic procedures and modalities.   8-22 min = 1 unit; 23-37 min = 2 units; 38-52 min = 3 units; 53-67 min = 4 units; 68-82 min = 5 units            SUBJECTIVE    Pain Level (0-10 scale): 0    Any medication changes, allergies to medications, adverse drug reactions, diagnosis change, or new procedure performed?: [x] No    [] Yes (see summary sheet for update)  Medications: Verified on Patient Summary List    Subjective functional status/changes:     Reports that her back has been great. She has been mindful of her activity/movements and has been able to keep up with her HEP the last few weeks.    OBJECTIVE      Therapeutic Procedures:  Tx Min Billable or 1:1 Min (if diff from Tx Min) Procedure, Rationale, Specifics   45 25 [id number] Therapeutic Exercise (timed):  increase ROM, strength, coordination, balance, and proprioception to improve patient's ability to progress to PLOF and address remaining functional goals. (see flow sheet as applicable)     Details if applicable:     -- -- 84388 Manual Therapy (timed):  decrease pain, increase tissue extensibility, and decrease trigger points to improve patient's ability to progress to PLOF and address remaining

## 2025-08-07 ENCOUNTER — PATIENT MESSAGE (OUTPATIENT)
Age: 65
End: 2025-08-07

## 2025-08-07 ENCOUNTER — HOSPITAL ENCOUNTER (EMERGENCY)
Facility: HOSPITAL | Age: 65
Discharge: HOME | End: 2025-08-07
Attending: EMERGENCY MEDICINE
Payer: MEDICARE

## 2025-08-07 ENCOUNTER — APPOINTMENT (OUTPATIENT)
Facility: HOSPITAL | Age: 65
End: 2025-08-07
Payer: MEDICARE

## 2025-08-07 VITALS
TEMPERATURE: 99.1 F | WEIGHT: 228.62 LBS | BODY MASS INDEX: 33.76 KG/M2 | DIASTOLIC BLOOD PRESSURE: 81 MMHG | HEART RATE: 71 BPM | OXYGEN SATURATION: 97 % | RESPIRATION RATE: 16 BRPM | SYSTOLIC BLOOD PRESSURE: 173 MMHG

## 2025-08-07 DIAGNOSIS — I16.0 HYPERTENSIVE URGENCY: Primary | ICD-10-CM

## 2025-08-07 LAB
ALBUMIN SERPL-MCNC: 4 G/DL (ref 3.5–5)
ALBUMIN/GLOB SERPL: 1.1 (ref 1.1–2.2)
ALP SERPL-CCNC: 91 U/L (ref 45–117)
ALT SERPL-CCNC: 47 U/L (ref 12–78)
ANION GAP SERPL CALC-SCNC: 11 MMOL/L (ref 2–12)
AST SERPL-CCNC: 26 U/L (ref 15–37)
BASOPHILS # BLD: 0.05 K/UL (ref 0–0.1)
BASOPHILS NFR BLD: 0.6 % (ref 0–1)
BILIRUB SERPL-MCNC: 0.3 MG/DL (ref 0.2–1)
BUN SERPL-MCNC: 11 MG/DL (ref 6–20)
BUN/CREAT SERPL: 12 (ref 12–20)
CALCIUM SERPL-MCNC: 8.9 MG/DL (ref 8.5–10.1)
CHLORIDE SERPL-SCNC: 101 MMOL/L (ref 97–108)
CO2 SERPL-SCNC: 28 MMOL/L (ref 21–32)
CREAT SERPL-MCNC: 0.92 MG/DL (ref 0.55–1.02)
DIFFERENTIAL METHOD BLD: NORMAL
EOSINOPHIL # BLD: 0.2 K/UL (ref 0–0.4)
EOSINOPHIL NFR BLD: 2.5 % (ref 0–7)
ERYTHROCYTE [DISTWIDTH] IN BLOOD BY AUTOMATED COUNT: 12.9 % (ref 11.5–14.5)
GLOBULIN SER CALC-MCNC: 3.5 G/DL (ref 2–4)
GLUCOSE SERPL-MCNC: 151 MG/DL (ref 65–100)
HCT VFR BLD AUTO: 40.7 % (ref 35–47)
HGB BLD-MCNC: 13.8 G/DL (ref 11.5–16)
IMM GRANULOCYTES # BLD AUTO: 0.03 K/UL (ref 0–0.04)
IMM GRANULOCYTES NFR BLD AUTO: 0.4 % (ref 0–0.5)
LYMPHOCYTES # BLD: 3.48 K/UL (ref 0.8–3.5)
LYMPHOCYTES NFR BLD: 43.1 % (ref 12–49)
MCH RBC QN AUTO: 29.6 PG (ref 26–34)
MCHC RBC AUTO-ENTMCNC: 33.9 G/DL (ref 30–36.5)
MCV RBC AUTO: 87.3 FL (ref 80–99)
MONOCYTES # BLD: 0.55 K/UL (ref 0–1)
MONOCYTES NFR BLD: 6.8 % (ref 5–13)
NEUTS SEG # BLD: 3.77 K/UL (ref 1.8–8)
NEUTS SEG NFR BLD: 46.6 % (ref 32–75)
NRBC # BLD: 0 K/UL (ref 0–0.01)
NRBC BLD-RTO: 0 PER 100 WBC
PLATELET # BLD AUTO: 346 K/UL (ref 150–400)
PMV BLD AUTO: 9.6 FL (ref 8.9–12.9)
POTASSIUM SERPL-SCNC: 3.9 MMOL/L (ref 3.5–5.1)
PROT SERPL-MCNC: 7.5 G/DL (ref 6.4–8.2)
RBC # BLD AUTO: 4.66 M/UL (ref 3.8–5.2)
SODIUM SERPL-SCNC: 140 MMOL/L (ref 136–145)
TROPONIN I SERPL HS-MCNC: 5 NG/L (ref 0–51)
WBC # BLD AUTO: 8.1 K/UL (ref 3.6–11)

## 2025-08-07 PROCEDURE — 85025 COMPLETE CBC W/AUTO DIFF WBC: CPT

## 2025-08-07 PROCEDURE — 36415 COLL VENOUS BLD VENIPUNCTURE: CPT

## 2025-08-07 PROCEDURE — 70450 CT HEAD/BRAIN W/O DYE: CPT

## 2025-08-07 PROCEDURE — 71046 X-RAY EXAM CHEST 2 VIEWS: CPT

## 2025-08-07 PROCEDURE — 84484 ASSAY OF TROPONIN QUANT: CPT

## 2025-08-07 PROCEDURE — 99285 EMERGENCY DEPT VISIT HI MDM: CPT

## 2025-08-07 PROCEDURE — 93005 ELECTROCARDIOGRAM TRACING: CPT | Performed by: EMERGENCY MEDICINE

## 2025-08-07 PROCEDURE — 80053 COMPREHEN METABOLIC PANEL: CPT

## 2025-08-07 ASSESSMENT — ENCOUNTER SYMPTOMS
NAUSEA: 0
CONSTIPATION: 0
VOMITING: 0
BACK PAIN: 0
DIARRHEA: 0
SHORTNESS OF BREATH: 0
COLOR CHANGE: 0
ABDOMINAL PAIN: 0

## 2025-08-08 LAB
EKG ATRIAL RATE: 91 BPM
EKG DIAGNOSIS: NORMAL
EKG P AXIS: 54 DEGREES
EKG P-R INTERVAL: 140 MS
EKG Q-T INTERVAL: 362 MS
EKG QRS DURATION: 74 MS
EKG QTC CALCULATION (BAZETT): 445 MS
EKG R AXIS: 21 DEGREES
EKG T AXIS: 36 DEGREES
EKG VENTRICULAR RATE: 91 BPM

## 2025-08-08 PROCEDURE — 93010 ELECTROCARDIOGRAM REPORT: CPT | Performed by: SPECIALIST

## 2025-08-11 ENCOUNTER — OFFICE VISIT (OUTPATIENT)
Age: 65
End: 2025-08-11
Payer: MEDICARE

## 2025-08-11 VITALS
DIASTOLIC BLOOD PRESSURE: 84 MMHG | RESPIRATION RATE: 16 BRPM | SYSTOLIC BLOOD PRESSURE: 142 MMHG | TEMPERATURE: 98 F | BODY MASS INDEX: 33.59 KG/M2 | HEART RATE: 80 BPM | WEIGHT: 226.8 LBS | HEIGHT: 69 IN | OXYGEN SATURATION: 95 %

## 2025-08-11 DIAGNOSIS — E78.2 MIXED HYPERLIPIDEMIA: ICD-10-CM

## 2025-08-11 DIAGNOSIS — R00.2 HEART PALPITATIONS: ICD-10-CM

## 2025-08-11 DIAGNOSIS — R03.0 BLOOD PRESSURE ELEVATED WITHOUT HISTORY OF HTN: Primary | ICD-10-CM

## 2025-08-11 DIAGNOSIS — R73.03 PREDIABETES: ICD-10-CM

## 2025-08-11 PROCEDURE — G8417 CALC BMI ABV UP PARAM F/U: HCPCS | Performed by: FAMILY MEDICINE

## 2025-08-11 PROCEDURE — 1036F TOBACCO NON-USER: CPT | Performed by: FAMILY MEDICINE

## 2025-08-11 PROCEDURE — 99215 OFFICE O/P EST HI 40 MIN: CPT | Performed by: FAMILY MEDICINE

## 2025-08-11 PROCEDURE — 1126F AMNT PAIN NOTED NONE PRSNT: CPT | Performed by: FAMILY MEDICINE

## 2025-08-11 PROCEDURE — G8400 PT W/DXA NO RESULTS DOC: HCPCS | Performed by: FAMILY MEDICINE

## 2025-08-11 PROCEDURE — 1123F ACP DISCUSS/DSCN MKR DOCD: CPT | Performed by: FAMILY MEDICINE

## 2025-08-11 PROCEDURE — 1159F MED LIST DOCD IN RCRD: CPT | Performed by: FAMILY MEDICINE

## 2025-08-11 PROCEDURE — 1160F RVW MEDS BY RX/DR IN RCRD: CPT | Performed by: FAMILY MEDICINE

## 2025-08-11 PROCEDURE — 1090F PRES/ABSN URINE INCON ASSESS: CPT | Performed by: FAMILY MEDICINE

## 2025-08-11 PROCEDURE — 3017F COLORECTAL CA SCREEN DOC REV: CPT | Performed by: FAMILY MEDICINE

## 2025-08-11 PROCEDURE — G8427 DOCREV CUR MEDS BY ELIG CLIN: HCPCS | Performed by: FAMILY MEDICINE

## 2025-08-11 ASSESSMENT — ENCOUNTER SYMPTOMS
RESPIRATORY NEGATIVE: 1
EYES NEGATIVE: 1

## 2025-08-12 LAB
APPEARANCE UR: ABNORMAL
BACTERIA URNS QL MICRO: NEGATIVE /HPF
BILIRUB UR QL: NEGATIVE
COLOR UR: ABNORMAL
EPITH CASTS URNS QL MICRO: ABNORMAL /LPF
GLUCOSE UR STRIP.AUTO-MCNC: NEGATIVE MG/DL
HGB UR QL STRIP: NEGATIVE
HYALINE CASTS URNS QL MICRO: ABNORMAL /LPF (ref 0–5)
KETONES UR QL STRIP.AUTO: ABNORMAL MG/DL
LEUKOCYTE ESTERASE UR QL STRIP.AUTO: NEGATIVE
NITRITE UR QL STRIP.AUTO: NEGATIVE
PH UR STRIP: 5 (ref 5–8)
PROT UR STRIP-MCNC: NEGATIVE MG/DL
RBC #/AREA URNS HPF: ABNORMAL /HPF (ref 0–5)
SP GR UR REFRACTOMETRY: 1.02 (ref 1–1.03)
TSH, 3RD GENERATION: 2.16 UIU/ML (ref 0.27–4.2)
UROBILINOGEN UR QL STRIP.AUTO: 0.2 EU/DL (ref 0.2–1)
WBC URNS QL MICRO: ABNORMAL /HPF (ref 0–4)